# Patient Record
Sex: MALE | Race: WHITE | Employment: OTHER | URBAN - METROPOLITAN AREA
[De-identification: names, ages, dates, MRNs, and addresses within clinical notes are randomized per-mention and may not be internally consistent; named-entity substitution may affect disease eponyms.]

---

## 2017-01-02 LAB — INR BLD: 2.4

## 2017-01-03 ENCOUNTER — ANTI-COAG VISIT (OUTPATIENT)
Dept: CARDIOLOGY CLINIC | Age: 72
End: 2017-01-03

## 2017-01-05 ENCOUNTER — OFFICE VISIT (OUTPATIENT)
Dept: SURGERY | Age: 72
End: 2017-01-05

## 2017-01-05 VITALS — WEIGHT: 205 LBS | DIASTOLIC BLOOD PRESSURE: 72 MMHG | SYSTOLIC BLOOD PRESSURE: 100 MMHG | BODY MASS INDEX: 31.63 KG/M2

## 2017-01-05 DIAGNOSIS — L72.9 CYST OF BUTTOCKS: ICD-10-CM

## 2017-01-05 DIAGNOSIS — K61.2 ABSCESS OF ANAL AND RECTAL REGIONS: Primary | ICD-10-CM

## 2017-01-05 PROCEDURE — 99213 OFFICE O/P EST LOW 20 MIN: CPT | Performed by: SURGERY

## 2017-01-05 RX ORDER — LEVOFLOXACIN 500 MG/1
500 TABLET, FILM COATED ORAL DAILY
Qty: 10 TABLET | Refills: 0 | Status: SHIPPED | OUTPATIENT
Start: 2017-01-05 | End: 2017-01-15

## 2017-01-09 ENCOUNTER — ANTI-COAG VISIT (OUTPATIENT)
Dept: CARDIOLOGY CLINIC | Age: 72
End: 2017-01-09

## 2017-01-09 LAB — INR BLD: 2.1

## 2017-01-16 ENCOUNTER — ANTI-COAG VISIT (OUTPATIENT)
Dept: CARDIOLOGY CLINIC | Age: 72
End: 2017-01-16

## 2017-01-16 LAB — INR BLD: 1.9

## 2017-01-23 ENCOUNTER — ANTI-COAG VISIT (OUTPATIENT)
Dept: CARDIOLOGY CLINIC | Age: 72
End: 2017-01-23

## 2017-01-23 LAB — INR BLD: 2.1

## 2017-01-30 ENCOUNTER — HOSPITAL ENCOUNTER (OUTPATIENT)
Dept: OTHER | Age: 72
Discharge: HOME | End: 2017-01-30
Attending: NURSE PRACTITIONER | Admitting: NURSE PRACTITIONER

## 2017-01-30 ENCOUNTER — OFFICE VISIT (OUTPATIENT)
Dept: CARDIOLOGY CLINIC | Age: 72
End: 2017-01-30

## 2017-01-30 ENCOUNTER — TELEPHONE (OUTPATIENT)
Dept: CARDIOLOGY CLINIC | Age: 72
End: 2017-01-30

## 2017-01-30 VITALS
HEART RATE: 68 BPM | SYSTOLIC BLOOD PRESSURE: 122 MMHG | BODY MASS INDEX: 30.07 KG/M2 | HEIGHT: 69 IN | DIASTOLIC BLOOD PRESSURE: 74 MMHG | WEIGHT: 203 LBS

## 2017-01-30 DIAGNOSIS — I48.0 PAROXYSMAL ATRIAL FIBRILLATION (HCC): Primary | ICD-10-CM

## 2017-01-30 DIAGNOSIS — I45.10 RBBB (RIGHT BUNDLE BRANCH BLOCK): ICD-10-CM

## 2017-01-30 DIAGNOSIS — I10 ESSENTIAL HYPERTENSION: ICD-10-CM

## 2017-01-30 LAB
INR BLD: 3.03 (ref 0.85–1.16)
PROTHROMBIN TIME: 35.3 SEC (ref 9.8–13)

## 2017-01-30 PROCEDURE — G8484 FLU IMMUNIZE NO ADMIN: HCPCS | Performed by: NURSE PRACTITIONER

## 2017-01-30 PROCEDURE — 4040F PNEUMOC VAC/ADMIN/RCVD: CPT | Performed by: NURSE PRACTITIONER

## 2017-01-30 PROCEDURE — 93000 ELECTROCARDIOGRAM COMPLETE: CPT | Performed by: NURSE PRACTITIONER

## 2017-01-30 PROCEDURE — 3017F COLORECTAL CA SCREEN DOC REV: CPT | Performed by: NURSE PRACTITIONER

## 2017-01-30 PROCEDURE — 1123F ACP DISCUSS/DSCN MKR DOCD: CPT | Performed by: NURSE PRACTITIONER

## 2017-01-30 PROCEDURE — G8427 DOCREV CUR MEDS BY ELIG CLIN: HCPCS | Performed by: NURSE PRACTITIONER

## 2017-01-30 PROCEDURE — 1036F TOBACCO NON-USER: CPT | Performed by: NURSE PRACTITIONER

## 2017-01-30 PROCEDURE — 99213 OFFICE O/P EST LOW 20 MIN: CPT | Performed by: NURSE PRACTITIONER

## 2017-01-30 PROCEDURE — G8420 CALC BMI NORM PARAMETERS: HCPCS | Performed by: NURSE PRACTITIONER

## 2017-01-31 ENCOUNTER — TELEPHONE (OUTPATIENT)
Dept: CARDIOLOGY CLINIC | Age: 72
End: 2017-01-31

## 2017-02-07 ENCOUNTER — ANTI-COAG VISIT (OUTPATIENT)
Dept: CARDIOLOGY CLINIC | Age: 72
End: 2017-02-07

## 2017-02-07 DIAGNOSIS — I48.0 PAROXYSMAL ATRIAL FIBRILLATION (HCC): ICD-10-CM

## 2017-02-07 LAB — INR BLD: 2.7

## 2017-02-09 ENCOUNTER — OFFICE VISIT (OUTPATIENT)
Dept: SURGERY | Age: 72
End: 2017-02-09

## 2017-02-09 ENCOUNTER — TELEPHONE (OUTPATIENT)
Dept: BARIATRICS/WEIGHT MGMT | Age: 72
End: 2017-02-09

## 2017-02-09 VITALS — WEIGHT: 201 LBS | SYSTOLIC BLOOD PRESSURE: 120 MMHG | BODY MASS INDEX: 29.68 KG/M2 | DIASTOLIC BLOOD PRESSURE: 60 MMHG

## 2017-02-09 DIAGNOSIS — L72.9 CYST OF BUTTOCKS: Primary | ICD-10-CM

## 2017-02-09 PROCEDURE — 4040F PNEUMOC VAC/ADMIN/RCVD: CPT | Performed by: SURGERY

## 2017-02-09 PROCEDURE — 3017F COLORECTAL CA SCREEN DOC REV: CPT | Performed by: SURGERY

## 2017-02-09 PROCEDURE — 1123F ACP DISCUSS/DSCN MKR DOCD: CPT | Performed by: SURGERY

## 2017-02-09 PROCEDURE — G8420 CALC BMI NORM PARAMETERS: HCPCS | Performed by: SURGERY

## 2017-02-09 PROCEDURE — G8484 FLU IMMUNIZE NO ADMIN: HCPCS | Performed by: SURGERY

## 2017-02-09 PROCEDURE — G8427 DOCREV CUR MEDS BY ELIG CLIN: HCPCS | Performed by: SURGERY

## 2017-02-09 PROCEDURE — 1036F TOBACCO NON-USER: CPT | Performed by: SURGERY

## 2017-02-09 PROCEDURE — 99213 OFFICE O/P EST LOW 20 MIN: CPT | Performed by: SURGERY

## 2017-02-09 RX ORDER — CIPROFLOXACIN 500 MG/1
500 TABLET, FILM COATED ORAL 2 TIMES DAILY
Qty: 10 TABLET | Refills: 0 | Status: SHIPPED | OUTPATIENT
Start: 2017-02-09 | End: 2017-02-14

## 2017-02-13 ENCOUNTER — ANTI-COAG VISIT (OUTPATIENT)
Dept: CARDIOLOGY CLINIC | Age: 72
End: 2017-02-13

## 2017-02-13 ENCOUNTER — HOSPITAL ENCOUNTER (OUTPATIENT)
Dept: NON INVASIVE DIAGNOSTICS | Age: 72
Discharge: HOME | End: 2017-02-13
Attending: NURSE PRACTITIONER | Admitting: NURSE PRACTITIONER

## 2017-02-13 DIAGNOSIS — I48.0 PAROXYSMAL ATRIAL FIBRILLATION (HCC): ICD-10-CM

## 2017-02-13 LAB
INR BLD: 1.7
LV EF: 73 %
LVEF MODALITY: NORMAL

## 2017-02-15 ENCOUNTER — TELEPHONE (OUTPATIENT)
Dept: BARIATRICS/WEIGHT MGMT | Age: 72
End: 2017-02-15

## 2017-02-27 ENCOUNTER — ANTI-COAG VISIT (OUTPATIENT)
Dept: CARDIOLOGY CLINIC | Age: 72
End: 2017-02-27

## 2017-02-27 LAB — INR BLD: 2

## 2017-03-06 ENCOUNTER — ANTI-COAG VISIT (OUTPATIENT)
Dept: CARDIOLOGY CLINIC | Age: 72
End: 2017-03-06

## 2017-03-06 DIAGNOSIS — I48.0 PAROXYSMAL ATRIAL FIBRILLATION (HCC): ICD-10-CM

## 2017-03-06 LAB — INR BLD: 2.1

## 2017-03-13 ENCOUNTER — ANTI-COAG VISIT (OUTPATIENT)
Dept: CARDIOLOGY CLINIC | Age: 72
End: 2017-03-13

## 2017-03-13 LAB — INR BLD: 2.9

## 2017-03-20 ENCOUNTER — ANTI-COAG VISIT (OUTPATIENT)
Dept: CARDIOLOGY CLINIC | Age: 72
End: 2017-03-20

## 2017-03-20 LAB — INR BLD: 2.7

## 2017-03-27 ENCOUNTER — ANTI-COAG VISIT (OUTPATIENT)
Dept: CARDIOLOGY CLINIC | Age: 72
End: 2017-03-27

## 2017-03-27 LAB — INR BLD: 2.5

## 2017-04-03 ENCOUNTER — ANTI-COAG VISIT (OUTPATIENT)
Dept: CARDIOLOGY CLINIC | Age: 72
End: 2017-04-03

## 2017-04-03 LAB — INR BLD: 2

## 2017-04-06 ENCOUNTER — OFFICE VISIT (OUTPATIENT)
Dept: BARIATRICS/WEIGHT MGMT | Age: 72
End: 2017-04-06

## 2017-04-06 VITALS
BODY MASS INDEX: 31.22 KG/M2 | HEART RATE: 61 BPM | DIASTOLIC BLOOD PRESSURE: 81 MMHG | HEIGHT: 68 IN | RESPIRATION RATE: 16 BRPM | WEIGHT: 206 LBS | SYSTOLIC BLOOD PRESSURE: 125 MMHG

## 2017-04-06 DIAGNOSIS — Z98.84 LAP-BAND SURGERY STATUS: ICD-10-CM

## 2017-04-06 DIAGNOSIS — Z98.84 S/P GASTRIC BYPASS: ICD-10-CM

## 2017-04-06 DIAGNOSIS — E66.01 SEVERE OBESITY (BMI 35.0-39.9) WITH COMORBIDITY (HCC): Primary | ICD-10-CM

## 2017-04-06 PROCEDURE — 99212 OFFICE O/P EST SF 10 MIN: CPT | Performed by: SURGERY

## 2017-04-06 PROCEDURE — 4040F PNEUMOC VAC/ADMIN/RCVD: CPT | Performed by: SURGERY

## 2017-04-06 PROCEDURE — 3017F COLORECTAL CA SCREEN DOC REV: CPT | Performed by: SURGERY

## 2017-04-06 PROCEDURE — 1123F ACP DISCUSS/DSCN MKR DOCD: CPT | Performed by: SURGERY

## 2017-04-06 PROCEDURE — 1036F TOBACCO NON-USER: CPT | Performed by: SURGERY

## 2017-04-06 PROCEDURE — G8417 CALC BMI ABV UP PARAM F/U: HCPCS | Performed by: SURGERY

## 2017-04-06 PROCEDURE — G8427 DOCREV CUR MEDS BY ELIG CLIN: HCPCS | Performed by: SURGERY

## 2017-04-06 RX ORDER — DIVALPROEX SODIUM 500 MG/1
500 TABLET, DELAYED RELEASE ORAL 3 TIMES DAILY
COMMUNITY
End: 2018-11-01

## 2017-04-10 ENCOUNTER — ANTI-COAG VISIT (OUTPATIENT)
Dept: CARDIOLOGY CLINIC | Age: 72
End: 2017-04-10

## 2017-04-10 LAB — INR BLD: 1.8

## 2017-04-12 RX ORDER — CIPROFLOXACIN 500 MG/1
500 TABLET, FILM COATED ORAL 2 TIMES DAILY
Qty: 20 TABLET | Refills: 1 | Status: SHIPPED | OUTPATIENT
Start: 2017-04-12 | End: 2017-04-12 | Stop reason: SDUPTHER

## 2017-04-12 RX ORDER — CIPROFLOXACIN 500 MG/1
500 TABLET, FILM COATED ORAL 2 TIMES DAILY
Qty: 20 TABLET | Refills: 0 | Status: SHIPPED | OUTPATIENT
Start: 2017-04-12 | End: 2017-04-22

## 2017-04-17 LAB — INR BLD: 2.8

## 2017-04-18 ENCOUNTER — ANTI-COAG VISIT (OUTPATIENT)
Dept: CARDIOLOGY CLINIC | Age: 72
End: 2017-04-18

## 2017-04-24 LAB — INR BLD: 2.1

## 2017-04-27 ENCOUNTER — ANTI-COAG VISIT (OUTPATIENT)
Dept: CARDIOLOGY CLINIC | Age: 72
End: 2017-04-27

## 2017-05-01 ENCOUNTER — ANTI-COAG VISIT (OUTPATIENT)
Dept: CARDIOLOGY CLINIC | Age: 72
End: 2017-05-01

## 2017-05-01 LAB — INR BLD: 2.2

## 2017-05-04 ENCOUNTER — OFFICE VISIT (OUTPATIENT)
Dept: CARDIOLOGY CLINIC | Age: 72
End: 2017-05-04

## 2017-05-04 VITALS
WEIGHT: 214.5 LBS | DIASTOLIC BLOOD PRESSURE: 62 MMHG | HEART RATE: 62 BPM | SYSTOLIC BLOOD PRESSURE: 100 MMHG | BODY MASS INDEX: 33.1 KG/M2

## 2017-05-04 DIAGNOSIS — I48.0 PAROXYSMAL ATRIAL FIBRILLATION (HCC): Primary | ICD-10-CM

## 2017-05-04 DIAGNOSIS — I10 ESSENTIAL HYPERTENSION: ICD-10-CM

## 2017-05-04 DIAGNOSIS — G47.33 OBSTRUCTIVE SLEEP APNEA: ICD-10-CM

## 2017-05-04 PROCEDURE — 3017F COLORECTAL CA SCREEN DOC REV: CPT | Performed by: NURSE PRACTITIONER

## 2017-05-04 PROCEDURE — 1123F ACP DISCUSS/DSCN MKR DOCD: CPT | Performed by: NURSE PRACTITIONER

## 2017-05-04 PROCEDURE — 93000 ELECTROCARDIOGRAM COMPLETE: CPT | Performed by: NURSE PRACTITIONER

## 2017-05-04 PROCEDURE — 99213 OFFICE O/P EST LOW 20 MIN: CPT | Performed by: NURSE PRACTITIONER

## 2017-05-04 PROCEDURE — G8427 DOCREV CUR MEDS BY ELIG CLIN: HCPCS | Performed by: NURSE PRACTITIONER

## 2017-05-04 PROCEDURE — 1036F TOBACCO NON-USER: CPT | Performed by: NURSE PRACTITIONER

## 2017-05-04 PROCEDURE — G8417 CALC BMI ABV UP PARAM F/U: HCPCS | Performed by: NURSE PRACTITIONER

## 2017-05-04 PROCEDURE — 4040F PNEUMOC VAC/ADMIN/RCVD: CPT | Performed by: NURSE PRACTITIONER

## 2017-05-08 ENCOUNTER — ANTI-COAG VISIT (OUTPATIENT)
Dept: CARDIOLOGY CLINIC | Age: 72
End: 2017-05-08

## 2017-05-08 LAB — INR BLD: 1.8

## 2017-05-15 ENCOUNTER — ANTI-COAG VISIT (OUTPATIENT)
Dept: CARDIOLOGY CLINIC | Age: 72
End: 2017-05-15

## 2017-05-15 LAB — INR BLD: 2

## 2017-05-17 ENCOUNTER — TELEPHONE (OUTPATIENT)
Dept: DERMATOLOGY | Age: 72
End: 2017-05-17

## 2017-05-18 ENCOUNTER — OFFICE VISIT (OUTPATIENT)
Dept: DERMATOLOGY | Age: 72
End: 2017-05-18

## 2017-05-18 DIAGNOSIS — L82.1 SK (SEBORRHEIC KERATOSIS): Primary | ICD-10-CM

## 2017-05-18 DIAGNOSIS — L57.0 AK (ACTINIC KERATOSIS): ICD-10-CM

## 2017-05-18 PROCEDURE — 1036F TOBACCO NON-USER: CPT | Performed by: DERMATOLOGY

## 2017-05-18 PROCEDURE — 99213 OFFICE O/P EST LOW 20 MIN: CPT | Performed by: DERMATOLOGY

## 2017-05-18 PROCEDURE — G8427 DOCREV CUR MEDS BY ELIG CLIN: HCPCS | Performed by: DERMATOLOGY

## 2017-05-18 PROCEDURE — 1123F ACP DISCUSS/DSCN MKR DOCD: CPT | Performed by: DERMATOLOGY

## 2017-05-18 PROCEDURE — 3017F COLORECTAL CA SCREEN DOC REV: CPT | Performed by: DERMATOLOGY

## 2017-05-18 PROCEDURE — 4040F PNEUMOC VAC/ADMIN/RCVD: CPT | Performed by: DERMATOLOGY

## 2017-05-18 PROCEDURE — G8417 CALC BMI ABV UP PARAM F/U: HCPCS | Performed by: DERMATOLOGY

## 2017-05-18 RX ORDER — LANOLIN ALCOHOL/MO/W.PET/CERES
1000 CREAM (GRAM) TOPICAL DAILY
COMMUNITY
End: 2020-09-23 | Stop reason: ALTCHOICE

## 2017-05-22 ENCOUNTER — ANTI-COAG VISIT (OUTPATIENT)
Dept: CARDIOLOGY CLINIC | Age: 72
End: 2017-05-22

## 2017-05-22 LAB — INR BLD: 2.4

## 2017-05-29 LAB — INR BLD: 2.9

## 2017-05-30 ENCOUNTER — ANTI-COAG VISIT (OUTPATIENT)
Dept: CARDIOLOGY CLINIC | Age: 72
End: 2017-05-30

## 2017-06-05 ENCOUNTER — ANTI-COAG VISIT (OUTPATIENT)
Dept: CARDIOLOGY CLINIC | Age: 72
End: 2017-06-05

## 2017-06-05 LAB — INR BLD: 2.2

## 2017-06-09 RX ORDER — WARFARIN SODIUM 5 MG/1
TABLET ORAL
Qty: 135 TABLET | Refills: 1 | Status: SHIPPED | OUTPATIENT
Start: 2017-06-09 | End: 2020-02-03 | Stop reason: ALTCHOICE

## 2017-06-12 ENCOUNTER — ANTI-COAG VISIT (OUTPATIENT)
Dept: CARDIOLOGY CLINIC | Age: 72
End: 2017-06-12

## 2017-06-12 LAB — INR BLD: 2.6

## 2017-06-15 ENCOUNTER — TELEPHONE (OUTPATIENT)
Dept: CARDIOLOGY CLINIC | Age: 72
End: 2017-06-15

## 2017-06-19 ENCOUNTER — ANTI-COAG VISIT (OUTPATIENT)
Dept: CARDIOLOGY CLINIC | Age: 72
End: 2017-06-19

## 2017-06-19 LAB — INR BLD: 2.4

## 2017-06-26 ENCOUNTER — ANTI-COAG VISIT (OUTPATIENT)
Dept: CARDIOLOGY CLINIC | Age: 72
End: 2017-06-26

## 2017-06-26 LAB — INR BLD: 4.1

## 2017-07-03 ENCOUNTER — ANTI-COAG VISIT (OUTPATIENT)
Dept: CARDIOLOGY CLINIC | Age: 72
End: 2017-07-03

## 2017-07-03 LAB — INR BLD: 2.4

## 2017-07-10 ENCOUNTER — ANTI-COAG VISIT (OUTPATIENT)
Dept: CARDIOLOGY CLINIC | Age: 72
End: 2017-07-10

## 2017-07-10 LAB — INR BLD: 2

## 2017-07-17 ENCOUNTER — ANTI-COAG VISIT (OUTPATIENT)
Dept: CARDIOLOGY CLINIC | Age: 72
End: 2017-07-17

## 2017-07-17 LAB — INR BLD: 3.9

## 2017-07-20 ENCOUNTER — TELEPHONE (OUTPATIENT)
Dept: PHARMACY | Age: 72
End: 2017-07-20

## 2017-07-21 ENCOUNTER — TELEPHONE (OUTPATIENT)
Dept: PHARMACY | Age: 72
End: 2017-07-21

## 2017-07-24 ENCOUNTER — TELEPHONE (OUTPATIENT)
Dept: CARDIOLOGY CLINIC | Age: 72
End: 2017-07-24

## 2017-07-24 ENCOUNTER — ANTI-COAG VISIT (OUTPATIENT)
Dept: CARDIOLOGY CLINIC | Age: 72
End: 2017-07-24

## 2017-07-24 DIAGNOSIS — I48.0 PAROXYSMAL ATRIAL FIBRILLATION (HCC): ICD-10-CM

## 2017-07-24 LAB
INR BLD: 1.4
INR BLD: 1.4

## 2017-07-28 ENCOUNTER — ANTI-COAG VISIT (OUTPATIENT)
Dept: CARDIOLOGY CLINIC | Age: 72
End: 2017-07-28

## 2017-07-28 LAB — INR BLD: 2

## 2017-08-10 ENCOUNTER — OFFICE VISIT (OUTPATIENT)
Dept: CARDIOLOGY CLINIC | Age: 72
End: 2017-08-10

## 2017-08-10 ENCOUNTER — ANTI-COAG VISIT (OUTPATIENT)
Dept: PHARMACY | Age: 72
End: 2017-08-10

## 2017-08-10 VITALS
DIASTOLIC BLOOD PRESSURE: 62 MMHG | BODY MASS INDEX: 32.75 KG/M2 | SYSTOLIC BLOOD PRESSURE: 102 MMHG | WEIGHT: 212.25 LBS | HEART RATE: 70 BPM

## 2017-08-10 DIAGNOSIS — I48.0 PAROXYSMAL ATRIAL FIBRILLATION (HCC): Primary | ICD-10-CM

## 2017-08-10 DIAGNOSIS — I10 ESSENTIAL HYPERTENSION: ICD-10-CM

## 2017-08-10 DIAGNOSIS — I48.0 PAROXYSMAL ATRIAL FIBRILLATION (HCC): ICD-10-CM

## 2017-08-10 DIAGNOSIS — G47.33 OBSTRUCTIVE SLEEP APNEA: ICD-10-CM

## 2017-08-10 DIAGNOSIS — I45.10 RBBB (RIGHT BUNDLE BRANCH BLOCK): ICD-10-CM

## 2017-08-10 LAB
INR BLD: 3
PROTIME: 36.3 SECONDS

## 2017-08-10 PROCEDURE — 99213 OFFICE O/P EST LOW 20 MIN: CPT | Performed by: NURSE PRACTITIONER

## 2017-08-10 PROCEDURE — 4040F PNEUMOC VAC/ADMIN/RCVD: CPT | Performed by: NURSE PRACTITIONER

## 2017-08-10 PROCEDURE — 3017F COLORECTAL CA SCREEN DOC REV: CPT | Performed by: NURSE PRACTITIONER

## 2017-08-10 PROCEDURE — 93000 ELECTROCARDIOGRAM COMPLETE: CPT | Performed by: NURSE PRACTITIONER

## 2017-08-10 PROCEDURE — 1123F ACP DISCUSS/DSCN MKR DOCD: CPT | Performed by: NURSE PRACTITIONER

## 2017-08-10 PROCEDURE — 1036F TOBACCO NON-USER: CPT | Performed by: NURSE PRACTITIONER

## 2017-08-10 PROCEDURE — G8417 CALC BMI ABV UP PARAM F/U: HCPCS | Performed by: NURSE PRACTITIONER

## 2017-08-10 PROCEDURE — G8427 DOCREV CUR MEDS BY ELIG CLIN: HCPCS | Performed by: NURSE PRACTITIONER

## 2017-08-10 RX ORDER — PRIMIDONE 50 MG/1
50 TABLET ORAL NIGHTLY
COMMUNITY
End: 2018-07-20 | Stop reason: DRUGHIGH

## 2017-08-23 ENCOUNTER — ANTI-COAG VISIT (OUTPATIENT)
Dept: PHARMACY | Age: 72
End: 2017-08-23

## 2017-08-23 DIAGNOSIS — I48.0 PAROXYSMAL ATRIAL FIBRILLATION (HCC): ICD-10-CM

## 2017-08-23 LAB
INR BLD: 2.8
PROTIME: 33.8 SECONDS

## 2017-09-05 ENCOUNTER — TELEPHONE (OUTPATIENT)
Dept: PHARMACY | Age: 72
End: 2017-09-05

## 2017-09-13 ENCOUNTER — ANTI-COAG VISIT (OUTPATIENT)
Dept: PHARMACY | Age: 72
End: 2017-09-13

## 2017-09-13 LAB
INR BLD: 1.4
PROTIME: 16.5 SECONDS

## 2017-09-15 ENCOUNTER — TELEPHONE (OUTPATIENT)
Dept: PHARMACY | Age: 72
End: 2017-09-15

## 2017-09-27 ENCOUNTER — ANTI-COAG VISIT (OUTPATIENT)
Dept: PHARMACY | Age: 72
End: 2017-09-27

## 2017-09-27 LAB
INR BLD: 1.7
PROTIME: 20.5 SECONDS

## 2017-10-18 ENCOUNTER — ANTI-COAG VISIT (OUTPATIENT)
Dept: PHARMACY | Age: 72
End: 2017-10-18

## 2017-10-18 DIAGNOSIS — I48.91 ATRIAL FIBRILLATION, UNSPECIFIED TYPE (HCC): ICD-10-CM

## 2017-10-18 LAB
INR BLD: 1.7
PROTIME: 20.1 SECONDS

## 2017-10-18 NOTE — PROGRESS NOTES
Mr. Elizabeth Olvera is a 67 y.o. y/o male with history of Afib   He presents today for anticoagulation monitoring and adjustment. Pertinent PMH: COPD, CHF, HTN  Patient was previously a patient here and then monitored through his cardiologist. Returned back to the clinic on 8/10/17. Patient Reported Findings:  Yes     No  [x]   []       Patient verifies current dosing regimen as listed  []   [x]       S/S bleeding/bruising/swelling/SOB  []   [x]       Blood in urine or stool  []   [x]       Procedures scheduled in the future at this time  []   [x]       Missed Dose  []   [x]       Extra Dose  [x]   []       Change in medications -finishing cephalexin as of today  [x]   []       Change in health/diet/appetite Patient will have his lap band tightened tomorrow which will continue to affect his diet. Patient still conscious of what he is eating because he is supposed to be dieting. Patient doesn't eat many vegetables. States he \"won't touch broccoli\". He has stated in the past that he wants to start a vegan diet in the near future (he wants to do research first) and will let us know when that starts so we can adjust the dose.    []   [x]       Change in alcohol use  []   [x]       Change in activity  []   [x]       Hospital admission  []   [x]       Emergency department visit  []   [x]       Other complaints  []   [x]       Tobacco use    Clinical Outcomes:  Yes     No  []   [x]       Major bleeding event  []   [x]       Thromboembolic event    Duration of warfarin Therapy: indefinitely  INR Range:  2.0-3.0  INR 1.7 again today   Increase weekly dose to 7.5 mg on Tue, Thu and Sat and 5 mg all other days. (6.3% increase)  Recheck INR 3 weeks.      Referring cardiologist: Dr. Ezequiel Rasheed  INR (no units)   Date Value   10/18/2017 1.7   09/27/2017 1.7   09/13/2017 1.4   08/23/2017 2.8

## 2017-10-25 ENCOUNTER — TELEPHONE (OUTPATIENT)
Dept: PHARMACY | Age: 72
End: 2017-10-25

## 2017-10-25 NOTE — TELEPHONE ENCOUNTER
----- Message from Sadia Galaviz sent at 10/25/2017  3:32 PM EDT -----  Contact: Don Rizzo   Pt called to report he was prescribed Bromfed DM 2-30-10 mg/5 ml syrup , take 2 tsp every 6 hrs, PRN ( for cough) and Keflex 500 mg cap, 2 caps ( 1000 mg's) BID for 7 days. Pt stated MD who prescribed is aware he is taking warfarin. No need to call pt if this does not affect INR. [phone].

## 2017-10-25 NOTE — TELEPHONE ENCOUNTER
Warfarin may have a reaction with Kelfex and may increase the INR slightly. Patient was low today and can continue current warfarin regimen, we will see him in 3 weeks.

## 2017-10-27 ENCOUNTER — TELEPHONE (OUTPATIENT)
Dept: CARDIOLOGY CLINIC | Age: 72
End: 2017-10-27

## 2017-10-27 NOTE — TELEPHONE ENCOUNTER
Patient was put on antibiotic by his PCP for a cold . He knows the antibiotic will change his INR. Should he change his dose of coumadin ?  Please call today so he knows for the weekend

## 2017-10-30 ENCOUNTER — TELEPHONE (OUTPATIENT)
Dept: PHARMACY | Age: 72
End: 2017-10-30

## 2017-10-30 ENCOUNTER — HOSPITAL ENCOUNTER (OUTPATIENT)
Dept: OTHER | Age: 72
Discharge: HOME | End: 2017-10-30
Attending: FAMILY MEDICINE | Admitting: FAMILY MEDICINE

## 2017-10-30 DIAGNOSIS — J20.8 ACUTE BRONCHITIS DUE TO OTHER SPECIFIED ORGANISMS: ICD-10-CM

## 2017-10-30 NOTE — TELEPHONE ENCOUNTER
----- Message from Jeanette Catherine sent at 10/30/2017  8:35 AM EDT -----  Contact: Gustabo Ferreira ( stopped by  )  Pt stopped by  ( heading for x ray) . He was prescribed medication after we left on Friday. Prednisone 10 mg tapered dose and Doxycycline 100 mg caps , 1 cap po, BID for 10 days ( make sure to clarify that ) . Pt stated he cut back his warfarin to 2.5 mg's on F/Sa and Sunday , because that is what he had to do last time. You can leave message if he is unable to get to the phone d/t appt at hospital today. [phone]. Thank you.

## 2017-11-01 ENCOUNTER — HOSPITAL ENCOUNTER (OUTPATIENT)
Dept: OTHER | Age: 72
Discharge: HOME | End: 2017-11-30
Attending: INTERNAL MEDICINE | Admitting: INTERNAL MEDICINE

## 2017-11-08 ENCOUNTER — ANTI-COAG VISIT (OUTPATIENT)
Dept: PHARMACY | Age: 72
End: 2017-11-08

## 2017-11-08 LAB
INR BLD: 1.5
PROTIME: 18.3 SECONDS

## 2017-11-15 ENCOUNTER — ANTI-COAG VISIT (OUTPATIENT)
Dept: PHARMACY | Age: 72
End: 2017-11-15

## 2017-11-15 DIAGNOSIS — I48.91 ATRIAL FIBRILLATION, UNSPECIFIED TYPE (HCC): ICD-10-CM

## 2017-11-15 LAB
INR BLD: 3.3
PROTIME: 39.6 SECONDS

## 2017-11-15 NOTE — PROGRESS NOTES
Mr. Jonn Clark is a 67 y.o. y/o male with history of Afib   He presents today for anticoagulation monitoring and adjustment. Pertinent PMH: COPD, CHF, HTN  Patient was previously a patient here and then monitored through his cardiologist. Returned back to the clinic on 8/10/17. Patient Reported Findings:  Yes     No  [x]   []       Patient verifies current dosing regimen as listed  []   [x]       S/S bleeding/bruising/swelling/SOB  []   [x]       Blood in urine or stool  []   [x]       Procedures scheduled in the future at this time  []   [x]       Missed Dose  []   [x]       Extra Dose  []   [x]       Change in medications   [x]   []       Change in health/diet/appetite Patient will have his lap band tightened on Mon 11/27 after Thanksgiving which will continue to affect his diet. Patient is conscious of what he is eating because he is supposed to be dieting and emphasizing protein. Patient doesn't eat many vegetables. States he \"won't touch broccoli\". He has stated in the past that he wants to start a vegan diet in the near future (he wants to do research first) and will let us know if/when that starts so we can adjust the dose.    []   [x]       Change in alcohol use  []   [x]       Change in activity  []   [x]       Hospital admission  []   [x]       Emergency department visit  []   [x]       Other complaints  []   [x]       Tobacco use    Clinical Outcomes:  Yes     No  []   [x]       Major bleeding event  []   [x]       Thromboembolic event    Duration of warfarin Therapy: indefinitely  INR Range:  2.0-3.0     INR 3.3 today  Decrease weekly dose to 7.5mg on Tues, Thur & Sat and 5mg all other days. Recheck INR 2 weeks.       Referring cardiologist: Dr. Vaibhav Allison  INR (no units)   Date Value   11/15/2017 3.3   11/08/2017 1.5   10/18/2017 1.7   09/27/2017 1.7

## 2017-11-29 ENCOUNTER — ANTI-COAG VISIT (OUTPATIENT)
Dept: PHARMACY | Age: 72
End: 2017-11-29

## 2017-11-29 DIAGNOSIS — I48.91 ATRIAL FIBRILLATION, UNSPECIFIED TYPE (HCC): ICD-10-CM

## 2017-11-29 LAB
INR BLD: 1.7
PROTIME: 20.1 SECONDS

## 2017-12-01 ENCOUNTER — HOSPITAL ENCOUNTER (OUTPATIENT)
Dept: OTHER | Age: 72
Discharge: HOME | End: 2017-12-31
Attending: INTERNAL MEDICINE | Admitting: INTERNAL MEDICINE

## 2017-12-13 ENCOUNTER — ANTI-COAG VISIT (OUTPATIENT)
Dept: PHARMACY | Age: 72
End: 2017-12-13

## 2017-12-13 DIAGNOSIS — I48.91 ATRIAL FIBRILLATION, UNSPECIFIED TYPE (HCC): ICD-10-CM

## 2017-12-13 LAB
INR BLD: 1.2
PROTIME: 14.3 SECONDS

## 2017-12-13 NOTE — PROGRESS NOTES
Mr. Judith Guzman is a 67 y.o. y/o male with history of Afib   He presents today for anticoagulation monitoring and adjustment. Pertinent PMH: COPD, CHF, HTN  Patient was previously a patient here and then monitored through his cardiologist. Returned back to the clinic on 8/10/17. Patient Reported Findings:  Yes     No  [x]   []       Patient verifies current dosing regimen as listed  []   [x]       S/S bleeding/bruising/swelling/SOB  []   [x]       Blood in urine or stool  []   [x]       Procedures scheduled in the future at this time Patient had his lap band tightened which will continue to affect his diet. []   [x]       Missed Dose  []   [x]       Extra Dose  [x]   []       Change in medications states that he has been on an abx, but has no idea what the name is. States that he only has one more day. Advised patient to please notify clinic next time on an abx. [x]   []       Change in health/diet/appetite Patient had a small side salad last night which was very unusual.   Patient is conscious of what he is eating because he is supposed to be dieting and emphasizing protein which will be more fish and chicken. Patient doesn't eat many vegetables. States he \"won't touch broccoli\". He has stated in the past that he wants to start a vegan diet but is not considering any more. States that he has had vegetables almost every day for the past week. He is unsure if this will continue or not. []   [x]       Change in alcohol use  []   [x]       Change in activity  []   [x]       Hospital admission  []   [x]       Emergency department visit  []   [x]       Other complaints  []   [x]       Tobacco use    Clinical Outcomes:  Yes     No  []   [x]       Major bleeding event  []   [x]       Thromboembolic event    Duration of warfarin Therapy: indefinitely  INR Range:  2.0-3.0     INR 1.2 today  Denies any missed doses.  Since unsure if vegetables will remain consistent, will boost patient and increase weekly dose slightly as last visit made large dose increase. Will need to assess patient's vegetable intake at next appt. Take 10 mg tonight and tomorrow, take 7.5 mg on Fri then increase weekly dose back to  5mg on Mon & Fri and 7.5 mg all other days  (5.6 % increase)  Will not be able to see effects of weekly dose increase at next visit d/t boosting. Recheck INR 1 week, 12/20.     Referring cardiologist: Dr. Blade Dunham  INR (no units)   Date Value   12/13/2017 1.2   11/29/2017 1.7   11/15/2017 3.3   11/08/2017 1.5

## 2017-12-20 ENCOUNTER — ANTI-COAG VISIT (OUTPATIENT)
Dept: PHARMACY | Age: 72
End: 2017-12-20

## 2017-12-20 LAB
INR BLD: 1.8
PROTIME: 21.5 SECONDS

## 2017-12-20 NOTE — PROGRESS NOTES
Mr. Norbert Hardy is a 67 y.o. y/o male with history of Afib   He presents today for anticoagulation monitoring and adjustment. Pertinent PMH: COPD, CHF, HTN  Patient was previously a patient here and then monitored through his cardiologist. Returned back to the clinic on 8/10/17. Patient Reported Findings:  Yes     No  [x]   []       Patient verifies current dosing regimen as listed  []   [x]       S/S bleeding/bruising/swelling/SOB  []   [x]       Blood in urine or stool  []   [x]       Procedures scheduled in the future at this time Patient had his lap band tightened which will continue to affect his diet. []   [x]       Missed Dose  []   [x]       Extra Dose  []   [x]       Change in medications    [x]   []       Change in health/diet/appetite Patient had a small side salad last night which was very unusual.   Patient is conscious of what he is eating because he is supposed to be dieting and emphasizing protein which will be more fish and chicken. Patient doesn't eat many vegetables. States he \"won't touch broccoli\". He has stated in the past that he wants to start a vegan diet but is not considering any more. States that he has had vegetables almost every day for the past week. He is unsure if this will continue or not. []   [x]       Change in alcohol use  []   [x]       Change in activity  []   [x]       Hospital admission  []   [x]       Emergency department visit  []   [x]       Other complaints  []   [x]       Tobacco use    Clinical Outcomes:  Yes     No  []   [x]       Major bleeding event  []   [x]       Thromboembolic event    Duration of warfarin Therapy: indefinitely  INR Range:  2.0-3.0     INR 1.8 today  Has been eating more vegetables lately.    Take 10 mg tonight then continue weekly dose back of 5mg on Mon & Fri and 7.5 mg all other days   Was boosted at last visit, would not be able to see effects of weekly dose increase today dt boosting, will continue current dose and check back again in 2 weeks. Recheck INR in 2 weeks, Jan 3rd (reminded to bring ID and insurance).      Referring cardiologist: Dr. Lela Del Angel  INR (no units)   Date Value   12/20/2017 1.8   12/13/2017 1.2   11/29/2017 1.7   11/15/2017 3.3

## 2017-12-27 ENCOUNTER — TELEPHONE (OUTPATIENT)
Dept: PHARMACY | Age: 72
End: 2017-12-27

## 2017-12-27 NOTE — TELEPHONE ENCOUNTER
Returned patient call. Since patient's last INR was 1.8, do not need to adjust warfarin dose as much. Instructed for patient to take 5 mg on Sat then continue weekly dose. (5% reduction). Patient scheduled to return on 1/3, will assess INR then.     Medrol will be complete 1/1  Doxycycline will be completed 1/10

## 2017-12-27 NOTE — TELEPHONE ENCOUNTER
----- Message from Sharifa Jackman sent at 12/27/2017  4:11 PM EST -----  Contact: Patient   Patient starting 2 new medications today: Doxycycline 100mg x2 daily x 14 days and Medrol dose pack. 4mg x 6 days. Please call him back @ [phone].

## 2018-01-01 ENCOUNTER — HOSPITAL ENCOUNTER (OUTPATIENT)
Dept: OTHER | Age: 73
Discharge: HOME | End: 2018-01-31
Attending: INTERNAL MEDICINE | Admitting: INTERNAL MEDICINE

## 2018-01-03 ENCOUNTER — ANTI-COAG VISIT (OUTPATIENT)
Dept: PHARMACY | Age: 73
End: 2018-01-03

## 2018-01-03 DIAGNOSIS — I48.91 ATRIAL FIBRILLATION, UNSPECIFIED TYPE (HCC): Primary | ICD-10-CM

## 2018-01-03 LAB
INR BLD: 1.7
PROTIME: 19.9 SECONDS

## 2018-01-03 NOTE — PROGRESS NOTES
patient to remain consistent with vit k intake  Recheck INR in 2 weeks, 1/17    Referring cardiologist: Dr. Daysi Carver  INR (no units)   Date Value   01/03/2018 1.7   12/20/2017 1.8   12/13/2017 1.2   11/29/2017 1.7

## 2018-01-17 ENCOUNTER — ANTI-COAG VISIT (OUTPATIENT)
Dept: PHARMACY | Age: 73
End: 2018-01-17

## 2018-01-17 LAB
INR BLD: 2.2
PROTIME: 26.5 SECONDS

## 2018-01-31 ENCOUNTER — TELEPHONE (OUTPATIENT)
Dept: PHARMACY | Age: 73
End: 2018-01-31

## 2018-01-31 NOTE — TELEPHONE ENCOUNTER
Warfarin prescription phoned  into [id number] to Pharmacist Paul, [phone] under Dr. Carl Mccoy  Warfarin 5 mg tabs  Take 5mg on Mon only and 7.5mg all other days  90 days x 2 refills
none

## 2018-02-01 ENCOUNTER — HOSPITAL ENCOUNTER (OUTPATIENT)
Dept: OTHER | Age: 73
Discharge: HOME | End: 2018-02-28
Attending: INTERNAL MEDICINE | Admitting: INTERNAL MEDICINE

## 2018-02-05 ENCOUNTER — ANTI-COAG VISIT (OUTPATIENT)
Dept: PHARMACY | Age: 73
End: 2018-02-05

## 2018-02-05 LAB
INR BLD: 3.3
PROTIME: 39.5 SECONDS

## 2018-02-28 ENCOUNTER — ANTI-COAG VISIT (OUTPATIENT)
Dept: PHARMACY | Age: 73
End: 2018-02-28

## 2018-02-28 DIAGNOSIS — I48.91 ATRIAL FIBRILLATION, UNSPECIFIED TYPE (HCC): ICD-10-CM

## 2018-02-28 LAB
INR BLD: 3.8
PROTIME: 45.3 SECONDS

## 2018-03-01 ENCOUNTER — HOSPITAL ENCOUNTER (OUTPATIENT)
Dept: OTHER | Age: 73
Discharge: HOME | End: 2018-03-31
Attending: INTERNAL MEDICINE | Admitting: INTERNAL MEDICINE

## 2018-03-14 ENCOUNTER — ANTI-COAG VISIT (OUTPATIENT)
Dept: PHARMACY | Age: 73
End: 2018-03-14

## 2018-03-14 DIAGNOSIS — I48.91 ATRIAL FIBRILLATION, UNSPECIFIED TYPE (HCC): ICD-10-CM

## 2018-03-14 LAB
INR BLD: 3.8
PROTIME: 45.1 SECONDS

## 2018-03-14 NOTE — PROGRESS NOTES
Patient states that he takes 5 mg on Tues not Mon, adjusted weekly dose to reflect  Mr. Megan Waddell is a 67 y.o. y/o male with history of Afib   He presents today for anticoagulation monitoring and adjustment. Pertinent PMH: COPD, CHF, HTN  Patient was previously a patient here and then monitored through his cardiologist. Returned back to the clinic on 8/10/17. Patient Reported Findings:  Yes     No  [x]   []       Patient verifies current dosing regimen as listed     []   [x]       S/S bleeding/bruising/swelling/SOB  []   [x]       Blood in urine or stool  []   [x]       Procedures scheduled in the future at this time Patient had his lap band tightened which will continue to affect his diet. []   [x]       Missed Dose  []   [x]       Extra Dose  []   [x]       Change in medications   [x]   []       Change in health/diet/appetite Patient states he has increased his vegetables to 4 days a week where before it was 0-1 times a week. Does not explain why he continues to be supratherapeutic today. Has been eating baby food vegetables since band tightening which is only a small quantity. []   [x]       Change in alcohol use  []   [x]       Change in activity  []   [x]       Hospital admission  []   [x]       Emergency department visit  [x]   []       Other complaints Patient had his gastric band tightened 2/19 then loosened slightly on 2/26, loosened 2 more times since then all d/t regurgitation and fear of aspiration. []   [x]       Tobacco use    Clinical Outcomes:  Yes     No  []   [x]       Major bleeding event  []   [x]       Thromboembolic event    Duration of warfarin Therapy: indefinitely  INR Range:  2.0-3.0     INR 3.8 again today  Hold dose tonight only then decrease weekly dose to 5 mg on Mon, Wed & Fri and 7.5mg all other days  Encouraged patient to remain consistent with vit k intake as he can tolerate. Recheck INR in 2 weeks.     Referring cardiologist: Dr. Derik Gabriel  INR (no units) Date Value   03/14/2018 3.8   02/28/2018 3.8   02/05/2018 3.3   01/17/2018 2.2

## 2018-03-26 ENCOUNTER — ANTI-COAG VISIT (OUTPATIENT)
Dept: PHARMACY | Age: 73
End: 2018-03-26

## 2018-03-26 DIAGNOSIS — I48.91 ATRIAL FIBRILLATION, UNSPECIFIED TYPE (HCC): ICD-10-CM

## 2018-03-26 LAB
INR BLD: 2.7
PROTIME: 32.8 SECONDS

## 2018-04-01 ENCOUNTER — HOSPITAL ENCOUNTER (OUTPATIENT)
Dept: OTHER | Age: 73
Discharge: HOME | End: 2018-04-30
Attending: INTERNAL MEDICINE | Admitting: INTERNAL MEDICINE

## 2018-04-16 ENCOUNTER — ANTI-COAG VISIT (OUTPATIENT)
Dept: PHARMACY | Age: 73
End: 2018-04-16

## 2018-04-16 ENCOUNTER — TELEPHONE (OUTPATIENT)
Dept: PHARMACY | Age: 73
End: 2018-04-16

## 2018-04-16 DIAGNOSIS — I48.91 ATRIAL FIBRILLATION, UNSPECIFIED TYPE (HCC): ICD-10-CM

## 2018-04-16 LAB
INR BLD: 3.5
PROTIME: 41.7 SECONDS

## 2018-05-01 ENCOUNTER — HOSPITAL ENCOUNTER (OUTPATIENT)
Dept: OTHER | Age: 73
Discharge: HOME | End: 2018-05-31
Attending: INTERNAL MEDICINE | Admitting: INTERNAL MEDICINE

## 2018-05-07 ENCOUNTER — TELEPHONE (OUTPATIENT)
Dept: PHARMACY | Age: 73
End: 2018-05-07

## 2018-05-09 ENCOUNTER — ANTI-COAG VISIT (OUTPATIENT)
Dept: PHARMACY | Age: 73
End: 2018-05-09

## 2018-05-09 DIAGNOSIS — I48.91 ATRIAL FIBRILLATION, UNSPECIFIED TYPE (HCC): ICD-10-CM

## 2018-05-09 LAB
INR BLD: 3.7
PROTIME: 44.8 SECONDS

## 2018-05-23 ENCOUNTER — ANTI-COAG VISIT (OUTPATIENT)
Dept: PHARMACY | Age: 73
End: 2018-05-23

## 2018-05-23 ENCOUNTER — OFFICE VISIT (OUTPATIENT)
Dept: DERMATOLOGY | Age: 73
End: 2018-05-23

## 2018-05-23 DIAGNOSIS — L73.2 HIDRADENITIS SUPPURATIVA: ICD-10-CM

## 2018-05-23 DIAGNOSIS — L57.0 AK (ACTINIC KERATOSIS): Primary | ICD-10-CM

## 2018-05-23 DIAGNOSIS — L21.9 SEBORRHEIC DERMATITIS: ICD-10-CM

## 2018-05-23 DIAGNOSIS — I48.91 ATRIAL FIBRILLATION, UNSPECIFIED TYPE (HCC): ICD-10-CM

## 2018-05-23 LAB
INR BLD: 5.3
PROTIME: 63.1 SECONDS

## 2018-05-23 PROCEDURE — 1123F ACP DISCUSS/DSCN MKR DOCD: CPT | Performed by: DERMATOLOGY

## 2018-05-23 PROCEDURE — 3017F COLORECTAL CA SCREEN DOC REV: CPT | Performed by: DERMATOLOGY

## 2018-05-23 PROCEDURE — 99213 OFFICE O/P EST LOW 20 MIN: CPT | Performed by: DERMATOLOGY

## 2018-05-23 PROCEDURE — G8417 CALC BMI ABV UP PARAM F/U: HCPCS | Performed by: DERMATOLOGY

## 2018-05-23 PROCEDURE — G8427 DOCREV CUR MEDS BY ELIG CLIN: HCPCS | Performed by: DERMATOLOGY

## 2018-05-23 PROCEDURE — 1036F TOBACCO NON-USER: CPT | Performed by: DERMATOLOGY

## 2018-05-23 PROCEDURE — 4040F PNEUMOC VAC/ADMIN/RCVD: CPT | Performed by: DERMATOLOGY

## 2018-05-23 RX ORDER — CLINDAMYCIN PHOSPHATE 11.9 MG/ML
SOLUTION TOPICAL
Qty: 60 ML | Refills: 4 | Status: SHIPPED | OUTPATIENT
Start: 2018-05-23 | End: 2019-02-04

## 2018-05-24 ENCOUNTER — TELEPHONE (OUTPATIENT)
Dept: PHARMACY | Age: 73
End: 2018-05-24

## 2018-06-01 ENCOUNTER — HOSPITAL ENCOUNTER (OUTPATIENT)
Dept: OTHER | Age: 73
Discharge: HOME | End: 2018-06-30
Attending: INTERNAL MEDICINE | Admitting: INTERNAL MEDICINE

## 2018-06-01 ENCOUNTER — ANTI-COAG VISIT (OUTPATIENT)
Dept: PHARMACY | Age: 73
End: 2018-06-01

## 2018-06-01 DIAGNOSIS — I48.91 ATRIAL FIBRILLATION, UNSPECIFIED TYPE (HCC): ICD-10-CM

## 2018-06-01 LAB
INR BLD: 2.5
PROTIME: 29.9 SECONDS

## 2018-06-14 ENCOUNTER — ANTI-COAG VISIT (OUTPATIENT)
Dept: PHARMACY | Age: 73
End: 2018-06-14

## 2018-06-14 DIAGNOSIS — I48.91 ATRIAL FIBRILLATION, UNSPECIFIED TYPE (HCC): ICD-10-CM

## 2018-06-14 LAB
INR BLD: 1.9
PROTIME: 22.6 SECONDS

## 2018-06-15 ENCOUNTER — TELEPHONE (OUTPATIENT)
Dept: PHARMACY | Age: 73
End: 2018-06-15

## 2018-06-15 RX ORDER — QUETIAPINE FUMARATE 200 MG/1
800 TABLET, FILM COATED ORAL NIGHTLY
Qty: 60 TABLET | Status: SHIPPED | COMMUNITY
Start: 2018-06-15 | End: 2018-07-20 | Stop reason: DRUGHIGH

## 2018-06-29 ENCOUNTER — ANTI-COAG VISIT (OUTPATIENT)
Dept: PHARMACY | Age: 73
End: 2018-06-29

## 2018-06-29 DIAGNOSIS — I48.91 ATRIAL FIBRILLATION, UNSPECIFIED TYPE (HCC): ICD-10-CM

## 2018-06-29 LAB
INR BLD: 3.2
PROTIME: 38.6 SECONDS

## 2018-07-01 ENCOUNTER — HOSPITAL ENCOUNTER (OUTPATIENT)
Dept: OTHER | Age: 73
Discharge: HOME | End: 2018-07-31
Attending: INTERNAL MEDICINE | Admitting: INTERNAL MEDICINE

## 2018-07-20 ENCOUNTER — ANTI-COAG VISIT (OUTPATIENT)
Dept: PHARMACY | Age: 73
End: 2018-07-20

## 2018-07-20 DIAGNOSIS — I48.91 ATRIAL FIBRILLATION, UNSPECIFIED TYPE (HCC): ICD-10-CM

## 2018-07-20 LAB
INR BLD: 1.7
PROTIME: 20.1 SECONDS

## 2018-07-20 RX ORDER — CHOLECALCIFEROL (VITAMIN D3) 1250 MCG
1 CAPSULE ORAL WEEKLY
COMMUNITY
Start: 2018-06-22 | End: 2019-02-04

## 2018-07-20 RX ORDER — QUETIAPINE FUMARATE 400 MG/1
800 TABLET, FILM COATED ORAL NIGHTLY
COMMUNITY

## 2018-07-20 RX ORDER — PRIMIDONE 250 MG/1
750 TABLET ORAL NIGHTLY
COMMUNITY
End: 2022-02-25

## 2018-07-20 NOTE — PROGRESS NOTES
Mr. Roxanne Mckinney is a 67 y.o. y/o male with history of Afib   He presents today for anticoagulation monitoring and adjustment. Pertinent PMH: COPD, CHF, HTN  Patient was previously a patient here and then monitored through his cardiologist. Returned back to the clinic on 8/10/17. Patient has his lap band adjusted occasioanlly which will continue to affect his diet. Patient Reported Findings:  Yes     No  [x]   []       Patient verifies current dosing regimen as listed     []   [x]       S/S bleeding/bruising/swelling/SOB  []   [x]       Blood in urine or stool  []   [x]       Procedures scheduled in the future at this time   []   [x]       Missed dose  []   [x]       Extra dose  [x]   []       Change in medications increased primidone to 250mg ( will decrease INR) and Seroquel to 800mg (may slightly increase INR). Tapering off of Depakote by 500mg less  Every 10 days until off and added vitamin D 50,000 weekly. [x]   []       Change in health/diet/appetite He states that he has increased his vegetable diet since last INR. He states that it will continue as long as he can afford them.  []   [x]       Change in alcohol use  []   [x]       Change in activity  []   [x]       Hospital admission  []   [x]       Emergency department visit  []   [x]       Other complaints  []   [x]       Tobacco use    Clinical Outcomes:  Yes     No  []   [x]       Major bleeding event  []   [x]       Thromboembolic event    Duration of warfarin Therapy: indefinitely  INR Range:  2.0-3.0     INR 1.7 today. Increase weekly dose to 7.5 mg on Mon & Fri and 5 mg all other days. (6.7% increase)  Encouraged patient to remain consistent with vit k intake as he can tolerate. Recheck INR in 2 weeks, 8/3 to verify dose increase vs current medications and vegetable diet.     Referring cardiologist: Dr. Raúl Chung  INR (no units)   Date Value   07/20/2018 1.7   06/29/2018 3.2   06/14/2018 1.9   06/01/2018 2.5

## 2018-08-01 ENCOUNTER — HOSPITAL ENCOUNTER (OUTPATIENT)
Dept: OTHER | Age: 73
Discharge: HOME | End: 2018-08-31
Attending: INTERNAL MEDICINE | Admitting: INTERNAL MEDICINE

## 2018-08-02 ENCOUNTER — PATIENT MESSAGE (OUTPATIENT)
Dept: DERMATOLOGY | Age: 73
End: 2018-08-02

## 2018-08-03 ENCOUNTER — OFFICE VISIT (OUTPATIENT)
Dept: DERMATOLOGY | Age: 73
End: 2018-08-03

## 2018-08-03 ENCOUNTER — ANTI-COAG VISIT (OUTPATIENT)
Dept: PHARMACY | Age: 73
End: 2018-08-03

## 2018-08-03 DIAGNOSIS — I48.91 ATRIAL FIBRILLATION, UNSPECIFIED TYPE (HCC): ICD-10-CM

## 2018-08-03 DIAGNOSIS — L73.2 HIDRADENITIS SUPPURATIVA: Primary | ICD-10-CM

## 2018-08-03 LAB
INR BLD: 1.4
PROTIME: 17.2 SECONDS

## 2018-08-03 PROCEDURE — 11900 INJECT SKIN LESIONS </W 7: CPT | Performed by: DERMATOLOGY

## 2018-08-03 NOTE — PROGRESS NOTES
[facility]  Rika Bose MD  [phone]      [address]  [birth year]    67 y.o. male     Date of Visit: 8/3/2018    Chief Complaint: painful skin lesion    History of Present Illness:    He complains of a persistent painful lesion on the right buttock. Present for 2 months. May have ruptured this morning. Has hidradenitis suppurativa. Review of Systems:  Gen: no fevers or chills. Past Medical History, Family History, Surgical History, Medications and Allergies reviewed. Past Medical History:   Diagnosis Date    Alcoholism [facility])     sober since 2008    Allergic 1959    Seasonal Hay Fever    Anxiety     Asthma     Atrial fibrillation ([address]     cardioversion 8/4/11, 7/1/11, 2/16/11, 9/23/10    Atrial flutter ([address]     status post ablation    Bipolar 1 disorder ([address]     Cataract     2009: [facility] Surgery (Both Eyes[facility] at [facility]    CHF (congestive heart failure) (HCC)     Chronic insomnia     Colon polyps     on colonoscopy    COPD (chronic obstructive pulmonary disease) (HCC)     Depression     Elevated PSA 3/14    [place]    Gallstones     GERD (gastroesophageal reflux disease)     Hiatal hernia     small    Hidradenitis suppurativa     Hypertension     Insomnia     Microcytic anemia 10/14/2010    Morbid obesity (BMI 40.0 or higher)     Obstructive sleep apnea     on bipap 17/11resolved 3 years ago    Seizures ([address]     1973-One occurance. Unknown etiology. On Dilantin 1 year.      Past Surgical History:   Procedure Laterality Date    ABDOMEN SURGERY      gastric band    [place]    ATRIAL ABLATION SURGERY  8/5/11    atrial flutter ablation    ATRIAL ABLATION SURGERY  9/1/11    cryoablation for atrial fibrillation    ATRIAL ABLATION SURGERY  9/3/15    RFCA for AF with PVI    BARIATRIC SURGERY  8/5/13    band over bypass    CARDIOVERSION  x4    [facility] REM[facility]AL [facility]    CHOLECYSTECTOMY, LAPAROSCOPIC N/A 06/20/2016    LAPAROSCOPIC CHOLECYSTECTOMY WITH CHOLANGIOGRAM    COLONOSCOPY  10/1/09, 10/1/12    [place], repeat 3 years    CYST REMOVAL      from chest    EYE SURGERY      Cataract removal - both eyes - performed at [facility].  HEMORRHOID SURGERY  3/23/16    HERNIA REPAIR      Repaired in conjunction with Band Over Bypass, 2013.  OTHER SURGICAL HISTORY  9/21/15    excision vivian rectal cyst    DIMITRIS-EN-Y GASTRIC BYPASS  2001    [place], [place]    SMALL INTESTINE SURGERY      As part of R-N-Y procedure    UPPER GASTROINTESTINAL ENDOSCOPY  5/13/13    [place]       No Known Allergies  Outpatient Prescriptions Marked as Taking for the 8/3/18 encounter (Office Visit) with Elina Moura MD   Medication Sig Dispense Refill    Cholecalciferol (VITAMIN D3) 66967 units CAPS Take 1 capsule by mouth once a week      primidone (MYSOLINE) 250 MG tablet Take 250 mg by mouth nightly      QUEtiapine (SEROQUEL) 400 MG tablet Take 800 mg by mouth nightly      clindamycin (CLEOCIN T) 1 % external solution Apply to affected areas in the groin and abdomen twice daily. 60 mL 4    hydrocortisone 2.5 % cream Apply to red areas on the face twice daily for few days if needed.  30 g 2    warfarin (COUMADIN) 5 MG tablet Take 1 to 1 1/2 tabs daily as directed (Patient taking differently: See Admin Instructions Warfarin dose managed my [facility].) 135 tablet 1    vitamin B-12 (CYANOCOBALAMIN) 1000 MCG tablet Take 1,000 mcg by mouth daily      UNABLE TO FIND Indications: COSMOS TRIAL DRUG      divalproex (DEPAKOTE) 500 MG DR tablet Take 500 mg by mouth 3 times daily      atorvastatin (LIPITOR) 20 MG tablet Take 1 tablet by mouth daily      docusate sodium (COLACE) 100 MG capsule Take 1 capsule by mouth 2 times daily 30 capsule 0    polyethylene glycol (GLYCOLAX) packet Take 17 g by mouth daily as needed for Constipation      b complex vitamins capsule Take 1

## 2018-08-20 ENCOUNTER — ANTI-COAG VISIT (OUTPATIENT)
Dept: PHARMACY | Age: 73
End: 2018-08-20

## 2018-08-20 DIAGNOSIS — I48.91 ATRIAL FIBRILLATION, UNSPECIFIED TYPE (HCC): ICD-10-CM

## 2018-08-20 LAB
INR BLD: 1.3
PROTIME: 15.1 SECONDS

## 2018-09-01 ENCOUNTER — HOSPITAL ENCOUNTER (OUTPATIENT)
Dept: OTHER | Age: 73
Discharge: HOME | End: 2018-09-01
Attending: INTERNAL MEDICINE | Admitting: INTERNAL MEDICINE

## 2018-09-10 ENCOUNTER — ANTI-COAG VISIT (OUTPATIENT)
Dept: PHARMACY | Age: 73
End: 2018-09-10

## 2018-09-10 DIAGNOSIS — I48.91 ATRIAL FIBRILLATION, UNSPECIFIED TYPE (HCC): ICD-10-CM

## 2018-09-10 LAB
INR BLD: 1.7
PROTIME: 21 SECONDS

## 2018-09-10 NOTE — PROGRESS NOTES
Mr. Jonatan Howe is a 75 y.o. y/o male with history of Afib Returned back to the clinic on 8/10/17. He presents today for anticoagulation monitoring and adjustment. Pertinent PMH: COPD, CHF, HTN  Patient has his lap band adjusted occasioanly which will continue to affect his diet. Patient Reported Findings:  Yes     No  [x]   []       Patient verifies current dosing regimen as listed     []   [x]       S/S bleeding/bruising/swelling/SOB  []   [x]       Blood in urine or stool  []   [x]       Procedures scheduled in the future at this time   []   [x]       Missed dose  []   [x]       Extra dose  []   [x]       Change in medications Primidone will depress the INR. May still be adjusting warfarin dose d/t the influence of the dose increase of the primidone in early July and had been reported by patient in early Aug. Of this year. [x]   []       Change in health/diet/appetite He states he had followed instructions from last visit to eat a vegetable only twice a week. []   [x]       Change in alcohol use  []   [x]       Change in activity  []   [x]       Hospital admission  []   [x]       Emergency department visit  [x]   []       Other complaints possibly beginning to struggle with memory as statements regarding vit k intake are largely different for every visit   []   [x]       Tobacco use    Clinical Outcomes:  Yes     No  []   [x]       Major bleeding event  []   [x]       Thromboembolic event  Uses a pillbox  Duration of warfarin Therapy: indefinitely  INR Range:  2.0-3.0     INR 1.7 today. Increase weekly dose to 5mg on Sun & Thu and 7.5mg all other days(5.6% increase)  Encouraged patient to remain consistent with vit k intake as he can tolerate, a vegetable twice a week.   Recheck INR in 2 weeks on 9/24    Referring cardiologist: Dr. Ac Vasquez  INR (no units)   Date Value   08/20/2018 1.3   08/03/2018 1.4   07/20/2018 1.7   06/29/2018 3.2

## 2018-09-24 ENCOUNTER — ANTI-COAG VISIT (OUTPATIENT)
Dept: PHARMACY | Age: 73
End: 2018-09-24
Payer: MEDICARE

## 2018-09-24 DIAGNOSIS — I48.91 ATRIAL FIBRILLATION, UNSPECIFIED TYPE (HCC): ICD-10-CM

## 2018-09-24 PROCEDURE — 85610 PROTHROMBIN TIME: CPT

## 2018-09-24 PROCEDURE — 99211 OFF/OP EST MAY X REQ PHY/QHP: CPT

## 2018-10-08 ENCOUNTER — ANTI-COAG VISIT (OUTPATIENT)
Dept: PHARMACY | Age: 73
End: 2018-10-08
Payer: MEDICARE

## 2018-10-08 DIAGNOSIS — I48.91 ATRIAL FIBRILLATION, UNSPECIFIED TYPE (HCC): ICD-10-CM

## 2018-10-08 LAB — INTERNATIONAL NORMALIZATION RATIO, POC: 2.9

## 2018-10-08 PROCEDURE — 85610 PROTHROMBIN TIME: CPT

## 2018-10-08 PROCEDURE — 99211 OFF/OP EST MAY X REQ PHY/QHP: CPT

## 2018-10-22 ENCOUNTER — ANTI-COAG VISIT (OUTPATIENT)
Dept: PHARMACY | Age: 73
End: 2018-10-22
Payer: MEDICARE

## 2018-10-22 DIAGNOSIS — I48.91 ATRIAL FIBRILLATION, UNSPECIFIED TYPE (HCC): ICD-10-CM

## 2018-10-22 LAB — INTERNATIONAL NORMALIZATION RATIO, POC: 1.8

## 2018-10-22 PROCEDURE — 85610 PROTHROMBIN TIME: CPT

## 2018-10-22 PROCEDURE — 99211 OFF/OP EST MAY X REQ PHY/QHP: CPT

## 2018-11-01 ENCOUNTER — OFFICE VISIT (OUTPATIENT)
Dept: SURGERY | Age: 73
End: 2018-11-01
Payer: MEDICARE

## 2018-11-01 VITALS
HEIGHT: 69 IN | BODY MASS INDEX: 31.1 KG/M2 | SYSTOLIC BLOOD PRESSURE: 117 MMHG | DIASTOLIC BLOOD PRESSURE: 78 MMHG | WEIGHT: 210 LBS

## 2018-11-01 DIAGNOSIS — R19.01 RIGHT UPPER QUADRANT ABDOMINAL MASS: ICD-10-CM

## 2018-11-01 DIAGNOSIS — Z98.84 LAP-BAND SURGERY STATUS: Primary | ICD-10-CM

## 2018-11-01 PROCEDURE — 4040F PNEUMOC VAC/ADMIN/RCVD: CPT | Performed by: SURGERY

## 2018-11-01 PROCEDURE — G8484 FLU IMMUNIZE NO ADMIN: HCPCS | Performed by: SURGERY

## 2018-11-01 PROCEDURE — 1036F TOBACCO NON-USER: CPT | Performed by: SURGERY

## 2018-11-01 PROCEDURE — 1123F ACP DISCUSS/DSCN MKR DOCD: CPT | Performed by: SURGERY

## 2018-11-01 PROCEDURE — G8427 DOCREV CUR MEDS BY ELIG CLIN: HCPCS | Performed by: SURGERY

## 2018-11-01 PROCEDURE — 99213 OFFICE O/P EST LOW 20 MIN: CPT | Performed by: SURGERY

## 2018-11-01 PROCEDURE — 3017F COLORECTAL CA SCREEN DOC REV: CPT | Performed by: SURGERY

## 2018-11-01 PROCEDURE — G8417 CALC BMI ABV UP PARAM F/U: HCPCS | Performed by: SURGERY

## 2018-11-01 PROCEDURE — 1101F PT FALLS ASSESS-DOCD LE1/YR: CPT | Performed by: SURGERY

## 2018-11-01 ASSESSMENT — ENCOUNTER SYMPTOMS
RESPIRATORY NEGATIVE: 1
ALLERGIC/IMMUNOLOGIC NEGATIVE: 1
EYES NEGATIVE: 1
GASTROINTESTINAL NEGATIVE: 1

## 2018-11-12 ENCOUNTER — ANTI-COAG VISIT (OUTPATIENT)
Dept: PHARMACY | Age: 73
End: 2018-11-12
Payer: MEDICARE

## 2018-11-12 DIAGNOSIS — I48.91 ATRIAL FIBRILLATION, UNSPECIFIED TYPE (HCC): ICD-10-CM

## 2018-11-12 LAB — INTERNATIONAL NORMALIZATION RATIO, POC: 3.2

## 2018-11-12 PROCEDURE — 99211 OFF/OP EST MAY X REQ PHY/QHP: CPT

## 2018-11-12 PROCEDURE — 85610 PROTHROMBIN TIME: CPT

## 2018-11-26 RX ORDER — WARFARIN SODIUM 5 MG/1
TABLET ORAL
Qty: 130 TABLET | Refills: 2 | OUTPATIENT
Start: 2018-11-26

## 2018-12-03 ENCOUNTER — ANTI-COAG VISIT (OUTPATIENT)
Dept: PHARMACY | Age: 73
End: 2018-12-03
Payer: MEDICARE

## 2018-12-03 DIAGNOSIS — I48.91 ATRIAL FIBRILLATION, UNSPECIFIED TYPE (HCC): ICD-10-CM

## 2018-12-03 LAB — INTERNATIONAL NORMALIZATION RATIO, POC: 1.3

## 2018-12-03 PROCEDURE — 85610 PROTHROMBIN TIME: CPT

## 2018-12-03 PROCEDURE — 99212 OFFICE O/P EST SF 10 MIN: CPT

## 2018-12-17 ENCOUNTER — ANTI-COAG VISIT (OUTPATIENT)
Dept: PHARMACY | Age: 73
End: 2018-12-17
Payer: MEDICARE

## 2018-12-17 DIAGNOSIS — I48.91 ATRIAL FIBRILLATION, UNSPECIFIED TYPE (HCC): ICD-10-CM

## 2018-12-17 LAB — INTERNATIONAL NORMALIZATION RATIO, POC: 1.4

## 2018-12-17 PROCEDURE — 85610 PROTHROMBIN TIME: CPT

## 2018-12-17 PROCEDURE — 99212 OFFICE O/P EST SF 10 MIN: CPT

## 2019-01-07 ENCOUNTER — ANTI-COAG VISIT (OUTPATIENT)
Dept: PHARMACY | Age: 74
End: 2019-01-07
Payer: MEDICARE

## 2019-01-07 DIAGNOSIS — I48.91 ATRIAL FIBRILLATION, UNSPECIFIED TYPE (HCC): ICD-10-CM

## 2019-01-07 LAB — INTERNATIONAL NORMALIZATION RATIO, POC: 2

## 2019-01-07 PROCEDURE — 85610 PROTHROMBIN TIME: CPT

## 2019-01-07 PROCEDURE — 99211 OFF/OP EST MAY X REQ PHY/QHP: CPT

## 2019-01-21 ENCOUNTER — TELEPHONE (OUTPATIENT)
Dept: PHARMACY | Age: 74
End: 2019-01-21

## 2019-01-21 RX ORDER — TRAZODONE HYDROCHLORIDE 50 MG/1
150 TABLET ORAL NIGHTLY
COMMUNITY
End: 2021-02-05

## 2019-01-22 ENCOUNTER — HOSPITAL ENCOUNTER (OUTPATIENT)
Age: 74
Discharge: HOME | End: 2019-01-22
Payer: MEDICARE

## 2019-01-22 ENCOUNTER — TELEPHONE (OUTPATIENT)
Dept: PHARMACY | Age: 74
End: 2019-01-22

## 2019-01-22 LAB
A/G RATIO: 1.5 (ref 1.1–2.2)
ALBUMIN SERPL-MCNC: 4.3 G/DL (ref 3.4–5)
ALP BLD-CCNC: 88 U/L (ref 40–129)
ALT SERPL-CCNC: 14 U/L (ref 10–40)
ANION GAP SERPL CALCULATED.3IONS-SCNC: 12 MMOL/L (ref 3–16)
AST SERPL-CCNC: 15 U/L (ref 15–37)
BILIRUB SERPL-MCNC: 0.4 MG/DL (ref 0–1)
BUN BLDV-MCNC: 11 MG/DL (ref 7–20)
CALCIUM SERPL-MCNC: 9 MG/DL (ref 8.3–10.6)
CHLORIDE BLD-SCNC: 103 MMOL/L (ref 99–110)
CHOLESTEROL, TOTAL: 188 MG/DL (ref 0–199)
CO2: 26 MMOL/L (ref 21–32)
CREAT SERPL-MCNC: 0.6 MG/DL (ref 0.8–1.3)
GFR AFRICAN AMERICAN: >60
GFR NON-AFRICAN AMERICAN: >60
GLOBULIN: 2.9 G/DL
GLUCOSE BLD-MCNC: 109 MG/DL (ref 70–99)
HDLC SERPL-MCNC: 69 MG/DL (ref 40–60)
LDL CHOLESTEROL CALCULATED: 101 MG/DL
POTASSIUM SERPL-SCNC: 4.8 MMOL/L (ref 3.5–5.1)
SODIUM BLD-SCNC: 141 MMOL/L (ref 136–145)
TOTAL PROTEIN: 7.2 G/DL (ref 6.4–8.2)
TRIGL SERPL-MCNC: 88 MG/DL (ref 0–150)
VLDLC SERPL CALC-MCNC: 18 MG/DL

## 2019-01-22 PROCEDURE — 80061 LIPID PANEL: CPT

## 2019-01-22 PROCEDURE — 80053 COMPREHEN METABOLIC PANEL: CPT

## 2019-01-22 PROCEDURE — 36415 COLL VENOUS BLD VENIPUNCTURE: CPT

## 2019-02-04 ENCOUNTER — ANTI-COAG VISIT (OUTPATIENT)
Dept: PHARMACY | Age: 74
End: 2019-02-04
Payer: MEDICARE

## 2019-02-04 ENCOUNTER — OFFICE VISIT (OUTPATIENT)
Dept: CARDIOLOGY CLINIC | Age: 74
End: 2019-02-04
Payer: MEDICARE

## 2019-02-04 VITALS
HEIGHT: 69 IN | SYSTOLIC BLOOD PRESSURE: 110 MMHG | DIASTOLIC BLOOD PRESSURE: 70 MMHG | WEIGHT: 225 LBS | BODY MASS INDEX: 33.33 KG/M2 | HEART RATE: 64 BPM

## 2019-02-04 DIAGNOSIS — I48.91 ATRIAL FIBRILLATION, UNSPECIFIED TYPE (HCC): Primary | ICD-10-CM

## 2019-02-04 DIAGNOSIS — I48.91 ATRIAL FIBRILLATION, UNSPECIFIED TYPE (HCC): ICD-10-CM

## 2019-02-04 LAB — INTERNATIONAL NORMALIZATION RATIO, POC: 1.8

## 2019-02-04 PROCEDURE — 3017F COLORECTAL CA SCREEN DOC REV: CPT | Performed by: NURSE PRACTITIONER

## 2019-02-04 PROCEDURE — 1101F PT FALLS ASSESS-DOCD LE1/YR: CPT | Performed by: NURSE PRACTITIONER

## 2019-02-04 PROCEDURE — 99211 OFF/OP EST MAY X REQ PHY/QHP: CPT

## 2019-02-04 PROCEDURE — 4040F PNEUMOC VAC/ADMIN/RCVD: CPT | Performed by: NURSE PRACTITIONER

## 2019-02-04 PROCEDURE — 1036F TOBACCO NON-USER: CPT | Performed by: NURSE PRACTITIONER

## 2019-02-04 PROCEDURE — G8417 CALC BMI ABV UP PARAM F/U: HCPCS | Performed by: NURSE PRACTITIONER

## 2019-02-04 PROCEDURE — 85610 PROTHROMBIN TIME: CPT

## 2019-02-04 PROCEDURE — G8484 FLU IMMUNIZE NO ADMIN: HCPCS | Performed by: NURSE PRACTITIONER

## 2019-02-04 PROCEDURE — G8427 DOCREV CUR MEDS BY ELIG CLIN: HCPCS | Performed by: NURSE PRACTITIONER

## 2019-02-04 PROCEDURE — 93000 ELECTROCARDIOGRAM COMPLETE: CPT | Performed by: NURSE PRACTITIONER

## 2019-02-04 PROCEDURE — 1123F ACP DISCUSS/DSCN MKR DOCD: CPT | Performed by: NURSE PRACTITIONER

## 2019-02-04 PROCEDURE — 99214 OFFICE O/P EST MOD 30 MIN: CPT | Performed by: NURSE PRACTITIONER

## 2019-02-21 ENCOUNTER — TELEPHONE (OUTPATIENT)
Dept: PHARMACY | Age: 74
End: 2019-02-21

## 2019-02-21 RX ORDER — FLUOXETINE HYDROCHLORIDE 20 MG/1
20 CAPSULE ORAL DAILY
COMMUNITY
Start: 2019-02-18 | End: 2019-03-18 | Stop reason: DRUGHIGH

## 2019-03-11 ENCOUNTER — ANTI-COAG VISIT (OUTPATIENT)
Dept: PHARMACY | Age: 74
End: 2019-03-11
Payer: MEDICARE

## 2019-03-11 DIAGNOSIS — I48.91 ATRIAL FIBRILLATION, UNSPECIFIED TYPE (HCC): ICD-10-CM

## 2019-03-11 LAB — INTERNATIONAL NORMALIZATION RATIO, POC: 3.2

## 2019-03-11 PROCEDURE — 99211 OFF/OP EST MAY X REQ PHY/QHP: CPT

## 2019-03-11 PROCEDURE — 85610 PROTHROMBIN TIME: CPT

## 2019-03-11 RX ORDER — MONTELUKAST SODIUM 10 MG/1
1 TABLET ORAL NIGHTLY
COMMUNITY
Start: 2019-01-21 | End: 2021-07-06 | Stop reason: SDUPTHER

## 2019-03-11 RX ORDER — FERROUS SULFATE 325(65) MG
325 TABLET ORAL WEEKLY
COMMUNITY
End: 2020-09-23 | Stop reason: ALTCHOICE

## 2019-03-11 RX ORDER — ERGOCALCIFEROL 1.25 MG/1
1 CAPSULE ORAL DAILY
COMMUNITY
Start: 2018-12-10 | End: 2020-09-23 | Stop reason: DRUGHIGH

## 2019-03-18 ENCOUNTER — TELEPHONE (OUTPATIENT)
Dept: PHARMACY | Age: 74
End: 2019-03-18

## 2019-03-18 RX ORDER — FLUOXETINE HYDROCHLORIDE 40 MG/1
60 CAPSULE ORAL DAILY
COMMUNITY
Start: 2019-03-18 | End: 2021-02-05

## 2019-04-08 ENCOUNTER — ANTI-COAG VISIT (OUTPATIENT)
Dept: PHARMACY | Age: 74
End: 2019-04-08
Payer: MEDICARE

## 2019-04-08 DIAGNOSIS — I48.91 ATRIAL FIBRILLATION, UNSPECIFIED TYPE (HCC): ICD-10-CM

## 2019-04-08 LAB — INTERNATIONAL NORMALIZATION RATIO, POC: 1.8

## 2019-04-08 PROCEDURE — 85610 PROTHROMBIN TIME: CPT

## 2019-04-08 PROCEDURE — 99211 OFF/OP EST MAY X REQ PHY/QHP: CPT

## 2019-04-08 NOTE — PROGRESS NOTES
Mr. Dawood Adams is a 75 y.o. y/o male with history of Afib   He presents today for anticoagulation monitoring and adjustment. Pertinent PMH: COPD, CHF, HTN  Patient has his lap band adjusted occasioanly which will continue to affect his diet. Patient Reported Findings:  Yes     No  [x]   []       Patient verifies current dosing regimen as listed     []   [x]       S/S bleeding/bruising/swelling/SOB  []   [x]       Blood in urine or stool  []   [x]       Procedures scheduled in the future at this time   []   [x]       Missed dose   []   [x]       Extra dose  [x]   []       Change in medications Prozac dose was increased to 40mg about 3 weeks per his phone call on 3/18. []   [x]       Change in health/diet/appetite He has green beans 2 x last week and has broccoli 4 times a week. He states that his diet has not changed. He will also have mixed vegetables depending on other food available on the menu. (tani slaw, green beans, corn). He tries to keep Vit K intake consistent. []   [x]       Change in alcohol use  []   [x]       Change in activity  []   [x]       Hospital admission  []   [x]       Emergency department visit  []   [x]       Other complaints   []   [x]       Tobacco use  Stopped smoking as of Wed 11/28/18. Former smoked 2 1/2 ppd. Clinical Outcomes:  Yes     No  []   [x]       Major bleeding event  []   [x]       Thromboembolic event  Uses a pillbox  Duration of warfarin Therapy: indefinitely  INR Range:  2.0-3.0     He is quick to state that nothing has changed. INR 1.8 today after dose decrease at last visit. Return to weekly dose of 10mg on Mon, Wed & Fri and 7.5mg all other days. Since patient states that he is very consistent with his diet will increase dose. Would rather the INR to be a little high than too low  Encouraged patient to remain consistent with vit k intake. Recheck INR in 4 weeks on 5/6  He can only come on Mondays or Wednesdays.     Referring cardiologist:  Yvette Gtz  INR (no units)   Date Value   04/08/2019 1.8   03/11/2019 3.2   02/04/2019 1.8   01/07/2019 2.0   09/10/2018 1.7   08/20/2018 1.3   08/03/2018 1.4   07/20/2018 1.7

## 2019-05-06 ENCOUNTER — ANTI-COAG VISIT (OUTPATIENT)
Dept: PHARMACY | Age: 74
End: 2019-05-06
Payer: MEDICARE

## 2019-05-06 DIAGNOSIS — I48.91 ATRIAL FIBRILLATION, UNSPECIFIED TYPE (HCC): ICD-10-CM

## 2019-05-06 LAB — INTERNATIONAL NORMALIZATION RATIO, POC: 1.5

## 2019-05-06 PROCEDURE — 99212 OFFICE O/P EST SF 10 MIN: CPT

## 2019-05-06 PROCEDURE — 85610 PROTHROMBIN TIME: CPT

## 2019-05-06 NOTE — PROGRESS NOTES
Wednesdays d/t ride availability.     **Do not use a High Lighter on his AVS**    Referring cardiologist: Dr. Dejah Orantes  INR (no units)   Date Value   05/06/2019 1.5   04/08/2019 1.8   03/11/2019 3.2   02/04/2019 1.8   09/10/2018 1.7   08/20/2018 1.3   08/03/2018 1.4   07/20/2018 1.7

## 2019-05-21 ENCOUNTER — TELEPHONE (OUTPATIENT)
Dept: PHARMACY | Age: 74
End: 2019-05-21

## 2019-05-21 NOTE — TELEPHONE ENCOUNTER
Patient has an 11am appt on 6/3. Needs later time due to other procedures @ [facility]. Called and r/s him for 11:45am appt.

## 2019-06-03 ENCOUNTER — ANTI-COAG VISIT (OUTPATIENT)
Dept: PHARMACY | Age: 74
End: 2019-06-03
Payer: MEDICARE

## 2019-06-03 DIAGNOSIS — I48.91 ATRIAL FIBRILLATION, UNSPECIFIED TYPE (HCC): ICD-10-CM

## 2019-06-03 LAB — INTERNATIONAL NORMALIZATION RATIO, POC: 3

## 2019-06-03 PROCEDURE — 99211 OFF/OP EST MAY X REQ PHY/QHP: CPT

## 2019-06-03 PROCEDURE — 85610 PROTHROMBIN TIME: CPT

## 2019-06-03 NOTE — PROGRESS NOTES
in 4 weeks. He can only come on Mondays or Wednesdays d/t ride availability. **DO NOT use a High Lighter on his AVS** Just point out the appropriate areas of discussion.      Referring cardiologist: Dr. Dior Mauricio  INR (no units)   Date Value   06/03/2019 3.0   05/06/2019 1.5   04/08/2019 1.8   03/11/2019 3.2   09/10/2018 1.7   08/20/2018 1.3   08/03/2018 1.4   07/20/2018 1.7

## 2019-06-11 ENCOUNTER — TELEPHONE (OUTPATIENT)
Dept: PHARMACY | Age: 74
End: 2019-06-11

## 2019-06-11 NOTE — TELEPHONE ENCOUNTER
The increase in dose of primidone can dec INR slightly. Most recent INR was 3, but weekly dose was dec. Returned call to patient. Explained above. Advised for patient to return to weekly dose of 7.5 mg on Sun and Thurs and 10 mg all other days of the week. Pt refuses to RTC sooner than next scheduled appt. Explained to patient the risks of not returning to clinic since medicine changes can lower INR. Advised that if notice any s/s of clot to go to ED. Will assess INR 7/8 since pt refusing to RTC.

## 2019-06-11 NOTE — TELEPHONE ENCOUNTER
----- Message from Georgina Marin sent at 6/11/2019  7:29 AM EDT -----  Contact: Patient  Patient called to say his Primidone has been increased from 500mg daily to 750mg daily @ . Please call re: warfarin. [phone].

## 2019-06-17 ENCOUNTER — OFFICE VISIT (OUTPATIENT)
Dept: DERMATOLOGY | Age: 74
End: 2019-06-17
Payer: MEDICARE

## 2019-06-17 DIAGNOSIS — L73.2 HIDRADENITIS SUPPURATIVA: ICD-10-CM

## 2019-06-17 DIAGNOSIS — L21.9 SEBORRHEIC DERMATITIS: ICD-10-CM

## 2019-06-17 DIAGNOSIS — L57.0 AK (ACTINIC KERATOSIS): Primary | ICD-10-CM

## 2019-06-17 PROCEDURE — 3017F COLORECTAL CA SCREEN DOC REV: CPT | Performed by: DERMATOLOGY

## 2019-06-17 PROCEDURE — 1123F ACP DISCUSS/DSCN MKR DOCD: CPT | Performed by: DERMATOLOGY

## 2019-06-17 PROCEDURE — G8417 CALC BMI ABV UP PARAM F/U: HCPCS | Performed by: DERMATOLOGY

## 2019-06-17 PROCEDURE — 99213 OFFICE O/P EST LOW 20 MIN: CPT | Performed by: DERMATOLOGY

## 2019-06-17 PROCEDURE — 1036F TOBACCO NON-USER: CPT | Performed by: DERMATOLOGY

## 2019-06-17 PROCEDURE — 4040F PNEUMOC VAC/ADMIN/RCVD: CPT | Performed by: DERMATOLOGY

## 2019-06-17 PROCEDURE — G8427 DOCREV CUR MEDS BY ELIG CLIN: HCPCS | Performed by: DERMATOLOGY

## 2019-06-17 NOTE — PROGRESS NOTES
[facility]  Sonya Yi MD  [phone]      Randy Flanagan  [birth year]    75 y.o. male     Date of Visit: 6/17/2019    Chief Complaint: skin lesions    History of Present Illness:    1. Follow-up for history of actinic keratoses on the scalp-has few asymptomatic lesions today. None are bothersome. 2.  F/u for hidradenitis suppurativa of the abdomen and groin - has had less frequently lesions that rupture and heal.     3.  Follow-up for facial seborrheic dermatitis-still waxes and wanes. Improves with hydrocortisone 2.5% cream.      Review of Systems:  Skin: No new or changing moles. Past Medical History, Family History, Surgical History, Medications and Allergies reviewed. Past Medical History:   Diagnosis Date    Alcoholism [facility])     sober since 2008    Allergic 1959    Seasonal Hay Fever    Anxiety     Asthma     Atrial fibrillation ([address]     cardioversion 8/4/11, 7/1/11, 2/16/11, 9/23/10    Atrial flutter ([address]     status post ablation    Bipolar 1 disorder ([address]     Cataract     2009: [facility] Eyes[facility]    CHF (congestive heart failure) (HCC)     Chronic insomnia     Colon polyps     on colonoscopy    COPD (chronic obstructive pulmonary disease) (HCC)     Depression     Elevated PSA 3/14    Alexander    Gallstones     GERD (gastroesophageal reflux disease)     Hiatal hernia     small    Hidradenitis suppurativa     Hypertension     Insomnia     Microcytic anemia 10/14/2010    Morbid obesity (BMI 40.0 or higher)     Obstructive sleep apnea     on bipap 17/11resolved 3 years ago    Seizures ([address]     1973-One occurance. Unknown etiology. On Dilantin 1 year.      Past Surgical History:   Procedure Laterality Date    ABDOMEN SURGERY      gastric band    [place]    ATRIAL ABLATION SURGERY  8/5/11    atrial flutter ablation    ATRIAL ABLATION SURGERY  9/1/11    cryoablation for atrial fibrillation    ATRIAL ABLATION SURGERY  9/3/15    RFCA for AF with PVI    BARIATRIC SURGERY  8/5/13    band over bypass    CARDIOVERSION  x4    CATARACT REMOVAL Bilateral 2009    [address], LAPAROSCOPIC N/A 06/20/2016    LAPAROSCOPIC CHOLECYSTECTOMY WITH CHOLANGIOGRAM    COLONOSCOPY  10/1/09, 10/1/12    [place], repeat 3 years    CYST REMOVAL      from chest    EYE SURGERY      Cataract removal - both eyes - [facility].  HEMORRHOID SURGERY  3/23/16    HERNIA REPAIR      Repaired in conjunction with Band Over Bypass, 2013.     OTHER SURGICAL HISTORY  9/21/15    excision vivian rectal cyst    DIMITRIS-EN-Y GASTRIC BYPASS  2001    [facility]    SMALL INTESTINE SURGERY      As part of R-N-Y procedure    UPPER GASTROINTESTINAL ENDOSCOPY  5/13/13    [place]       No Known Allergies  Outpatient Medications Marked as Taking for the 6/17/19 encounter (Office Visit) with Keith Wilson MD   Medication Sig Dispense Refill    FLUoxetine (PROZAC) 40 MG capsule Take 1 capsule by mouth daily      ADVAIR DISKUS 250-50 MCG/DOSE AEPB Inhale 1 puff into the lungs 2 times daily  0    montelukast (SINGULAIR) 10 MG tablet Take 1 tablet by mouth nightly      vitamin D (ERGOCALCIFEROL) 70957 units CAPS capsule Take 1 capsule by mouth daily      ferrous sulfate 325 (65 Fe) MG tablet Take 325 mg by mouth once a week 3 times weekly      traZODone (DESYREL) 50 MG tablet Take 200 mg by mouth nightly       primidone (MYSOLINE) 250 MG tablet Take 750 mg by mouth nightly       QUEtiapine (SEROQUEL) 400 MG tablet Take 800 mg by mouth nightly      warfarin (COUMADIN) 5 MG tablet Take 1 to 1 1/2 tabs daily as directed (Patient taking differently: See Admin Instructions Warfarin dose managed my [facility] 135 tablet 1    vitamin B-12 (CYANOCOBALAMIN) 1000 MCG tablet Take 1,000 mcg by mouth daily      UNABLE TO FIND Indications: COSMOS TRIAL DRUG      atorvastatin (LIPITOR) 20 MG tablet Take 1 tablet by mouth daily      b complex vitamins capsule Take 1 capsule by mouth daily      zinc 50 MG CAPS Take by mouth      Melatonin 10 MG TABS Take 20 mg by mouth nightly      albuterol (PROVENTIL HFA) 108 (90 BASE) MCG/ACT inhaler Inhale 2 puffs into the lungs every 6 hours as needed for Wheezing. 1 Inhaler 3         Physical Examination       The following were examined and determined to be normal: Psych/Neuro, Conjunctivae/eyelids, Gums/teeth/lips, Neck, Breast/axilla/chest, Abdomen, Back, RUE, LUE, RLE, LLE and Nails/digits. The following were examined and determined to be abnormal: Scalp/hair and Head/face. Well-appearing. 1.  Vertex scalp with few skin colored scaly macules. 2.  Clear. 3.  Right upper lip and left lower cutaneous lip with ill-defined erythematous patches with greasy scaling. Assessment and Plan     1. AK (actinic keratosis) - few, small and asymptomatic    We discussed the low premalignant potential.  Continue sun protective behaviors. 2. Hidradenitis suppurativa - quiescent right now    Return for intralesional Kenalog in the future if needed. 3. Seborrheic dermatitis of the face - mild    Hydrocortisone 2.5% cream twice daily as needed. Return in about 1 year (around 6/17/2020).

## 2019-07-08 ENCOUNTER — ANTI-COAG VISIT (OUTPATIENT)
Dept: PHARMACY | Age: 74
End: 2019-07-08
Payer: MEDICARE

## 2019-07-08 DIAGNOSIS — I48.91 ATRIAL FIBRILLATION, UNSPECIFIED TYPE (HCC): ICD-10-CM

## 2019-07-08 LAB — INTERNATIONAL NORMALIZATION RATIO, POC: 3.6

## 2019-07-08 PROCEDURE — 99211 OFF/OP EST MAY X REQ PHY/QHP: CPT

## 2019-07-08 PROCEDURE — 85610 PROTHROMBIN TIME: CPT

## 2019-07-08 NOTE — PROGRESS NOTES
Mr. Otto Vail is a 75 y.o. y/o male with history of Afib   He presents today for anticoagulation monitoring and adjustment. Pertinent PMH: COPD, CHF, HTN  Patient has his lap band adjusted occasioanly which will continue to affect his diet. Patient Reported Findings:  Yes     No  [x]   []       Patient verifies current dosing regimen as listed     []   [x]       S/S bleeding/bruising/swelling/SOB  []   [x]       Blood in urine or stool  []   [x]       Procedures scheduled in the future at this time   []   [x]       Missed dose   []   [x]       Extra dose  [x]   []       Change in medications started prozac. Updated med list     []   [x]       Change in health/diet/appetite He has had  green beans 2 x last week and has had broccoli 4 times a week which is an increase. He will also have mixed vegetables depending on other food available on the menu. (tani slaw, green beans, corn). He tries to keep Vit K intake consistent, but sometimes has trouble.  []   [x]       Change in alcohol use  []   [x]       Change in activity  []   [x]       Hospital admission  []   [x]       Emergency department visit  []   [x]       Other complaints   []   [x]       Tobacco use  Stopped smoking as of Wed 11/28/19. Former smoked 2 1/2 ppd. Clinical Outcomes:  Yes     No  []   [x]       Major bleeding event  []   [x]       Thromboembolic event  Uses a pillbox  Duration of warfarin Therapy: indefinitely  INR Range:  2.0-3.0     INR 3.6 today  Primidone dose was increased which can cause INR to dec so dose was changed back to previous dose on 6/11 (7.5mg on Sun and Thurs only and 10mg all other days). Since still elevated, will take 5mg tonight then decrease dose to 7.5mg Sun, Tues, Thurs and 10mg all other days. Encouraged patient to remain consistent with vit k intake.   Recheck INR in 4 weeks on 8/5 (pt refused any earlier apt- said the earliest he could be here was Aug 5th)  He can only come on Mondays or

## 2019-07-25 RX ORDER — WARFARIN SODIUM 5 MG/1
TABLET ORAL
Qty: 150 TABLET | Refills: 2 | Status: SHIPPED | OUTPATIENT
Start: 2019-07-25 | End: 2020-09-23 | Stop reason: DRUGHIGH

## 2019-08-05 ENCOUNTER — HOSPITAL ENCOUNTER (OUTPATIENT)
Dept: NON INVASIVE DIAGNOSTICS | Age: 74
Discharge: HOME | End: 2019-08-05
Payer: MEDICARE

## 2019-08-05 ENCOUNTER — ANTI-COAG VISIT (OUTPATIENT)
Dept: PHARMACY | Age: 74
End: 2019-08-05
Payer: MEDICARE

## 2019-08-05 DIAGNOSIS — I48.91 ATRIAL FIBRILLATION, UNSPECIFIED TYPE (HCC): ICD-10-CM

## 2019-08-05 LAB
INTERNATIONAL NORMALIZATION RATIO, POC: 3.4
LV EF: 58 %
LVEF MODALITY: NORMAL

## 2019-08-05 PROCEDURE — 85610 PROTHROMBIN TIME: CPT

## 2019-08-05 PROCEDURE — 99211 OFF/OP EST MAY X REQ PHY/QHP: CPT

## 2019-08-05 PROCEDURE — 93306 TTE W/DOPPLER COMPLETE: CPT

## 2019-09-09 ENCOUNTER — TELEPHONE (OUTPATIENT)
Dept: PHARMACY | Age: 74
End: 2019-09-09

## 2019-09-09 ENCOUNTER — ANTI-COAG VISIT (OUTPATIENT)
Dept: PHARMACY | Age: 74
End: 2019-09-09
Payer: MEDICARE

## 2019-09-09 DIAGNOSIS — I48.91 ATRIAL FIBRILLATION, UNSPECIFIED TYPE (HCC): ICD-10-CM

## 2019-09-09 LAB — INTERNATIONAL NORMALIZATION RATIO, POC: 2.5

## 2019-09-09 PROCEDURE — 85610 PROTHROMBIN TIME: CPT

## 2019-09-09 PROCEDURE — 99211 OFF/OP EST MAY X REQ PHY/QHP: CPT

## 2019-10-07 ENCOUNTER — ANTI-COAG VISIT (OUTPATIENT)
Dept: PHARMACY | Age: 74
End: 2019-10-07
Payer: MEDICARE

## 2019-10-07 DIAGNOSIS — I48.91 ATRIAL FIBRILLATION, UNSPECIFIED TYPE (HCC): ICD-10-CM

## 2019-10-07 LAB — INTERNATIONAL NORMALIZATION RATIO, POC: 2.4

## 2019-10-07 PROCEDURE — 99211 OFF/OP EST MAY X REQ PHY/QHP: CPT

## 2019-10-07 PROCEDURE — 85610 PROTHROMBIN TIME: CPT

## 2019-11-11 ENCOUNTER — TELEPHONE (OUTPATIENT)
Dept: PHARMACY | Age: 74
End: 2019-11-11

## 2019-11-18 ENCOUNTER — TELEPHONE (OUTPATIENT)
Dept: PHARMACY | Age: 74
End: 2019-11-18

## 2019-11-18 ENCOUNTER — ANTI-COAG VISIT (OUTPATIENT)
Dept: PHARMACY | Age: 74
End: 2019-11-18
Payer: MEDICARE

## 2019-11-18 DIAGNOSIS — I48.91 ATRIAL FIBRILLATION, UNSPECIFIED TYPE (HCC): ICD-10-CM

## 2019-11-18 LAB — INTERNATIONAL NORMALIZATION RATIO, POC: 4.3

## 2019-11-18 PROCEDURE — 85610 PROTHROMBIN TIME: CPT

## 2019-11-18 PROCEDURE — 99212 OFFICE O/P EST SF 10 MIN: CPT

## 2019-11-27 ENCOUNTER — APPOINTMENT (OUTPATIENT)
Dept: CT IMAGING | Age: 74
End: 2019-11-27
Payer: MEDICARE

## 2019-11-27 ENCOUNTER — TELEPHONE (OUTPATIENT)
Dept: PHARMACY | Age: 74
End: 2019-11-27

## 2019-11-27 ENCOUNTER — HOSPITAL ENCOUNTER (EMERGENCY)
Age: 74
Discharge: HOME | End: 2019-11-27
Attending: EMERGENCY MEDICINE
Payer: MEDICARE

## 2019-11-27 VITALS
HEIGHT: 68 IN | DIASTOLIC BLOOD PRESSURE: 75 MMHG | RESPIRATION RATE: 20 BRPM | SYSTOLIC BLOOD PRESSURE: 139 MMHG | OXYGEN SATURATION: 97 % | BODY MASS INDEX: 34.71 KG/M2 | TEMPERATURE: 98.1 F | WEIGHT: 229 LBS | HEART RATE: 72 BPM

## 2019-11-27 DIAGNOSIS — S09.90XA CLOSED HEAD INJURY, INITIAL ENCOUNTER: ICD-10-CM

## 2019-11-27 DIAGNOSIS — R79.1 SUPRATHERAPEUTIC INR: Primary | ICD-10-CM

## 2019-11-27 DIAGNOSIS — S22.31XA CLOSED FRACTURE OF ONE RIB OF RIGHT SIDE, INITIAL ENCOUNTER: ICD-10-CM

## 2019-11-27 LAB
A/G RATIO: 1.3 (ref 1.1–2.2)
ALBUMIN SERPL-MCNC: 3.5 G/DL (ref 3.4–5)
ALP BLD-CCNC: 93 U/L (ref 40–129)
ALT SERPL-CCNC: 10 U/L (ref 10–40)
ANION GAP SERPL CALCULATED.3IONS-SCNC: 11 MMOL/L (ref 3–16)
AST SERPL-CCNC: 15 U/L (ref 15–37)
BASOPHILS ABSOLUTE: 0 K/UL (ref 0–0.2)
BASOPHILS RELATIVE PERCENT: 0.6 %
BILIRUB SERPL-MCNC: 0.3 MG/DL (ref 0–1)
BUN BLDV-MCNC: 12 MG/DL (ref 7–20)
CALCIUM SERPL-MCNC: 8.4 MG/DL (ref 8.3–10.6)
CHLORIDE BLD-SCNC: 102 MMOL/L (ref 99–110)
CO2: 25 MMOL/L (ref 21–32)
CREAT SERPL-MCNC: 0.5 MG/DL (ref 0.8–1.3)
EOSINOPHILS ABSOLUTE: 0.1 K/UL (ref 0–0.6)
EOSINOPHILS RELATIVE PERCENT: 2 %
GFR AFRICAN AMERICAN: >60
GFR NON-AFRICAN AMERICAN: >60
GLOBULIN: 2.6 G/DL
GLUCOSE BLD-MCNC: 103 MG/DL (ref 70–99)
HCT VFR BLD CALC: 39.9 % (ref 40.5–52.5)
HEMOGLOBIN: 13.1 G/DL (ref 13.5–17.5)
INR BLD: 5.16 (ref 0.86–1.14)
LYMPHOCYTES ABSOLUTE: 1.3 K/UL (ref 1–5.1)
LYMPHOCYTES RELATIVE PERCENT: 20.8 %
MCH RBC QN AUTO: 29.9 PG (ref 26–34)
MCHC RBC AUTO-ENTMCNC: 32.8 G/DL (ref 31–36)
MCV RBC AUTO: 91.3 FL (ref 80–100)
MONOCYTES ABSOLUTE: 0.6 K/UL (ref 0–1.3)
MONOCYTES RELATIVE PERCENT: 8.9 %
NEUTROPHILS ABSOLUTE: 4.4 K/UL (ref 1.7–7.7)
NEUTROPHILS RELATIVE PERCENT: 67.7 %
PDW BLD-RTO: 14.7 % (ref 12.4–15.4)
PLATELET # BLD: 254 K/UL (ref 135–450)
PMV BLD AUTO: 8.3 FL (ref 5–10.5)
POTASSIUM SERPL-SCNC: 3.5 MMOL/L (ref 3.5–5.1)
PROTHROMBIN TIME: 60.8 SEC (ref 10–13.2)
RBC # BLD: 4.37 M/UL (ref 4.2–5.9)
SODIUM BLD-SCNC: 138 MMOL/L (ref 136–145)
TOTAL PROTEIN: 6.1 G/DL (ref 6.4–8.2)
WBC # BLD: 6.4 K/UL (ref 4–11)

## 2019-11-27 PROCEDURE — 6360000004 HC RX CONTRAST MEDICATION: Performed by: NURSE PRACTITIONER

## 2019-11-27 PROCEDURE — 72125 CT NECK SPINE W/O DYE: CPT

## 2019-11-27 PROCEDURE — 80053 COMPREHEN METABOLIC PANEL: CPT

## 2019-11-27 PROCEDURE — 71260 CT THORAX DX C+: CPT

## 2019-11-27 PROCEDURE — 85610 PROTHROMBIN TIME: CPT

## 2019-11-27 PROCEDURE — 70450 CT HEAD/BRAIN W/O DYE: CPT

## 2019-11-27 PROCEDURE — 99284 EMERGENCY DEPT VISIT MOD MDM: CPT

## 2019-11-27 PROCEDURE — 74177 CT ABD & PELVIS W/CONTRAST: CPT

## 2019-11-27 PROCEDURE — 85025 COMPLETE CBC W/AUTO DIFF WBC: CPT

## 2019-11-27 RX ORDER — METHOCARBAMOL 750 MG/1
750 TABLET, FILM COATED ORAL 4 TIMES DAILY
Qty: 40 TABLET | Refills: 0 | Status: SHIPPED | OUTPATIENT
Start: 2019-11-27 | End: 2019-12-07

## 2019-11-27 RX ORDER — OXYCODONE HYDROCHLORIDE 5 MG/1
5 TABLET ORAL EVERY 6 HOURS PRN
Qty: 12 TABLET | Refills: 0 | Status: SHIPPED | OUTPATIENT
Start: 2019-11-27 | End: 2019-11-30

## 2019-11-27 RX ADMIN — IOPAMIDOL 75 ML: 755 INJECTION, SOLUTION INTRAVENOUS at 12:50

## 2019-11-27 ASSESSMENT — ENCOUNTER SYMPTOMS
SHORTNESS OF BREATH: 0
DIARRHEA: 0
ABDOMINAL PAIN: 0
CHEST TIGHTNESS: 0
VOMITING: 0
NAUSEA: 0

## 2019-11-27 ASSESSMENT — PAIN DESCRIPTION - LOCATION: LOCATION: RIB CAGE;HEAD

## 2019-12-02 ENCOUNTER — ANTI-COAG VISIT (OUTPATIENT)
Dept: PHARMACY | Age: 74
End: 2019-12-02
Payer: MEDICARE

## 2019-12-02 DIAGNOSIS — I48.91 ATRIAL FIBRILLATION, UNSPECIFIED TYPE (HCC): ICD-10-CM

## 2019-12-02 LAB — INTERNATIONAL NORMALIZATION RATIO, POC: 1.8

## 2019-12-02 PROCEDURE — 85610 PROTHROMBIN TIME: CPT

## 2019-12-02 PROCEDURE — 99212 OFFICE O/P EST SF 10 MIN: CPT

## 2019-12-09 ENCOUNTER — ANTI-COAG VISIT (OUTPATIENT)
Dept: PHARMACY | Age: 74
End: 2019-12-09
Payer: MEDICARE

## 2019-12-09 DIAGNOSIS — I48.91 ATRIAL FIBRILLATION, UNSPECIFIED TYPE (HCC): ICD-10-CM

## 2019-12-09 LAB — INTERNATIONAL NORMALIZATION RATIO, POC: 2.6

## 2019-12-09 PROCEDURE — 99211 OFF/OP EST MAY X REQ PHY/QHP: CPT

## 2019-12-09 PROCEDURE — 85610 PROTHROMBIN TIME: CPT

## 2020-01-06 ENCOUNTER — ANTI-COAG VISIT (OUTPATIENT)
Dept: PHARMACY | Age: 75
End: 2020-01-06
Payer: MEDICARE

## 2020-01-06 LAB — INTERNATIONAL NORMALIZATION RATIO, POC: 2.7

## 2020-01-06 PROCEDURE — 85610 PROTHROMBIN TIME: CPT

## 2020-01-06 PROCEDURE — 99211 OFF/OP EST MAY X REQ PHY/QHP: CPT

## 2020-01-06 RX ORDER — WARFARIN SODIUM 5 MG/1
7.5 TABLET ORAL DAILY
Qty: 135 TABLET | Refills: 2 | Status: SHIPPED | OUTPATIENT
Start: 2020-01-06 | End: 2021-10-04

## 2020-01-06 NOTE — PROGRESS NOTES
Mr. Sanju Figueroa is a 75 y.o. y/o male with history of Afib   He presents today for anticoagulation monitoring and adjustment. Pertinent PMH: COPD, CHF, HTN  Patient has his lap band adjusted occasioanly which will continue to affect his diet. Patient Reported Findings:  Yes     No  [x]   []       Patient verifies current dosing regimen as listed- patient cuts out calendar and puts it on top of his pill box and follows day by day- says he follows the AVS     []   [x]       S/S bleeding/bruising/swelling/SOB  []   [x]       Blood in urine or stool  []   [x]       Procedures scheduled in the future at this time   []   [x]       Missed dose   []   [x]       Extra dose  []   [x]       Change in medications -was on pain meds, done now--> no changes   []   [x]       Change in health/diet/appetite He has had  green beans 2 x last week and has had broccoli 4 times a week which is an increase. He will also have mixed vegetables depending on other food available on the menu. (tani slaw, green beans, corn). He tries to keep Vit K intake consistent, but sometimes has trouble. --->no changes   []   [x]       Change in alcohol use  []   [x]       Change in activity  []   [x]       Hospital admission  []   [x]       Emergency department visit  []   [x]       Other complaints   []   [x]       Tobacco use  Stopped smoking as of Wed 11/28/19. Former smoked 2 1/2 ppd. Clinical Outcomes:  Yes     No  []   [x]       Major bleeding event  []   [x]       Thromboembolic event  Uses a pillbox  Duration of warfarin Therapy: indefinitely  INR Range:  2.0-3.0     INR 2.7 today   Continue taking 7.5mg daily   Encouraged patient to remain consistent with vit k intake. [facility]ill sent to [facility].    Recheck INR in 4 weeks, 2/3     **Patient does NOT want a yellow highlighter**     Referring cardiologist: Dr. Loli Andersen  INR (no units)   Date Value   01/06/2020 2.7   12/09/2019 2.6   12/02/2019 1.8   11/27/2019 5.16 ([place])   11/18/2019

## 2020-01-29 NOTE — PROGRESS NOTES
[address]   Electrophysiology Follow Up  Date: 2/3/2020    CC: Afib  HPI: Joseluis Wilde is a 75 y.o. male with a PMH of Afib/flutter, CHF, COPD, HTN, and ASHELY on bipap. He underwent RFA 8/2011 and  CryoAblation 9/2011. On 9/3/15 RFA of atrial fibrillation and pulmonary veins re-isolation was performed. Since then he has remained in sinus rhythm. Denies having any palpitation. No chest pain. He was morbidly obese and lost significant weight after weight loss surgery. States that he has gained some weight back. Past Medical History:   Diagnosis Date    Alcoholism [facility])     sober since 2008    Allergic 1959    Seasonal Hay Fever    Anxiety     Asthma     Atrial fibrillation ([address]     cardioversion 8/4/11, 7/1/11, 2/16/11, 9/23/10    Atrial flutter ([address]     status post ablation    Bipolar 1 disorder ([address]     Cataract     2009: [facility] Eyes[facility]    CHF (congestive heart failure) (HCC)     Chronic insomnia     Colon polyps     on colonoscopy    COPD (chronic obstructive pulmonary disease) (HCC)     Depression     Elevated PSA 3/14    [place]    Gallstones     GERD (gastroesophageal reflux disease)     Hiatal hernia     small    Hidradenitis suppurativa     Hypertension     Insomnia     Microcytic anemia 10/14/2010    Morbid obesity (BMI 40.0 or higher)     Obstructive sleep apnea     on bipap 17/11resolved 3 years ago    Seizures ([address]     1973-One occurance. Unknown etiology. On Dilantin 1 year.         Past Surgical History:   Procedure Laterality Date    ABDOMEN SURGERY      gastric band    [place]  2001    [place]    ATRIAL ABLATION SURGERY  8/5/11    atrial flutter ablation    ATRIAL ABLATION SURGERY  9/1/11    cryoablation for atrial fibrillation    ATRIAL ABLATION SURGERY  9/3/15    RFCA for AF with PVI    BARIATRIC SURGERY  8/5/13    band over bypass    CARDIOVERSION  x4    CATARACT REMOVAL Bilateral 2009 of    myocardial ischemia or scar.    Normal LV function.    Overall findings represent a low risk scan.             Medication:  Prior to Admission medications    Medication Sig Start Date End Date Taking? Authorizing Provider   hydrocortisone 2.5 % cream APPLY TO RED AREAS ON THE FACE TWICE DAILY FOR FEW DAYS IF NEEDED 1/10/20  Yes Corey Snell MD   warfarin (COUMADIN) 5 MG tablet Take 1.5 tablets by mouth daily Take 7.5mg daily. 1/6/20 4/5/20 Yes Isacc Arnold MD   warfarin (COUMADIN) 5 MG tablet Take 1.5 tablets by mouth three times a week AND 2 tablets four times a week. Take 7.5mg on Sun, Tues and Thurs and 10mg all other days.  7/25/19 2/3/20 Yes Isacc Arnold MD   FLUoxetine (PROZAC) 40 MG capsule Take 1 capsule by mouth daily 3/18/19  Yes Historical Provider, MD   ADVAIR DISKUS 250-50 MCG/DOSE AEPB Inhale 1 puff into the lungs 2 times daily 2/18/19  Yes Historical Provider, MD   montelukast (SINGULAIR) 10 MG tablet Take 1 tablet by mouth nightly 1/21/19  Yes Historical Provider, MD   vitamin D (ERGOCALCIFEROL) 20328 units CAPS capsule Take 1 capsule by mouth daily 12/10/18  Yes Historical Provider, MD   ferrous sulfate 325 (65 Fe) MG tablet Take 325 mg by mouth once a week 3 times weekly   Yes Historical Provider, MD   traZODone (DESYREL) 50 MG tablet Take 150 mg by mouth nightly    Yes Historical Provider, MD   primidone (MYSOLINE) 250 MG tablet Take 750 mg by mouth nightly    Yes Historical Provider, MD   QUEtiapine (SEROQUEL) 400 MG tablet Take 800 mg by mouth nightly   Yes Historical Provider, MD   vitamin B-12 (CYANOCOBALAMIN) 1000 MCG tablet Take 1,000 mcg by mouth daily   Yes Historical Provider, MD   atorvastatin (LIPITOR) 20 MG tablet Take 1 tablet by mouth daily 6/20/16  Yes Historical Provider, MD   b complex vitamins capsule Take 1 capsule by mouth daily   Yes Historical Provider, MD   zinc 50 MG CAPS Take by mouth   Yes Historical Provider, MD   Melatonin 10 MG TABS Take 20 mg by mouth nightly

## 2020-02-03 ENCOUNTER — OFFICE VISIT (OUTPATIENT)
Dept: CARDIOLOGY CLINIC | Age: 75
End: 2020-02-03
Payer: MEDICARE

## 2020-02-03 ENCOUNTER — ANTI-COAG VISIT (OUTPATIENT)
Dept: PHARMACY | Age: 75
End: 2020-02-03
Payer: MEDICARE

## 2020-02-03 VITALS
HEIGHT: 68 IN | RESPIRATION RATE: 18 BRPM | DIASTOLIC BLOOD PRESSURE: 68 MMHG | BODY MASS INDEX: 34.1 KG/M2 | HEART RATE: 70 BPM | SYSTOLIC BLOOD PRESSURE: 102 MMHG | WEIGHT: 225 LBS

## 2020-02-03 LAB — INTERNATIONAL NORMALIZATION RATIO, POC: 2.5

## 2020-02-03 PROCEDURE — 1036F TOBACCO NON-USER: CPT | Performed by: INTERNAL MEDICINE

## 2020-02-03 PROCEDURE — G8427 DOCREV CUR MEDS BY ELIG CLIN: HCPCS | Performed by: INTERNAL MEDICINE

## 2020-02-03 PROCEDURE — G8417 CALC BMI ABV UP PARAM F/U: HCPCS | Performed by: INTERNAL MEDICINE

## 2020-02-03 PROCEDURE — 93000 ELECTROCARDIOGRAM COMPLETE: CPT | Performed by: INTERNAL MEDICINE

## 2020-02-03 PROCEDURE — 99211 OFF/OP EST MAY X REQ PHY/QHP: CPT

## 2020-02-03 PROCEDURE — 3017F COLORECTAL CA SCREEN DOC REV: CPT | Performed by: INTERNAL MEDICINE

## 2020-02-03 PROCEDURE — 99214 OFFICE O/P EST MOD 30 MIN: CPT | Performed by: INTERNAL MEDICINE

## 2020-02-03 PROCEDURE — 4040F PNEUMOC VAC/ADMIN/RCVD: CPT | Performed by: INTERNAL MEDICINE

## 2020-02-03 PROCEDURE — G8484 FLU IMMUNIZE NO ADMIN: HCPCS | Performed by: INTERNAL MEDICINE

## 2020-02-03 PROCEDURE — 1123F ACP DISCUSS/DSCN MKR DOCD: CPT | Performed by: INTERNAL MEDICINE

## 2020-02-03 PROCEDURE — 85610 PROTHROMBIN TIME: CPT

## 2020-03-02 ENCOUNTER — ANTI-COAG VISIT (OUTPATIENT)
Dept: PHARMACY | Age: 75
End: 2020-03-02
Payer: MEDICARE

## 2020-03-02 LAB — INTERNATIONAL NORMALIZATION RATIO, POC: 3.3

## 2020-03-02 PROCEDURE — 99211 OFF/OP EST MAY X REQ PHY/QHP: CPT

## 2020-03-02 PROCEDURE — 85610 PROTHROMBIN TIME: CPT

## 2020-03-02 NOTE — PROGRESS NOTES
Mr. Kimberlee Tesfaye is a 75 y.o. y/o male with history of Afib   He presents today for anticoagulation monitoring and adjustment. Pertinent PMH: COPD, CHF, HTN  Patient has his lap band adjusted occasioanly which will continue to affect his diet. Patient Reported Findings:  Yes     No  [x]   []       Patient verifies current dosing regimen as listed- patient cuts out calendar and puts it on top of his pill box and follows day by day- says he follows the AVS     []   [x]       S/S bleeding/bruising/swelling/SOB- denies   []   [x]       Blood in urine or stool- denies   []   [x]       Procedures scheduled in the future at this time   []   [x]       Missed dose   []   [x]       Extra dose- denies   []   [x]       Change in medications -was on pain meds, done now--> no changes---> more tylenol recently    []   [x]       Change in health/diet/appetite He has had  green beans 2 x last week and has had broccoli 4 times a week which is an increase. He will also have mixed vegetables depending on other food available on the menu. (tani slaw, green beans, corn). He tries to keep Vit K intake consistent, but sometimes has trouble. --->no changes, no NVD   []   [x]       Change in alcohol use- denies   []   [x]       Change in activity  []   [x]       Hospital admission  []   [x]       Emergency department visit  []   [x]       Other complaints   []   [x]       Tobacco use  Stopped smoking as of Wed 11/28/19. Former smoked 2 1/2 ppd. Clinical Outcomes:  Yes     No  []   [x]       Major bleeding event  []   [x]       Thromboembolic event  Uses a pillbox  Duration of warfarin Therapy: indefinitely  INR Range:  2.0-3.0     INR 3.3 today dt more tylenol recently. Hold tonight then continue taking 7.5mg daily. Encouraged patient to remain consistent with vit k intake.   Recheck INR in 5 weeks, 4/6- pt cannot make March work as he states he is very busy this month, reviewed risks of INR being elevated and he will call

## 2020-03-25 ENCOUNTER — TELEPHONE (OUTPATIENT)
Dept: PHARMACY | Age: 75
End: 2020-03-25

## 2020-04-20 NOTE — TELEPHONE ENCOUNTER
Patient called to say he is interested in [employer] program however, wanted an appt for Thursday. Explained that we only had openings, at this time, for today between 10-12noon. Patient requests a call back if the  program is expanded to later this week.

## 2020-04-21 NOTE — TELEPHONE ENCOUNTER
Patient called back. He cannot get transportation tomorrow. He rescheduled for Monday @ 12 noon. He has a telemedicine visit @ 10:40a and he wants to have enough time to complete this b/4 leaving to come to [facility].

## 2020-04-21 NOTE — TELEPHONE ENCOUNTER
Called & left  for patient to let him know we will have the Drive Thru INR clinic available on Wed. 4/22 & Mon. 4/27. Asked him to call us back if he was interested in scheduling an appt either day.

## 2020-04-22 ENCOUNTER — TELEPHONE (OUTPATIENT)
Dept: DERMATOLOGY | Age: 75
End: 2020-04-22

## 2020-04-23 ENCOUNTER — TELEPHONE (OUTPATIENT)
Dept: DERMATOLOGY | Age: 75
End: 2020-04-23

## 2020-04-23 NOTE — TELEPHONE ENCOUNTER
Dr. Tonny Vance patient    Patient left voice mail on 4/23 returning your call. Didn't give reason for the call.     Call back # [phone]

## 2020-04-24 RX ORDER — TRIAMCINOLONE ACETONIDE 1 MG/G
CREAM TOPICAL
Qty: 80 G | Refills: 0 | Status: SHIPPED | OUTPATIENT
Start: 2020-04-24 | End: 2020-05-12

## 2020-04-24 NOTE — TELEPHONE ENCOUNTER
Patient notified rx sent in for his legs. Patient advised me that he has a little area at the corner of his mouth x 2 days. Patient will send a picture of that area.

## 2020-04-27 ENCOUNTER — ANTI-COAG VISIT (OUTPATIENT)
Dept: PHARMACY | Age: 75
End: 2020-04-27
Payer: MEDICARE

## 2020-04-27 LAB — INTERNATIONAL NORMALIZATION RATIO, POC: 1.9

## 2020-04-27 PROCEDURE — 99211 OFF/OP EST MAY X REQ PHY/QHP: CPT

## 2020-04-27 PROCEDURE — 85610 PROTHROMBIN TIME: CPT

## 2020-04-27 NOTE — PROGRESS NOTES
Mr. Tita Keenan is a 75 y.o. y/o male with history of Afib   He presents today for anticoagulation monitoring and adjustment. Pertinent PMH: COPD, CHF, HTN  Patient has his lap band adjusted occasioanly which will continue to affect his diet. Patient Reported Findings:  Yes     No  [x]   []       Patient verifies current dosing regimen as listed- patient cuts out calendar and puts it on top of his pill box and follows day by day- says he follows the AVS     []   [x]       S/S bleeding/bruising/swelling/SOB- denies   []   [x]       Blood in urine or stool- denies   []   [x]       Procedures scheduled in the future at this time   []   [x]       Missed dose denies    []   [x]       Extra dose- denies   [x]   []       Change in medications -was on pain meds, done now--> more tylenol recently --> states he took one tylenol a few days ago     []   [x]       Change in health/diet/appetite He has had  green beans 2 x last week and has had broccoli 4 times a week which is an increase. He will also have mixed vegetables depending on other food available on the menu. (tani slaw, green beans, corn). He tries to keep Vit K intake consistent, but sometimes has trouble. --->no changes, no NVD   []   [x]       Change in alcohol use- denies   []   [x]       Change in activity  []   [x]       Hospital admission  []   [x]       Emergency department visit  []   [x]       Other complaints   []   [x]       Tobacco use  Stopped smoking as of Wed 11/28/19. Former smoked 2 1/2 ppd. Clinical Outcomes:  Yes     No  []   [x]       Major bleeding event  []   [x]       Thromboembolic event  Uses a pillbox  Duration of warfarin Therapy: indefinitely  INR Range:  2.0-3.0     INR 1.9 today   Spoke with patient via telephone as part of drive thru INR program     Continue taking 7.5mg daily. Encouraged patient to remain consistent with vit k intake.   Recheck INR in 4 weeks, 5/26    **Patient does NOT want a yellow highlighter**

## 2020-04-29 ENCOUNTER — TELEPHONE (OUTPATIENT)
Dept: DERMATOLOGY | Age: 75
End: 2020-04-29

## 2020-04-29 NOTE — TELEPHONE ENCOUNTER
E-mail received regarding the following:  Running low on my cream. Some progress noted on some areas. No refills on that prescription. Would you renew it or change to something else? Spoke to patient-he has so many areas on his body-legs, talha colored area on stomach, right shoulder and back of neck, that he is using triamcinolone on. Triamcinolone filled on 4/24/2020 and the tube is gone. Patient to send pictures of additional areas that he using the triamcinolone on.

## 2020-05-12 RX ORDER — TRIAMCINOLONE ACETONIDE 1 MG/G
CREAM TOPICAL
Qty: 80 G | Refills: 0 | Status: SHIPPED | OUTPATIENT
Start: 2020-05-12 | End: 2021-04-20 | Stop reason: ALTCHOICE

## 2020-05-21 NOTE — TELEPHONE ENCOUNTER
Patient calling and is adamant that he needs an in office visit.  And per his [address]  She thinks its an auto immune issues

## 2020-05-26 ENCOUNTER — OFFICE VISIT (OUTPATIENT)
Dept: DERMATOLOGY | Age: 75
End: 2020-05-26
Payer: MEDICARE

## 2020-05-26 PROCEDURE — 4040F PNEUMOC VAC/ADMIN/RCVD: CPT | Performed by: DERMATOLOGY

## 2020-05-26 PROCEDURE — G8417 CALC BMI ABV UP PARAM F/U: HCPCS | Performed by: DERMATOLOGY

## 2020-05-26 PROCEDURE — 1123F ACP DISCUSS/DSCN MKR DOCD: CPT | Performed by: DERMATOLOGY

## 2020-05-26 PROCEDURE — 1036F TOBACCO NON-USER: CPT | Performed by: DERMATOLOGY

## 2020-05-26 PROCEDURE — 99213 OFFICE O/P EST LOW 20 MIN: CPT | Performed by: DERMATOLOGY

## 2020-05-26 PROCEDURE — G8427 DOCREV CUR MEDS BY ELIG CLIN: HCPCS | Performed by: DERMATOLOGY

## 2020-05-26 PROCEDURE — 3017F COLORECTAL CA SCREEN DOC REV: CPT | Performed by: DERMATOLOGY

## 2020-05-26 RX ORDER — NYSTATIN 100000 U/G
CREAM TOPICAL
Qty: 15 G | Refills: 1 | Status: SHIPPED | OUTPATIENT
Start: 2020-05-26 | End: 2020-09-23 | Stop reason: ALTCHOICE

## 2020-05-26 RX ORDER — TRIAMCINOLONE ACETONIDE 1 MG/G
CREAM TOPICAL
Qty: 450 G | Refills: 0 | Status: SHIPPED | OUTPATIENT
Start: 2020-05-26 | End: 2020-06-30

## 2020-05-26 NOTE — PROGRESS NOTES
BARIATRIC SURGERY  8/5/13    band over bypass    CARDIOVERSION  x4    CATARACT REMOVAL Bilateral 2009    [facility], LAPAROSCOPIC N/A 06/20/2016    LAPAROSCOPIC CHOLECYSTECTOMY WITH CHOLANGIOGRAM    COLONOSCOPY  10/1/09, 10/1/12    [place], repeat 3 years    CYST REMOVAL      from chest    EYE SURGERY      Cataract removal - both eyes - performed at [facility].  HEMORRHOID SURGERY  3/23/16    HERNIA REPAIR      Repaired in conjunction with Band Over Bypass, 2013.  OTHER SURGICAL HISTORY  9/21/15    excision vivian rectal cyst    DIMITRIS-EN-Y GASTRIC BYPASS  2001    [place]    SMALL INTESTINE SURGERY      As part of R-N-Y procedure    UPPER GASTROINTESTINAL ENDOSCOPY  5/13/13    [place]       No Known Allergies  Outpatient Medications Marked as Taking for the 5/26/20 encounter (Office Visit) with Kirsty Brownlee MD   Medication Sig Dispense Refill    triamcinolone (KENALOG) 0.1 % cream Apply to affected areas of the skin twice daily for up to 2 weeks or until improved. 450 g 0    nystatin (MYCOSTATIN) 192466 UNIT/GM cream Apply to the corners of the mouth twice daily until improved.  15 g 1    triamcinolone (KENALOG) 0.1 % cream APPLY TO THE RASH ON THE LEGS TWICE DAILY FOR UP TO 2 WEEKS OR UNTIL IMPROVED 80 g 0    hydrocortisone 2.5 % cream APPLY TOPICALLY TO RED AREAS ON FACE TWICE DAILY FOR A FEW DAYS AS NEEDED 30 g 2    FLUoxetine (PROZAC) 40 MG capsule Take 1 capsule by mouth daily      ADVAIR DISKUS 250-50 MCG/DOSE AEPB Inhale 1 puff into the lungs 2 times daily  0    montelukast (SINGULAIR) 10 MG tablet Take 1 tablet by mouth nightly      vitamin D (ERGOCALCIFEROL) 85614 units CAPS capsule Take 1 capsule by mouth daily      ferrous sulfate 325 (65 Fe) MG tablet Take 325 mg by mouth once a week 3 times weekly      traZODone (DESYREL) 50 MG tablet Take 150 mg by mouth nightly       primidone (MYSOLINE) 250 MG tablet Take 750 mg by mouth nightly       QUEtiapine (SEROQUEL) 400 MG tablet Take 800 mg by mouth nightly      vitamin B-12 (CYANOCOBALAMIN) 1000 MCG tablet Take 1,000 mcg by mouth daily      atorvastatin (LIPITOR) 20 MG tablet Take 1 tablet by mouth daily      b complex vitamins capsule Take 1 capsule by mouth daily      zinc 50 MG CAPS Take by mouth      Melatonin 10 MG TABS Take 20 mg by mouth nightly           Physical Examination       The following were examined and determined to be normal: Psych/Neuro, Scalp/hair, Conjunctivae/eyelids, Gums/teeth/lips and Neck. The following were examined and determined to be abnormal: Head/face, Back, RUE, LUE, RLE and LLE. Well appearing. 1.  Back and extremities with multiple round crusted erythematous papules and plaques. 2.  Angles of the mouth brightly erythematous patches few erythematous papules. Assessment and Plan     1. Nummular dermatitis - moderately extensive    Triamcinolone 0.1% cream twice daily for up to 2 weeks or until improved. We discussed dry skin care measures including: use of a mild soap (like Dove, Olay or Cetaphil) and limiting use to intertriginous regions; addition of a moisturizer (preferrably cream based) to the trunk and extremities immediately after showering. 2. Angular cheilitis     Nystatin cream twice daily until improved.

## 2020-06-02 ENCOUNTER — ANTI-COAG VISIT (OUTPATIENT)
Dept: PHARMACY | Age: 75
End: 2020-06-02
Payer: MEDICARE

## 2020-06-02 VITALS — TEMPERATURE: 97.3 F

## 2020-06-02 LAB — INTERNATIONAL NORMALIZATION RATIO, POC: 3.3

## 2020-06-02 PROCEDURE — 99212 OFFICE O/P EST SF 10 MIN: CPT

## 2020-06-02 PROCEDURE — 85610 PROTHROMBIN TIME: CPT

## 2020-06-02 NOTE — PROGRESS NOTES
highlighter**     Referring cardiologist: Dr. Leyla Castro  INR (no units)   Date Value   06/02/2020 3.3   04/27/2020 1.9   03/02/2020 3.3   02/03/2020 2.5   11/27/2019 5.16 ([facility])   09/10/2018 1.7   08/20/2018 1.3   08/03/2018 1.4       CLINICAL PHARMACY CONSULT: MED RECONCILIATION/REVIEW ADDENDUM    For Pharmacy Admin Tracking Only    PHSO: No  Total # of Interventions Recommended: 0  - Maintenance Safety Lab Monitoring #: 1  Total Interventions Accepted: 0  Time Spent (min): 15    Rubén Diamond, BolaD

## 2020-06-18 ENCOUNTER — OFFICE VISIT (OUTPATIENT)
Dept: DERMATOLOGY | Age: 75
End: 2020-06-18
Payer: MEDICARE

## 2020-06-18 VITALS — TEMPERATURE: 97.7 F

## 2020-06-18 PROCEDURE — 1036F TOBACCO NON-USER: CPT | Performed by: DERMATOLOGY

## 2020-06-18 PROCEDURE — 3017F COLORECTAL CA SCREEN DOC REV: CPT | Performed by: DERMATOLOGY

## 2020-06-18 PROCEDURE — 99213 OFFICE O/P EST LOW 20 MIN: CPT | Performed by: DERMATOLOGY

## 2020-06-18 PROCEDURE — G8427 DOCREV CUR MEDS BY ELIG CLIN: HCPCS | Performed by: DERMATOLOGY

## 2020-06-18 PROCEDURE — 4040F PNEUMOC VAC/ADMIN/RCVD: CPT | Performed by: DERMATOLOGY

## 2020-06-18 PROCEDURE — 1123F ACP DISCUSS/DSCN MKR DOCD: CPT | Performed by: DERMATOLOGY

## 2020-06-18 PROCEDURE — G8417 CALC BMI ABV UP PARAM F/U: HCPCS | Performed by: DERMATOLOGY

## 2020-06-18 PROCEDURE — 96372 THER/PROPH/DIAG INJ SC/IM: CPT | Performed by: DERMATOLOGY

## 2020-06-18 RX ORDER — TRIAMCINOLONE ACETONIDE 40 MG/ML
80 INJECTION, SUSPENSION INTRA-ARTICULAR; INTRAMUSCULAR ONCE
Status: COMPLETED | OUTPATIENT
Start: 2020-06-18 | End: 2020-06-18

## 2020-06-18 RX ADMIN — TRIAMCINOLONE ACETONIDE 80 MG: 40 INJECTION, SUSPENSION INTRA-ARTICULAR; INTRAMUSCULAR at 10:08

## 2020-06-18 NOTE — PROGRESS NOTES
[facility]  Jeri Lang MD  [address] [place] [address] CELESTINE Flanagan  [birth year]    75 y.o. male     Date of Visit: 6/18/2020    Chief Complaint: rash follow up    History of Present Illness:    Last seen about 3 weeks:     1. F/u for moderately extensive nummular dermatitis - some lesions are fading with use of triamcinolone 0.1% cream but still complains of itching. The itching is most disturbing at night. On Warfarin. Review of Systems:  Skin: No other concerning skin lesions or growths. Past Medical History, Family History, Surgical History, Medications and Allergies reviewed. Past Medical History:   Diagnosis Date    Alcoholism [facility])     sober since 2008    Allergic 1959    Seasonal Hay Fever    Anxiety     Asthma     Atrial fibrillation ([address]     cardioversion 8/4/11, 7/1/11, 2/16/11, 9/23/10    Atrial flutter ([address]     status post ablation    Bipolar 1 disorder ([address]     Cataract     2009: Cataract Surgery (Both Eyes[facility] at [facility]    CHF (congestive heart failure) (HCC)     Chronic insomnia     Colon polyps     on colonoscopy    COPD (chronic obstructive pulmonary disease) (HCC)     Depression     Elevated PSA 3/14    Alexander    Gallstones     GERD (gastroesophageal reflux disease)     Hiatal hernia     small    Hidradenitis suppurativa     Hypertension     Insomnia     Microcytic anemia 10/14/2010    Morbid obesity (BMI 40.0 or higher)     Obstructive sleep apnea     on bipap 17/11resolved 3 years ago    Seizures ([address]     1973-One occurance. Unknown etiology. On Dilantin 1 year.      Past Surgical History:   Procedure Laterality Date    ABDOMEN SURGERY      gastric band    [place]    ATRIAL ABLATION SURGERY  8/5/11    atrial flutter ablation    ATRIAL ABLATION SURGERY  9/1/11    cryoablation for atrial fibrillation    ATRIAL ABLATION SURGERY  9/3/15    RFCA for AF with PVI    BARIATRIC SURGERY  8/5/13    band over bypass    CARDIOVERSION  x4    CATARACT REMOVAL Bilateral 2009    [facility], LAPAROSCOPIC N/A 06/20/2016    LAPAROSCOPIC CHOLECYSTECTOMY WITH CHOLANGIOGRAM    COLONOSCOPY  10/1/09, 10/1/12    [place], repeat 3 years    CYST REMOVAL      from chest    EYE SURGERY      Cataract removal - both eyes - [facility] at [facility].  HEMORRHOID SURGERY  3/23/16    HERNIA REPAIR      Repaired in conjunction with Band Over Bypass, 2013.  OTHER SURGICAL HISTORY  9/21/15    excision vivian rectal cyst    DIMITRIS-EN-Y GASTRIC BYPASS  2001    [place]    SMALL INTESTINE SURGERY      As part of R-N-Y procedure    UPPER GASTROINTESTINAL ENDOSCOPY  5/13/13    [place]       No Known Allergies  Outpatient Medications Marked as Taking for the 6/18/20 encounter (Office Visit) with Rachna Lorenzana MD   Medication Sig Dispense Refill    triamcinolone (KENALOG) 0.1 % cream Apply to affected areas of the skin twice daily for up to 2 weeks or until improved. 450 g 0    nystatin (MYCOSTATIN) 764915 UNIT/GM cream Apply to the corners of the mouth twice daily until improved.  15 g 1    triamcinolone (KENALOG) 0.1 % cream APPLY TO THE RASH ON THE LEGS TWICE DAILY FOR UP TO 2 WEEKS OR UNTIL IMPROVED 80 g 0    hydrocortisone 2.5 % cream APPLY TOPICALLY TO RED AREAS ON FACE TWICE DAILY FOR A FEW DAYS AS NEEDED 30 g 2    FLUoxetine (PROZAC) 40 MG capsule Take 1 capsule by mouth daily      ADVAIR DISKUS 250-50 MCG/DOSE AEPB Inhale 1 puff into the lungs 2 times daily  0    montelukast (SINGULAIR) 10 MG tablet Take 1 tablet by mouth nightly      vitamin D (ERGOCALCIFEROL) 04154 units CAPS capsule Take 1 capsule by mouth daily      ferrous sulfate 325 (65 Fe) MG tablet Take 325 mg by mouth once a week 3 times weekly      traZODone (DESYREL) 50 MG tablet Take 150 mg by mouth nightly       primidone (MYSOLINE) 250 MG tablet Take 750 mg by mouth nightly       QUEtiapine (SEROQUEL)

## 2020-06-30 RX ORDER — TRIAMCINOLONE ACETONIDE 1 MG/G
CREAM TOPICAL
Qty: 454 G | Refills: 0 | Status: SHIPPED | OUTPATIENT
Start: 2020-06-30 | End: 2020-08-13 | Stop reason: SDUPTHER

## 2020-07-07 ENCOUNTER — ANTI-COAG VISIT (OUTPATIENT)
Dept: PHARMACY | Age: 75
End: 2020-07-07
Payer: MEDICARE

## 2020-07-07 VITALS — TEMPERATURE: 98.1 F

## 2020-07-07 LAB — INTERNATIONAL NORMALIZATION RATIO, POC: 3.2

## 2020-07-07 PROCEDURE — 99212 OFFICE O/P EST SF 10 MIN: CPT

## 2020-07-07 PROCEDURE — 85610 PROTHROMBIN TIME: CPT

## 2020-07-07 NOTE — PROGRESS NOTES
Mr. Patsy Hussein is a 75 y.o. y/o male with history of Afib   He presents today for anticoagulation monitoring and adjustment. Pertinent PMH: COPD, CHF, HTN  Patient has his lap band adjusted occasioanly which will continue to affect his diet. Patient Reported Findings:  Yes     No  [x]   []       Patient verifies current dosing regimen as listed- patient cuts out calendar and puts it on top of his pill box and follows day by day- says he follows the AVS     []   [x]       S/S bleeding/bruising/swelling/SOB- denies   []   [x]       Blood in urine or stool- denies   [x]   []       Procedures scheduled in the future at this time has colonoscopy scheduled for 7/31. Authorized to stop warfarin 7/26. Was told he will resume 8/1.   []   [x]       Missed dose denies    []   [x]       Extra dose- denies   [x]   []       Change in medications -was on pain meds, done now--> more tylenol recently --> is having iron infusions   [x]   []       Change in health/diet/appetite He has had  green beans 2 x last week and has had broccoli 4 times a week which is an increase. He will also have mixed vegetables depending on other food available on the menu. (tani slaw, green beans, corn). He tries to keep Vit K intake consistent, but sometimes has trouble. --->has had less greens than normal ---> no vit k    []   [x]       Change in alcohol use- denies   []   [x]       Change in activity  []   [x]       Hospital admission  []   [x]       Emergency department visit  []   [x]       Other complaints   []   [x]       Tobacco use  Stopped smoking as of Wed 11/28/19. Former smoked 2 1/2 ppd. Clinical Outcomes:  Yes     No  []   [x]       Major bleeding event  []   [x]       Thromboembolic event  Uses a pillbox  Duration of warfarin Therapy: indefinitely  INR Range:  2.0-3.0     INR 3.2 today     Take 5 mg tonight then continue taking 7.5mg daily. Hold warfarin 7/26-8/1.  Resume warfarin taking 10 mg then return to normal weekly dose of warfarin. Encouraged patient to remain consistent with vit k intake.   Recheck INR in 5 weeks, 8/11, 10 days after procedure, as pt can only come tues and thurs     **Patient does NOT want a yellow highlighter**     Referring cardiologist: Dr. Chelsey Lockwood  INR (no units)   Date Value   07/07/2020 3.2   06/02/2020 3.3   04/27/2020 1.9   03/02/2020 3.3   11/27/2019 5.16 ([place])   09/10/2018 1.7   08/20/2018 1.3   08/03/2018 1.4       CLINICAL PHARMACY CONSULT: MED RECONCILIATION/REVIEW ADDENDUM    For Pharmacy Admin Tracking Only    PHSO: No  Total # of Interventions Recommended: 1  - Decreased Dose #: 1  - Maintenance Safety Lab Monitoring #: 1  Total Interventions Accepted: 1  Time Spent (min): 15    Aron Felix, PharmD

## 2020-07-16 ENCOUNTER — OFFICE VISIT (OUTPATIENT)
Dept: DERMATOLOGY | Age: 75
End: 2020-07-16
Payer: MEDICARE

## 2020-07-16 VITALS — TEMPERATURE: 97.2 F

## 2020-07-16 PROCEDURE — 3017F COLORECTAL CA SCREEN DOC REV: CPT | Performed by: DERMATOLOGY

## 2020-07-16 PROCEDURE — 99213 OFFICE O/P EST LOW 20 MIN: CPT | Performed by: DERMATOLOGY

## 2020-07-16 PROCEDURE — 1036F TOBACCO NON-USER: CPT | Performed by: DERMATOLOGY

## 2020-07-16 PROCEDURE — 4040F PNEUMOC VAC/ADMIN/RCVD: CPT | Performed by: DERMATOLOGY

## 2020-07-16 PROCEDURE — G8427 DOCREV CUR MEDS BY ELIG CLIN: HCPCS | Performed by: DERMATOLOGY

## 2020-07-16 PROCEDURE — 1123F ACP DISCUSS/DSCN MKR DOCD: CPT | Performed by: DERMATOLOGY

## 2020-07-16 PROCEDURE — G8417 CALC BMI ABV UP PARAM F/U: HCPCS | Performed by: DERMATOLOGY

## 2020-07-16 NOTE — PROGRESS NOTES
[facility]  Cuca Felton MD  [phone]      Savanah Flanagan  [birth year]    75 y.o. male     Date of Visit: 7/16/2020    Chief Complaint: dermatitis    History of Present Illness:    1. He returns today to follow-up from extensive nummular dermatitis. At last visit he received intramuscular Kenalog 80 mg has been using triamcinolone 0.1% cream with marked  improvement. He still complains of some itching on the neck and back. 2.  He complains of persistent lesions on the left hand. 3.  He also complains of few growths on the upper extremities. Review of Systems:  Skin: No new or changing moles. Past Medical History, Family History, Surgical History, Medications and Allergies reviewed. Past Medical History:   Diagnosis Date    Alcoholism [facility])     sober since 2008    Allergic 1959    Seasonal Hay Fever    Anxiety     Asthma     Atrial fibrillation ([address]     cardioversion 8/4/11, 7/1/11, 2/16/11, 9/23/10    Atrial flutter ([address]     status post ablation    Bipolar 1 disorder ([address]     Cataract     2009: [facility]    CHF (congestive heart failure) (HCC)     Chronic insomnia     Colon polyps     on colonoscopy    COPD (chronic obstructive pulmonary disease) (HCC)     Depression     Elevated PSA 3/14    Alexander    Gallstones     GERD (gastroesophageal reflux disease)     Hiatal hernia     small    Hidradenitis suppurativa     Hypertension     Insomnia     Microcytic anemia 10/14/2010    Morbid obesity (BMI 40.0 or higher)     Obstructive sleep apnea     on bipap 17/11resolved 3 years ago    Seizures ([address]     1973-One occurance. Unknown etiology. On Dilantin 1 year.      Past Surgical History:   Procedure Laterality Date    ABDOMEN SURGERY      gastric band    [place]    ATRIAL ABLATION SURGERY  8/5/11    atrial flutter ablation    ATRIAL ABLATION SURGERY  9/1/11    cryoablation for vitamin D (ERGOCALCIFEROL) 94189 units CAPS capsule Take 1 capsule by mouth daily      ferrous sulfate 325 (65 Fe) MG tablet Take 325 mg by mouth once a week 3 times weekly      traZODone (DESYREL) 50 MG tablet Take 150 mg by mouth nightly       primidone (MYSOLINE) 250 MG tablet Take 750 mg by mouth nightly       QUEtiapine (SEROQUEL) 400 MG tablet Take 800 mg by mouth nightly      vitamin B-12 (CYANOCOBALAMIN) 1000 MCG tablet Take 1,000 mcg by mouth daily      atorvastatin (LIPITOR) 20 MG tablet Take 1 tablet by mouth daily      b complex vitamins capsule Take 1 capsule by mouth daily      zinc 50 MG CAPS Take by mouth      Melatonin 10 MG TABS Take 20 mg by mouth nightly           Physical Examination       The following were examined and determined to be normal: Psych/Neuro, Scalp/hair, Head/face, Conjunctivae/eyelids, Gums/teeth/lips, Breast/axilla/chest, Abdomen, RUE, RLE, LLE and Nails/digits. The following were examined and determined to be abnormal: Neck, Back and LUE. Well appearing. 1.  Clear. 2.  Right upper back, right side of neck - several excoriations. 3.  Left hand - few verrucous pink papules. 4.  Forearms - several stuck-on appearing tan-brown verrucous papules and plaques. Assessment and Plan     1. Nummular dermatitis - clear! Triamcinolone 0.1% cream twice daily as needed for recurrences. 2. Pruritus     CeraVe anti itch cream or Sarna lotion as needed. 3. Other viral warts     2 cycles of liquid nitrogen applied to 3 warts on the left hand (no charge). Patient was educated regarding the potential risks of blister formation, discomfort, hypopigmentation, and scar. Wound care was discussed. 4. SK (seborrheic keratosis)     Reassurance. Return in about 3 months (around 10/16/2020).

## 2020-07-21 NOTE — PROGRESS NOTES
Name_______________________________________Printed:____________________  Date and time of surgery____7/31/2020  0900____________________Arrival Time:___0730  FEC_____________   1. The instructions given regarding when and if a patient needs to stop oral intake prior to surgery varies. Follow the specific instructions you were given                  ___Nothing to eat or to drink after Midnight the night before. XXX____Endoscopy patient follow your DRS instructions-generally you will be doing a part of the prep after Midnight                   ____Carbo loading or ERAS instructions will be given to select patients-if you have been given those instructions -please do the following                           The evening before your surgery after dinner before midnight drink 40 ounces of gatorade. If you are diabetic use sugar free. The morning of surgery drink 40 ounces of water. This needs to be finished 3 hours prior to your surgery start time. 2. Take the following pills with a small sip of water on the morning of surgery_________inhaler__________________________________________                3. Aspirin, Ibuprofen, Advil, Naproxen, Vitamin E and other Anti-inflammatory products and supplements should be stopped for 5 -7days before surgery or as directed by your physician. 4. Check with your Doctor regarding stopping Plavix, Coumadin,Eliquis, Lovenox,Effient,Pradaxa,Xarelto, Fragmin or other blood thinners and follow their instructions. 5. Do not smoke, and do not drink any alcoholic beverages 24 hours prior to surgery. This includes NA Beer. Refrain from the usage of any recreational drugs. 6. You may brush your teeth and gargle the morning of surgery. DO NOT SWALLOW WATER   7. You MUST make arrangements for a responsible adult to stay on site while you are here and take you home after your surgery. You will not be allowed to leave alone or drive yourself home.   It is strongly suggested someone stay with you the first 24 hrs. Your surgery will be cancelled if you do not have a ride home. 8. A parent/legal guardian must accompany a child scheduled for surgery and plan to stay at the hospital until the child is discharged. Please do not bring other children with you. 9. Please wear simple, loose fitting clothing to the hospital.  Shanita Smallwood not bring valuables (money, credit cards, checkbooks, etc.) Do not wear any makeup (including no eye makeup) or nail polish on your fingers or toes. 10. DO NOT wear any jewelry or piercings on day of surgery. All body piercing jewelry must be removed. 11. If you have ___dentures, they will be removed before going to the OR; we will provide you a container. If you wear ___contact lenses or ___glasses, they will be removed; please bring a case for them. 12. Please see your family doctor/pediatrician for a history & physical and/or concerning medications. Bring any test results/reports from your physician's office. PCP__________________Phone___________H&P Appt. Date________             13 If you  have a Living Will and Durable Power of  for Healthcare, please bring in a copy. 15. Notify your Surgeon if you develop any illness between now and surgery  time, cough, cold, fever, sore throat, nausea, vomiting, etc.  Please notify your surgeon if you experience dizziness, shortness of breath or blurred vision between now & the time of your surgery             15. DO NOT shave your operative site 96 hours prior to surgery. For face & neck surgery, men may use an electric razor 48 hours prior to surgery. 16. Shower the night before or morning of surgery using an antibacterial soap or as you have been instructed. 17. To provide excellent care visitors will be limited to one in the room at any given time. 18.  Please bring picture ID and insurance card.              19. Visit our web site for additional information:  [email or web address]              20.During flu season no children under the age of 15 are permitted in the hospital for the safety of all patients. 21. If you take a long acting insulin in the evening only  take half of your usual  dose the night  before your procedure              22. If you use a c-pap please bring DOS if staying overnight,             23.For your convenience [facility] has a pharmacy on site to fill your prescriptions. 24. If you use oxygen and have a portable tank please bring it  with you the DOS             25. Bring a complete list of all your medications with name and dose include any supplements. 26. Other__________________________________________   *Please call pre admission testing if you any further questions   [address] [address] [place]  [phone]   [address] [place] [address]       All above information reviewed with patient in person or by phone. Patient verbalizes understanding. All questions and concerns addressed. Patient/Rep____________________                                                                                                                                    Patient instructed to get their COVID-19 test done as directed by their doctor (5-7 days prior to procedure)  or patient states will get on ___7/27/2020_______. I The day the COVID test is done is considered day one. Instructed to self quarantine after test until DOS. There is a one visitor policy at [facility] for the pre-op phase only for surgery and endoscopy cases. The visitor is expected to leave the facility after the patient is taken back for the procedure. Pain management is NO VISITOR policyThe patients ride is expected to remain in the car with a cell phone

## 2020-07-27 ENCOUNTER — OFFICE VISIT (OUTPATIENT)
Dept: PRIMARY CARE CLINIC | Age: 75
End: 2020-07-27
Payer: MEDICARE

## 2020-07-27 PROCEDURE — G8428 CUR MEDS NOT DOCUMENT: HCPCS | Performed by: NURSE PRACTITIONER

## 2020-07-27 PROCEDURE — 99211 OFF/OP EST MAY X REQ PHY/QHP: CPT | Performed by: NURSE PRACTITIONER

## 2020-07-27 PROCEDURE — G8417 CALC BMI ABV UP PARAM F/U: HCPCS | Performed by: NURSE PRACTITIONER

## 2020-07-27 NOTE — PROGRESS NOTES
Kera Redding received a viral test for COVID-19. They were educated on isolation and quarantine as appropriate. For any symptoms, they were directed to seek care from their PCP, given contact information to establish with a doctor, directed to an urgent care or the emergency room. Patient was seen today for pre op Covid testing.

## 2020-07-27 NOTE — PATIENT INSTRUCTIONS
You have received a viral test for COVID-19. Below is education on quarantine per the CDC guidelines. For any symptoms, seek care from your PCP, call [phone] to establish care with a doctor, or go directly to an urgent care or the emergency room. Test results will take 2-7 days and will be sent to you in your [employer] account. If you test positive, you will be contacted via phone. If you test negative, the ONLY communication will be through [address] GO TO [employer] AND SIGN UP FOR [employer]  (LOWER LEFT OF THE HOME PAGE)  No test is 100%. If you have symptoms, you should follow the guidance of quarantine as previously stated. You can still be contagious if you have symptoms. Your [facility] will reach out to you if you have a positive result. They will provide you with a return to work date and note. If you were tested for a pre-op, then you should remain in quarantine until your procedure. How do I know if I need to be in quarantine? If you live in a community where COVID-19 is or might be spreading (currently, that is virtually everywhere in the [place])  Be alert for symptoms. Watch for fever, cough, shortness of breath, or other symptoms of COVID-19.  Take your temperature if symptoms develop.  Practice social distancing. Maintain 6 feet of distance from others and stay out of crowded places.  Follow CDC guidance if symptoms develop. If you feel healthy but:   Recently had close contact with a person with COVID-19 you need to Quarantine:   Stay home until 14 days after your last exposure.  Check your temperature twice a day and watch for symptoms of COVID-19.  If possible, stay away from people who are at higher-risk for getting very sick from COVID-19.   Stay Home and Monitor Your Health if you:   Have been diagnosed with COVID-19, or   Are waiting for test results, or   Have cough, fever, or shortness of breath, or symptoms of COVID-19      When You Can

## 2020-07-29 LAB
REPORT: NORMAL
SARS-COV-2: NOT DETECTED
THIS TEST SENT TO: NORMAL

## 2020-07-30 ENCOUNTER — PATIENT MESSAGE (OUTPATIENT)
Dept: DERMATOLOGY | Age: 75
End: 2020-07-30

## 2020-07-30 NOTE — TELEPHONE ENCOUNTER
From: Lalo Flanagan  To: Conchita Vickers MD  Sent: 7/30/2020 9:47 AM EDT  Subject: Visit Govind Mcintosh, what should I use for the itching in my arms and hands? The cortisone 2. 5? If so, I would need a new prescription for it. Neither of the 2 samples you gave me didn't work.   Yours truly (scratching away),  Cory Champ

## 2020-07-31 ENCOUNTER — ANESTHESIA (OUTPATIENT)
Dept: ENDOSCOPY | Age: 75
End: 2020-07-31
Payer: MEDICARE

## 2020-07-31 ENCOUNTER — TELEPHONE (OUTPATIENT)
Dept: PHARMACY | Age: 75
End: 2020-07-31

## 2020-07-31 ENCOUNTER — HOSPITAL ENCOUNTER (OUTPATIENT)
Age: 75
Setting detail: OUTPATIENT SURGERY
Discharge: HOME | End: 2020-07-31
Attending: INTERNAL MEDICINE | Admitting: INTERNAL MEDICINE
Payer: MEDICARE

## 2020-07-31 ENCOUNTER — ANESTHESIA EVENT (OUTPATIENT)
Dept: ENDOSCOPY | Age: 75
End: 2020-07-31
Payer: MEDICARE

## 2020-07-31 VITALS — DIASTOLIC BLOOD PRESSURE: 65 MMHG | SYSTOLIC BLOOD PRESSURE: 107 MMHG | OXYGEN SATURATION: 100 %

## 2020-07-31 VITALS
BODY MASS INDEX: 31.7 KG/M2 | SYSTOLIC BLOOD PRESSURE: 118 MMHG | HEIGHT: 69 IN | HEART RATE: 70 BPM | WEIGHT: 214 LBS | TEMPERATURE: 97.4 F | OXYGEN SATURATION: 97 % | DIASTOLIC BLOOD PRESSURE: 74 MMHG | RESPIRATION RATE: 16 BRPM

## 2020-07-31 LAB
APTT: 36 SEC (ref 24.2–36.2)
INR BLD: 1.05 (ref 0.86–1.14)
PROTHROMBIN TIME: 12.2 SEC (ref 10–13.2)

## 2020-07-31 PROCEDURE — 36415 COLL VENOUS BLD VENIPUNCTURE: CPT

## 2020-07-31 PROCEDURE — 3609012400 HC EGD TRANSORAL BIOPSY SINGLE/MULTIPLE: Performed by: INTERNAL MEDICINE

## 2020-07-31 PROCEDURE — 7100000011 HC PHASE II RECOVERY - ADDTL 15 MIN: Performed by: INTERNAL MEDICINE

## 2020-07-31 PROCEDURE — 3609010600 HC COLONOSCOPY POLYPECTOMY SNARE/COLD BIOPSY: Performed by: INTERNAL MEDICINE

## 2020-07-31 PROCEDURE — 85730 THROMBOPLASTIN TIME PARTIAL: CPT

## 2020-07-31 PROCEDURE — 2709999900 HC NON-CHARGEABLE SUPPLY: Performed by: INTERNAL MEDICINE

## 2020-07-31 PROCEDURE — 6370000000 HC RX 637 (ALT 250 FOR IP): Performed by: INTERNAL MEDICINE

## 2020-07-31 PROCEDURE — 2500000003 HC RX 250 WO HCPCS: Performed by: NURSE ANESTHETIST, CERTIFIED REGISTERED

## 2020-07-31 PROCEDURE — 7100000010 HC PHASE II RECOVERY - FIRST 15 MIN: Performed by: INTERNAL MEDICINE

## 2020-07-31 PROCEDURE — 2580000003 HC RX 258: Performed by: ANESTHESIOLOGY

## 2020-07-31 PROCEDURE — 85610 PROTHROMBIN TIME: CPT

## 2020-07-31 PROCEDURE — 3700000001 HC ADD 15 MINUTES (ANESTHESIA): Performed by: INTERNAL MEDICINE

## 2020-07-31 PROCEDURE — 6360000002 HC RX W HCPCS: Performed by: NURSE ANESTHETIST, CERTIFIED REGISTERED

## 2020-07-31 PROCEDURE — 3700000000 HC ANESTHESIA ATTENDED CARE: Performed by: INTERNAL MEDICINE

## 2020-07-31 PROCEDURE — 2720000010 HC SURG SUPPLY STERILE: Performed by: INTERNAL MEDICINE

## 2020-07-31 PROCEDURE — 88305 TISSUE EXAM BY PATHOLOGIST: CPT

## 2020-07-31 PROCEDURE — 2500000003 HC RX 250 WO HCPCS: Performed by: INTERNAL MEDICINE

## 2020-07-31 RX ORDER — SODIUM CHLORIDE 9 MG/ML
INJECTION, SOLUTION INTRAVENOUS CONTINUOUS
Status: DISCONTINUED | OUTPATIENT
Start: 2020-07-31 | End: 2020-07-31 | Stop reason: HOSPADM

## 2020-07-31 RX ORDER — LIDOCAINE HYDROCHLORIDE 20 MG/ML
INJECTION, SOLUTION EPIDURAL; INFILTRATION; INTRACAUDAL; PERINEURAL PRN
Status: DISCONTINUED | OUTPATIENT
Start: 2020-07-31 | End: 2020-07-31 | Stop reason: SDUPTHER

## 2020-07-31 RX ORDER — ALBUTEROL SULFATE 90 UG/1
2 AEROSOL, METERED RESPIRATORY (INHALATION) EVERY 6 HOURS PRN
COMMUNITY
End: 2021-06-17 | Stop reason: SDUPTHER

## 2020-07-31 RX ORDER — ACETAMINOPHEN 160 MG
4000 TABLET,DISINTEGRATING ORAL DAILY
COMMUNITY

## 2020-07-31 RX ORDER — GLYCOPYRROLATE 0.2 MG/ML
INJECTION INTRAMUSCULAR; INTRAVENOUS PRN
Status: DISCONTINUED | OUTPATIENT
Start: 2020-07-31 | End: 2020-07-31 | Stop reason: SDUPTHER

## 2020-07-31 RX ORDER — PROPOFOL 10 MG/ML
INJECTION, EMULSION INTRAVENOUS PRN
Status: DISCONTINUED | OUTPATIENT
Start: 2020-07-31 | End: 2020-07-31 | Stop reason: SDUPTHER

## 2020-07-31 RX ADMIN — PROPOFOL 40 MG: 10 INJECTION, EMULSION INTRAVENOUS at 09:33

## 2020-07-31 RX ADMIN — PROPOFOL 40 MG: 10 INJECTION, EMULSION INTRAVENOUS at 09:22

## 2020-07-31 RX ADMIN — PROPOFOL 40 MG: 10 INJECTION, EMULSION INTRAVENOUS at 09:56

## 2020-07-31 RX ADMIN — PROPOFOL 40 MG: 10 INJECTION, EMULSION INTRAVENOUS at 10:09

## 2020-07-31 RX ADMIN — PROPOFOL 80 MG: 10 INJECTION, EMULSION INTRAVENOUS at 09:13

## 2020-07-31 RX ADMIN — LIDOCAINE HYDROCHLORIDE 20 MG: 20 INJECTION, SOLUTION EPIDURAL; INFILTRATION; INTRACAUDAL; PERINEURAL at 09:45

## 2020-07-31 RX ADMIN — LIDOCAINE HYDROCHLORIDE 20 MG: 20 INJECTION, SOLUTION EPIDURAL; INFILTRATION; INTRACAUDAL; PERINEURAL at 09:38

## 2020-07-31 RX ADMIN — PROPOFOL 40 MG: 10 INJECTION, EMULSION INTRAVENOUS at 09:27

## 2020-07-31 RX ADMIN — SODIUM CHLORIDE: 9 INJECTION, SOLUTION INTRAVENOUS at 08:29

## 2020-07-31 RX ADMIN — PROPOFOL 40 MG: 10 INJECTION, EMULSION INTRAVENOUS at 09:38

## 2020-07-31 RX ADMIN — PROPOFOL 40 MG: 10 INJECTION, EMULSION INTRAVENOUS at 09:45

## 2020-07-31 RX ADMIN — LIDOCAINE HYDROCHLORIDE 20 MG: 20 INJECTION, SOLUTION EPIDURAL; INFILTRATION; INTRACAUDAL; PERINEURAL at 09:33

## 2020-07-31 RX ADMIN — GLYCOPYRROLATE 0.2 MG: 0.2 INJECTION, SOLUTION INTRAMUSCULAR; INTRAVENOUS at 09:13

## 2020-07-31 RX ADMIN — LIDOCAINE HYDROCHLORIDE 20 MG: 20 INJECTION, SOLUTION EPIDURAL; INFILTRATION; INTRACAUDAL; PERINEURAL at 09:27

## 2020-07-31 RX ADMIN — PROPOFOL 50 MG: 10 INJECTION, EMULSION INTRAVENOUS at 09:50

## 2020-07-31 RX ADMIN — LIDOCAINE HYDROCHLORIDE 20 MG: 20 INJECTION, SOLUTION EPIDURAL; INFILTRATION; INTRACAUDAL; PERINEURAL at 09:22

## 2020-07-31 RX ADMIN — LIDOCAINE HYDROCHLORIDE 40 MG: 20 INJECTION, SOLUTION EPIDURAL; INFILTRATION; INTRACAUDAL; PERINEURAL at 09:13

## 2020-07-31 RX ADMIN — PROPOFOL 40 MG: 10 INJECTION, EMULSION INTRAVENOUS at 10:02

## 2020-07-31 RX ADMIN — LIDOCAINE HYDROCHLORIDE 20 MG: 20 INJECTION, SOLUTION EPIDURAL; INFILTRATION; INTRACAUDAL; PERINEURAL at 09:17

## 2020-07-31 RX ADMIN — PROPOFOL 40 MG: 10 INJECTION, EMULSION INTRAVENOUS at 09:17

## 2020-07-31 ASSESSMENT — LIFESTYLE VARIABLES: SMOKING_STATUS: 0

## 2020-07-31 ASSESSMENT — PAIN - FUNCTIONAL ASSESSMENT: PAIN_FUNCTIONAL_ASSESSMENT: 0-10

## 2020-07-31 ASSESSMENT — PAIN SCALES - GENERAL
PAINLEVEL_OUTOF10: 0

## 2020-07-31 NOTE — H&P
[address] [place] [address]    Pre-operative History and Physical    Patient: Michelle Olivares  : 1945  CSN:     History Obtained From:   Patient or guardian. HISTORY OF PRESENT ILLNESS:    The patient is a 75 y.o. male here for Endoscopy. Past Medical History:    Past Medical History:   Diagnosis Date    Alcoholism [facility])     sober since     Allergic     Seasonal Hay Fever    Anxiety     Asthma     Atrial fibrillation ([address]     cardioversion 11, 11, 11, 9/23/10    Atrial flutter ([address]     status post ablation    Bipolar 1 disorder ([address]     Cataract     : [facility]    CHF (congestive heart failure) (HCC)     Chronic insomnia     Colon polyps     on colonoscopy    COPD (chronic obstructive pulmonary disease) (HCC)     Depression     Elevated PSA 3/14    Alexander    Gallstones     GERD (gastroesophageal reflux disease)     Hiatal hernia     small    Hidradenitis suppurativa     Hypertension     Insomnia     Microcytic anemia 10/14/2010    Morbid obesity (BMI 40.0 or higher)     Obstructive sleep apnea     on bipap resolved 3 years ago    Seizures ([address]     1973-One occurance. Unknown etiology. On Dilantin 1 year.      Past Surgical History:    Past Surgical History:   Procedure Laterality Date    ABDOMEN SURGERY      gastric band    [place]    ATRIAL ABLATION SURGERY  11    atrial flutter ablation    ATRIAL ABLATION SURGERY  11    cryoablation for atrial fibrillation    ATRIAL ABLATION SURGERY  9/3/15    RFCA for AF with PVI    BARIATRIC SURGERY  13    band over bypass    CARDIOVERSION  x4    CATARACT REMOVAL Bilateral     [address] [place] [address], LAPAROSCOPIC N/A 2016    LAPAROSCOPIC CHOLECYSTECTOMY WITH CHOLANGIOGRAM    COLONOSCOPY  10/1/09, 10/1/12    [place], repeat 3 years    CYST REMOVAL      from chest    EYE SURGERY      Cataract removal - FOR UP TO 2 WEEKS OR UNTIL IMPROVED 454 g 0    hydrocortisone 2.5 % cream APPLY TOPICALLY TO RED AREAS ON FACE TWICE DAILY FOR A FEW DAYS AS NEEDED 30 g 2    warfarin (COUMADIN) 5 MG tablet Take 1.5 tablets by mouth daily Take 7.5mg daily. 135 tablet 2    warfarin (COUMADIN) 5 MG tablet Take 1.5 tablets by mouth three times a week AND 2 tablets four times a week. Take 7.5mg on Sun, Tues and Thurs and 10mg all other days. (Patient taking differently: No sig reported) 150 tablet 2    vitamin D (ERGOCALCIFEROL) 76343 units CAPS capsule Take 1 capsule by mouth daily      ferrous sulfate 325 (65 Fe) MG tablet Take 325 mg by mouth once a week 3 times weekly          Allergies:  Patient has no known allergies. Social History:   Social History     Tobacco Use    Smoking status: Current Every Day Smoker     Packs/day: 2.00     Years: 50.00     Pack years: 100.00     Start date: 1959     Last attempt to quit: 2009     Years since quittin.0    Smokeless tobacco: Never Used   Substance Use Topics    Alcohol use: Yes     Comment: rare     Family History:   Family History   Problem Relation Age of Onset   Reji Filter Migraines Mother     Emphysema Mother     Depression Mother         ECT; meds.  Heart Disease Mother         Mitral Valve Replacement    Heart Disease Father     Hypertension Father     Alcohol Abuse Father     High Blood Pressure Father     High Cholesterol Father         Treated with meds    Stroke Father         AHD    Substance Abuse Father         ETOH Abuse       PHYSICAL EXAM:      /70   Pulse 55   Temp 98.1 °F (36.7 °C) (Temporal)   Resp 16   Ht 5' 9\" (1.753 m)   Wt 214 lb (97.1 kg)   SpO2 98%   BMI 31.60 kg/m²  I        Heart:  RRR, + murmur    Lungs:  CTA and normal effort    Abdomen:   Soft, nt nd. ASSESSMENT AND PLAN:    1. Patient is a 75 y.o. male here for endoscopy with MAC sedation.     2.  Procedure options, risks and benefits reviewed with patient and/or guardian. They express understanding.

## 2020-07-31 NOTE — TELEPHONE ENCOUNTER
----- Message from [place] sent at 7/31/2020 12:36 PM EDT -----  Pt left v/m stating he was holding warfarin for week for a procedure today. MD wants him to hold warfarin another 2 weeks , please call pt to discuss, he needs to r/s his upcoming appt on 8/11 if hes going to be holding warfarin.  [phone]

## 2020-07-31 NOTE — ANESTHESIA POSTPROCEDURE EVALUATION
Department of Anesthesiology  Postprocedure Note    Patient: Kera Redding  MRN: [id number]  YOB: 1945  Date of evaluation: 7/31/2020  Time:  10:25 AM     Procedure Summary     Date:  07/31/20 Room / Location:  [address] [place] [address]    Anesthesia Start:  7500 Anesthesia Stop:  6423    Procedures:       COLONOSCOPY POLYPECTOMY SNARE/COLD BIOPSY (N/A )      EGD BIOPSY (N/A Abdomen) Diagnosis:  (IRON DEFICIENCY ANEMIA D50.9)    Surgeon:  Piper Ferguson MD Responsible Provider:  Sully Mon MD    Anesthesia Type:  MAC ASA Status:  3          Anesthesia Type: MAC    Crissy Phase I: Crissy Score: 10    Crissy Phase II:      Last vitals: Reviewed and per EMR flowsheets.        Anesthesia Post Evaluation    Patient location during evaluation: PACU  Patient participation: complete - patient participated  Level of consciousness: awake and alert  Airway patency: patent  Nausea & Vomiting: no vomiting and no nausea  Complications: no  Cardiovascular status: hemodynamically stable  Respiratory status: acceptable  Hydration status: stable

## 2020-07-31 NOTE — TELEPHONE ENCOUNTER
Called patient. States that he has an ulcer and got staples after surgery so doctor states he has to be off of warfarin for 2 more weeks. Requested that he contact his referring Janusz Worley) but he states that his doctor already contacted Dr. Katherine Mancini to notify him and got his approval. He does not need to be on lovenox per patient. States he might be taken off of warfarin permanently after 2 weeks since his afib is well controlled. He will call and ask when he will find out about going back on it and then let us know. Patient requests that we cancel his apt on 8/11 and that he will call to RS once he is notified of when/if he can resume his warfarin.      Rosario Tobar, PharmD, [facility]

## 2020-08-07 ENCOUNTER — TELEPHONE (OUTPATIENT)
Dept: PHARMACY | Age: 75
End: 2020-08-07

## 2020-08-07 NOTE — TELEPHONE ENCOUNTER
----- Message from Guillermo Villeda sent at 8/7/2020  1:04 PM EDT -----  Regarding: new medication  Contact: Patient  Please call patient. He has a new medication and needs to schedule his next appt.  (patient did not leave details on vm about new medication.  [phone]

## 2020-08-07 NOTE — TELEPHONE ENCOUNTER
Called patient. Starting Prevacid today. Still off of warfarin. Resuming warfarin on Friday, 8/14. Was told to take 10mg of warfarin on 8/14 then continue normal dose of 7.5mg daily and RTC the next week. Pt can only do Tues or Thurs apts and needs midmorning for transportation service.      Cassie Burrows, PharmD, [facility]

## 2020-08-13 RX ORDER — TRIAMCINOLONE ACETONIDE 1 MG/G
CREAM TOPICAL
Qty: 454 G | Refills: 0 | Status: SHIPPED | OUTPATIENT
Start: 2020-08-13 | End: 2020-08-18 | Stop reason: SDUPTHER

## 2020-08-18 ENCOUNTER — PATIENT MESSAGE (OUTPATIENT)
Dept: DERMATOLOGY | Age: 75
End: 2020-08-18

## 2020-08-18 RX ORDER — TRIAMCINOLONE ACETONIDE 1 MG/G
CREAM TOPICAL
Qty: 454 G | Refills: 0 | Status: SHIPPED | OUTPATIENT
Start: 2020-08-18 | End: 2020-09-23 | Stop reason: SDUPTHER

## 2020-08-18 NOTE — TELEPHONE ENCOUNTER
From: Hailey Flanagan  To: Charlie Pemberton MD  Sent: 8/18/2020 8:53 AM EDT  Subject: Prescription Question    I am in need for a refill of Triamcinolone. It has cleared up my arms and I am seeing success with my legs. They no longer itch. The refill should be sent to [address], [phone]. The [facility] I previously used has closed and any prescriptions sent to the old address are no longer processed.    Thanks, Petrina Severe

## 2020-08-20 ENCOUNTER — ANTI-COAG VISIT (OUTPATIENT)
Dept: PHARMACY | Age: 75
End: 2020-08-20
Payer: MEDICARE

## 2020-08-20 LAB — INTERNATIONAL NORMALIZATION RATIO, POC: 2.5

## 2020-08-20 PROCEDURE — 85610 PROTHROMBIN TIME: CPT

## 2020-08-20 PROCEDURE — 99211 OFF/OP EST MAY X REQ PHY/QHP: CPT

## 2020-08-20 NOTE — PROGRESS NOTES
of warfarin Therapy: indefinitely  INR Range:  2.0-3.0     INR 2.5 today     Continue taking 7.5mg daily  Encouraged patient to remain consistent with vit k intake.   Recheck INR in 2 weeks, 9/3    **Patient does NOT want a yellow highlighter**     Referring cardiologist: Dr. Edita Veliz  INR (no units)   Date Value   08/20/2020 2.5   07/31/2020 1.05   07/07/2020 3.2   06/02/2020 3.3   04/27/2020 1.9   11/27/2019 5.16 ([place])   09/10/2018 1.7   08/20/2018 1.3       CLINICAL PHARMACY CONSULT: MED RECONCILIATION/REVIEW ADDENDUM    For Pharmacy Admin Tracking Only    PHSO: No  Total # of Interventions Recommended: 0  - Maintenance Safety Lab Monitoring #: 1  Total Interventions Accepted: 0  Time Spent (min): 15    Bola ReichD

## 2020-09-03 ENCOUNTER — ANTI-COAG VISIT (OUTPATIENT)
Dept: PHARMACY | Age: 75
End: 2020-09-03
Payer: MEDICARE

## 2020-09-03 VITALS — TEMPERATURE: 97.7 F

## 2020-09-03 LAB — INTERNATIONAL NORMALIZATION RATIO, POC: 3.2

## 2020-09-03 PROCEDURE — 85610 PROTHROMBIN TIME: CPT

## 2020-09-03 PROCEDURE — 99211 OFF/OP EST MAY X REQ PHY/QHP: CPT

## 2020-09-03 NOTE — PROGRESS NOTES
Mr. Sienna Andersen is a 67 y.o. y/o male with history of Afib   He presents today for anticoagulation monitoring and adjustment. Pertinent PMH: COPD, CHF, HTN  Patient has his lap band adjusted occasioanly which will continue to affect his diet. Patient Reported Findings:  Yes     No  [x]   []       Patient verifies current dosing regimen as listed- patient cuts out calendar and puts it on top of his pill box and follows day by day- says he follows the AVS     []   [x]       S/S bleeding/bruising/swelling/SOB- denies   []   [x]       Blood in urine or stool- denies   [x]   []       Procedures scheduled in the future at this time has colonoscopy scheduled for 7/31. Authorized to stop warfarin 7/26. Was told he will resume 8/1. --> post op was instructed to hold warfarin for 2 weeks d/t ulcer and polyps. Resumed warfarin on 8/14 taking 10 mg for one day then returned to normal weekly dose    Has an endoscopy October 2, holding warfarin 5 days prior. Will resume taking 10 mg for one day then return to normal weekly dose   []   [x]       Missed dose denies    []   [x]       Extra dose- denies   []   [x]       Change in medications -was on pain meds, done now--> more tylenol recently --> is having iron infusions --> prevacid --> no changes   []   [x]       Change in health/diet/appetite He has had  green beans 2 x last week and has had broccoli 4 times a week which is an increase. He will also have mixed vegetables depending on other food available on the menu. (tani slaw, green beans, corn). He tries to keep Vit K intake consistent, but sometimes has trouble. --->has had less greens than normal ---> no vit k    []   [x]       Change in alcohol use- denies   []   [x]       Change in activity  []   [x]       Hospital admission  []   [x]       Emergency department visit  []   [x]       Other complaints   []   [x]       Tobacco use  Stopped smoking as of Wed 11/28/19. Former smoked 2 1/2 ppd.     Clinical

## 2020-09-23 NOTE — PROGRESS NOTES
Name_______________________________________Printed:____________________  Date and time of surgery____10/2/2020  0830____________________Arrival Time:___0700  FEC_____________   1. The instructions given regarding when and if a patient needs to stop oral intake prior to surgery varies. Follow the specific instructions you were given                  _XXX__Nothing to eat or to drink after Midnight the night before.                             ____Endoscopy patient follow your DRS instructions-generally you will be doing a part of the prep after Midnight                   ____Carbo loading or ERAS instructions will be given to select patients-if you have been given those instructions -please do the following                           The evening before your surgery after dinner before midnight drink 40 ounces of gatorade. If you are diabetic use sugar free. The morning of surgery drink 40 ounces of water. This needs to be finished 3 hours prior to your surgery start time. 2. Take the following pills with a small sip of water on the morning of surgery_________inhaler__________________________________________               3. Aspirin, Ibuprofen, Advil, Naproxen, Vitamin E and other Anti-inflammatory products and supplements should be stopped for 5 -7days before surgery or as directed by your physician. 4. Check with your Doctor regarding stopping Plavix, Coumadin,Eliquis, Lovenox,Effient,Pradaxa,Xarelto, Fragmin or other blood thinners and follow their instructions. 5. Do not smoke, and do not drink any alcoholic beverages 24 hours prior to surgery. This includes NA Beer. Refrain from the usage of any recreational drugs. 6. You may brush your teeth and gargle the morning of surgery. DO NOT SWALLOW WATER   7. You MUST make arrangements for a responsible adult to stay on site while you are here and take you home after your surgery. You will not be allowed to leave alone or drive yourself home.   It is strongly suggested someone stay with you the first 24 hrs. Your surgery will be cancelled if you do not have a ride home. 8. A parent/legal guardian must accompany a child scheduled for surgery and plan to stay at the hospital until the child is discharged. Please do not bring other children with you. 9. Please wear simple, loose fitting clothing to the hospital.  Chavo Arias not bring valuables (money, credit cards, checkbooks, etc.) Do not wear any makeup (including no eye makeup) or nail polish on your fingers or toes. 10. DO NOT wear any jewelry or piercings on day of surgery. All body piercing jewelry must be removed. 11. If you have ___dentures, they will be removed before going to the OR; we will provide you a container. If you wear ___contact lenses or ___glasses, they will be removed; please bring a case for them. 12. Please see your family doctor/pediatrician for a history & physical and/or concerning medications. Bring any test results/reports from your physician's office. PCP__________________Phone___________H&P Appt. Date________             13 If you  have a Living Will and Durable Power of  for Healthcare, please bring in a copy. 15. Notify your Surgeon if you develop any illness between now and surgery  time, cough, cold, fever, sore throat, nausea, vomiting, etc.  Please notify your surgeon if you experience dizziness, shortness of breath or blurred vision between now & the time of your surgery             15. DO NOT shave your operative site 96 hours prior to surgery. For face & neck surgery, men may use an electric razor 48 hours prior to surgery. 16. Shower the night before or morning of surgery using an antibacterial soap or as you have been instructed. 17. To provide excellent care visitors will be limited to one in the room at any given time. 18.  Please bring picture ID and insurance card.              19. Visit our web site for additional information:  [email or web address]              20.During flu season no children under the age of 15 are permitted in the hospital for the safety of all patients. 21. If you take a long acting insulin in the evening only  take half of your usual  dose the night  before your procedure              22. If you use a c-pap please bring DOS if staying overnight,             23.For your convenience [id number] [place] [address] has a pharmacy on site to fill your prescriptions. 24. If you use oxygen and have a portable tank please bring it  with you the DOS             25. Bring a complete list of all your medications with name and dose include any supplements. 26. Other__________________________________________   *Please call pre admission testing if you any further questions   [facility]   [address] [place] [address]. [place]  [phone]   [address] [place] [address]       All above information reviewed with patient in person or by phone. Patient verbalizes understanding. All questions and concerns addressed. Patient/Rep____________________                                                                                                                                    Patient instructed to get their COVID-19 test done as directed by their doctor (5-7 days prior to procedure)  or patient states will get on _9/28/2020  MFF_________. Patient was notified that they need to have an appointment,number to call provided. The day the COVID test is done is considered day one. Instructed to self quarantine after test until DOS. There is a one visitor policy at [facility] for all surgeries and endoscopies. Whether the visitor can stay or will be asked to wait in the car will depend on the current policy and if social distancing can be maintained. The policy is subject to change at any time. Please make sure the visitor has a cell phone that is on,charged and able to accept calls, as this may be the way that the staff communicates with them. Pain management is NO VISITOR policyThe patients ride is expected to remain in the car with a cell phone for communication. If the ride is leaving the hospital grounds please make sure they are back in time for pickup. Have the patient inform the staff on arrival what their rides plans are while the patient is in the facility. At the MAIN there is one visitor allowed. Please note that the visitor policy is subject to change.

## 2020-09-28 ENCOUNTER — OFFICE VISIT (OUTPATIENT)
Dept: PRIMARY CARE CLINIC | Age: 75
End: 2020-09-28
Payer: MEDICARE

## 2020-09-28 PROCEDURE — G8417 CALC BMI ABV UP PARAM F/U: HCPCS | Performed by: NURSE PRACTITIONER

## 2020-09-28 PROCEDURE — 99211 OFF/OP EST MAY X REQ PHY/QHP: CPT | Performed by: NURSE PRACTITIONER

## 2020-09-28 PROCEDURE — G8428 CUR MEDS NOT DOCUMENT: HCPCS | Performed by: NURSE PRACTITIONER

## 2020-09-28 NOTE — PROGRESS NOTES
Dano Montes received a viral test for COVID-19. They were educated on isolation and quarantine as appropriate. For any symptoms, they were directed to seek care from their PCP, given contact information to establish with a doctor, directed to an urgent care or the emergency room.

## 2020-09-28 NOTE — PATIENT INSTRUCTIONS

## 2020-09-30 LAB — SARS-COV-2, NAA: NOT DETECTED

## 2020-10-02 ENCOUNTER — ANESTHESIA (OUTPATIENT)
Dept: ENDOSCOPY | Age: 75
End: 2020-10-02
Payer: MEDICARE

## 2020-10-02 ENCOUNTER — ANESTHESIA EVENT (OUTPATIENT)
Dept: ENDOSCOPY | Age: 75
End: 2020-10-02
Payer: MEDICARE

## 2020-10-02 ENCOUNTER — HOSPITAL ENCOUNTER (OUTPATIENT)
Age: 75
Setting detail: OUTPATIENT SURGERY
Discharge: HOME | End: 2020-10-02
Attending: INTERNAL MEDICINE | Admitting: INTERNAL MEDICINE
Payer: MEDICARE

## 2020-10-02 VITALS
OXYGEN SATURATION: 97 % | HEIGHT: 68 IN | WEIGHT: 228 LBS | RESPIRATION RATE: 16 BRPM | HEART RATE: 62 BPM | SYSTOLIC BLOOD PRESSURE: 117 MMHG | BODY MASS INDEX: 34.56 KG/M2 | TEMPERATURE: 97.3 F | DIASTOLIC BLOOD PRESSURE: 66 MMHG

## 2020-10-02 VITALS — OXYGEN SATURATION: 100 % | SYSTOLIC BLOOD PRESSURE: 112 MMHG | DIASTOLIC BLOOD PRESSURE: 65 MMHG

## 2020-10-02 LAB
APTT: 31.6 SEC (ref 24.2–36.2)
INR BLD: 1.03 (ref 0.86–1.14)
PROTHROMBIN TIME: 11.9 SEC (ref 10–13.2)

## 2020-10-02 PROCEDURE — 7100000010 HC PHASE II RECOVERY - FIRST 15 MIN: Performed by: INTERNAL MEDICINE

## 2020-10-02 PROCEDURE — 3609017100 HC EGD: Performed by: INTERNAL MEDICINE

## 2020-10-02 PROCEDURE — 3700000001 HC ADD 15 MINUTES (ANESTHESIA): Performed by: INTERNAL MEDICINE

## 2020-10-02 PROCEDURE — 7100000011 HC PHASE II RECOVERY - ADDTL 15 MIN: Performed by: INTERNAL MEDICINE

## 2020-10-02 PROCEDURE — 6360000002 HC RX W HCPCS: Performed by: NURSE ANESTHETIST, CERTIFIED REGISTERED

## 2020-10-02 PROCEDURE — 3700000000 HC ANESTHESIA ATTENDED CARE: Performed by: INTERNAL MEDICINE

## 2020-10-02 PROCEDURE — 85730 THROMBOPLASTIN TIME PARTIAL: CPT

## 2020-10-02 PROCEDURE — 2500000003 HC RX 250 WO HCPCS: Performed by: NURSE ANESTHETIST, CERTIFIED REGISTERED

## 2020-10-02 PROCEDURE — 85610 PROTHROMBIN TIME: CPT

## 2020-10-02 PROCEDURE — 2580000003 HC RX 258: Performed by: ANESTHESIOLOGY

## 2020-10-02 PROCEDURE — 2709999900 HC NON-CHARGEABLE SUPPLY: Performed by: INTERNAL MEDICINE

## 2020-10-02 PROCEDURE — 36415 COLL VENOUS BLD VENIPUNCTURE: CPT

## 2020-10-02 RX ORDER — DOCUSATE SODIUM 100 MG/1
100 CAPSULE, LIQUID FILLED ORAL 2 TIMES DAILY
COMMUNITY
End: 2021-02-05

## 2020-10-02 RX ORDER — SODIUM CHLORIDE 9 MG/ML
INJECTION, SOLUTION INTRAVENOUS CONTINUOUS
Status: DISCONTINUED | OUTPATIENT
Start: 2020-10-02 | End: 2020-10-02 | Stop reason: HOSPADM

## 2020-10-02 RX ORDER — PROPOFOL 10 MG/ML
INJECTION, EMULSION INTRAVENOUS PRN
Status: DISCONTINUED | OUTPATIENT
Start: 2020-10-02 | End: 2020-10-02 | Stop reason: SDUPTHER

## 2020-10-02 RX ORDER — LIDOCAINE HYDROCHLORIDE 20 MG/ML
INJECTION, SOLUTION INFILTRATION; PERINEURAL PRN
Status: DISCONTINUED | OUTPATIENT
Start: 2020-10-02 | End: 2020-10-02 | Stop reason: SDUPTHER

## 2020-10-02 RX ADMIN — SODIUM CHLORIDE: 9 INJECTION, SOLUTION INTRAVENOUS at 08:15

## 2020-10-02 RX ADMIN — SODIUM CHLORIDE: 9 INJECTION, SOLUTION INTRAVENOUS at 08:52

## 2020-10-02 RX ADMIN — LIDOCAINE HYDROCHLORIDE 100 MG: 20 INJECTION, SOLUTION INFILTRATION; PERINEURAL at 08:53

## 2020-10-02 RX ADMIN — PROPOFOL 30 MG: 10 INJECTION, EMULSION INTRAVENOUS at 08:57

## 2020-10-02 RX ADMIN — PROPOFOL 70 MG: 10 INJECTION, EMULSION INTRAVENOUS at 08:53

## 2020-10-02 ASSESSMENT — PAIN SCALES - GENERAL
PAINLEVEL_OUTOF10: 0

## 2020-10-02 ASSESSMENT — LIFESTYLE VARIABLES: SMOKING_STATUS: 1

## 2020-10-02 ASSESSMENT — PAIN - FUNCTIONAL ASSESSMENT: PAIN_FUNCTIONAL_ASSESSMENT: 0-10

## 2020-10-02 NOTE — ANESTHESIA PRE PROCEDURE
Department of Anesthesiology  Preprocedure Note       Name:  Travis Cheng   Age:  67 y.o.  :  1945                                          MRN:  [id number]         Date:  10/2/2020      Surgeon: Annette Rader):  Nikia Braun MD    Procedure: Procedure(s):  EGD DIAGNOSTIC ONLY    Medications prior to admission:   Prior to Admission medications    Medication Sig Start Date End Date Taking? Authorizing Provider   B Complex-C-Folic Acid (HM VITAMIN B COMPLEX/VITAMIN C PO) Take by mouth daily   Yes Historical Provider, MD   Cholecalciferol (VITAMIN D3) 50 MCG (2000 UT) CAPS Take 4,000 Units by mouth daily     Historical Provider, MD   albuterol sulfate  (90 Base) MCG/ACT inhaler Inhale 2 puffs into the lungs every 6 hours as needed for Wheezing    Historical Provider, MD   UNABLE TO FIND Iron infusions    Historical Provider, MD   triamcinolone (KENALOG) 0.1 % cream APPLY TO THE RASH ON THE LEGS TWICE DAILY FOR UP TO 2 WEEKS OR UNTIL IMPROVED  Patient taking differently: as needed  20   Brie Vickers MD   warfarin (COUMADIN) 5 MG tablet Take 1.5 tablets by mouth daily Take 7.5mg daily.  20  Adrienne Moses MD   FLUoxetine (PROZAC) 40 MG capsule Take 60 capsules by mouth daily  3/18/19   Historical Provider, MD   ADVAIR DISKUS 250-50 MCG/DOSE AEPB Inhale 1 puff into the lungs 2 times daily 19   Historical Provider, MD   montelukast (SINGULAIR) 10 MG tablet Take 1 tablet by mouth nightly 19   Historical Provider, MD   traZODone (DESYREL) 50 MG tablet Take 150 mg by mouth nightly     Historical Provider, MD   primidone (MYSOLINE) 250 MG tablet Take 750 mg by mouth nightly     Historical Provider, MD   QUEtiapine (SEROQUEL) 400 MG tablet Take 800 mg by mouth nightly    Historical Provider, MD   atorvastatin (LIPITOR) 20 MG tablet Take 1 tablet by mouth daily 16   Historical Provider, MD   zinc 50 MG CAPS Take by mouth    Historical Provider, MD   Melatonin 10 MG TABS Take 20 mg by mouth nightly    Historical Provider, MD       Current medications:    No current facility-administered medications for this encounter. Allergies:  No Known Allergies    Problem List:    Patient Active Problem List   Diagnosis Code    COPD (chronic obstructive pulmonary disease) ([facility] J44.9    Major depressive disorder ([facility]) F32.9    Hypertension I10    Atrial fibrillation I48.91    CHF (congestive heart failure) (formerly [facility]) I50.9    Asthma J45.909    Chronic insomnia F51.04    RBBB (right bundle branch block) I45.10    S/P gastric bypass Z98.84    Hidradenitis suppurativa L73.2    Obstructive sleep apnea G47.33    Seborrheic dermatitis L21.9    Restless leg syndrome G25.81    Fourth degree hemorrhoids K64.3    Symptomatic cholelithiasis K80.20    H/O hyperlipidemia Z86.39    Severe obesity (BMI 35.0-39. 9) with comorbidity ([facility] E66.01    LAP-BAND surgery status Z98.84       Past Medical History:        Diagnosis Date    Alcoholism [facility])     sober since 2008    Allergic 1959    Seasonal Hay Fever    Anxiety     Asthma     Atrial fibrillation ([address]     cardioversion 8/4/11, 7/1/11, 2/16/11, 9/23/10    Atrial flutter ([address]     status post ablation    Bipolar 1 disorder ([address]     Cataract     2009: [facility]    CHF (congestive heart failure) (HCC)     Chronic insomnia     Colon polyps     on colonoscopy    COPD (chronic obstructive pulmonary disease) (HCC)     Depression     Elevated PSA 3/14    [place]    Gallstones     GERD (gastroesophageal reflux disease)     Hiatal hernia     small    Hidradenitis suppurativa     Hypertension     Insomnia     Microcytic anemia 10/14/2010    Morbid obesity (BMI 40.0 or higher)     Obstructive sleep apnea     on bipap 17/11resolved 3 years ago    Seizures ([address]     1973-One occurance. Unknown etiology. On Dilantin 1 year.        Past Surgical History:        Procedure Laterality Date    ABDOMEN SURGERY gastric band    [place]    ATRIAL ABLATION SURGERY  11    atrial flutter ablation    ATRIAL ABLATION SURGERY  11    cryoablation for atrial fibrillation    ATRIAL ABLATION SURGERY  9/3/15    RFCA for AF with PVI    BARIATRIC SURGERY  13    band over bypass    CARDIOVERSION  x4    CATARACT REMOVAL Bilateral     [facility], LAPAROSCOPIC N/A 2016    LAPAROSCOPIC CHOLECYSTECTOMY WITH CHOLANGIOGRAM    COLONOSCOPY  10/1/09, 10/1/12    [place], repeat 3 years    COLONOSCOPY N/A 2020    COLONOSCOPY POLYPECTOMY SNARE/COLD BIOPSY performed by Femi Broussard [place] at [place]      from chest    EYE SURGERY      Cataract removal - both eyes - [facility] at [facility].  HEMORRHOID SURGERY  3/23/16    HERNIA REPAIR      Repaired in conjunction with Band Over Bypass, .     OTHER SURGICAL HISTORY  9/21/15    excision vivian rectal cyst    DIMITRIS-EN-Y GASTRIC BYPASS      [place]    SMALL INTESTINE SURGERY      As part of R-N-Y procedure    UPPER GASTROINTESTINAL ENDOSCOPY  13    [facility]    UPPER GASTROINTESTINAL ENDOSCOPY N/A 2020    EGD BIOPSY performed by Femi Broussard MD at [address], [employer] History:    Social History     Tobacco Use    Smoking status: Current Every Day Smoker     Packs/day: 2.00     Years: 50.00     Pack years: 100.00     Start date: 1959     Last attempt to quit: 2009     Years since quittin.1    Smokeless tobacco: Never Used   Substance Use Topics    Alcohol use: Yes     Comment: rare                                Ready to quit: Not Answered  Counseling given: Not Answered      Vital Signs (Current):   Vitals:    20 1525   Weight: 225 lb (102.1 kg)   Height: 5' 8\" (1.727 m)                                              BP Readings from Last 3 Encounters:   20 118/74   20 107/65   20 102/68 NPO Status:                                                                                 BMI:   Wt Readings from Last 3 Encounters:   09/23/20 225 lb (102.1 kg)   07/31/20 214 lb (97.1 kg)   02/03/20 225 lb (102.1 kg)     Body mass index is 34.21 kg/m². CBC:   Lab Results   Component Value Date    WBC 6.4 11/27/2019    RBC 4.37 11/27/2019    HGB 13.1 11/27/2019    HCT 39.9 11/27/2019    MCV 91.3 11/27/2019    RDW 14.7 11/27/2019     11/27/2019       CMP:   Lab Results   Component Value Date     11/27/2019    K 3.5 11/27/2019     11/27/2019    CO2 25 11/27/2019    BUN 12 11/27/2019    CREATININE 0.5 11/27/2019    GFRAA >60 11/27/2019    GFRAA >60 08/16/2012    AGRATIO 1.3 11/27/2019    LABGLOM >60 11/27/2019    LABGLOM 97 05/27/2011    GLUCOSE 103 11/27/2019    GLUCOSE 96 05/27/2011    PROT 6.1 11/27/2019    PROT 7.4 06/18/2012    CALCIUM 8.4 11/27/2019    BILITOT 0.3 11/27/2019    ALKPHOS 93 11/27/2019    AST 15 11/27/2019    ALT 10 11/27/2019       POC Tests: No results for input(s): POCGLU, POCNA, POCK, POCCL, POCBUN, POCHEMO, POCHCT in the last 72 hours.     Coags:   Lab Results   Component Value Date    PROTIME 12.2 07/31/2020    PROTIME 21 09/10/2018    INR 3.2 09/03/2020    INR 1.05 07/31/2020    APTT 36.0 07/31/2020       HCG (If Applicable): No results found for: PREGTESTUR, PREGSERUM, HCG, HCGQUANT     ABGs: No results found for: PHART, PO2ART, OQP8DRK, WOO9JVR, BEART, X7SPKCJN     Type & Screen (If Applicable):  Lab Results   Component Value Date    LABABO O 09/01/2011    [address] Positive 09/01/2011       Drug/Infectious Status (If Applicable):  No results found for: HIV, HEPCAB    COVID-19 Screening (If Applicable):   Lab Results   Component Value Date    COVID19 NOT DETECTED 09/28/2020         Anesthesia Evaluation  Patient summary reviewed no history of anesthetic complications:   Airway: Mallampati: II  TM distance: >3 FB   Neck ROM: full  Mouth opening: > = 3 FB Dental: Comment: Only has two teeth left most of rest broken off at gumline    Pulmonary:   (+) COPD:  sleep apnea (no longer uses cpap or bipap):  rhonchi (rhonchi mostly clear with cough),  asthma: current smoker    (-) wheezes                           Cardiovascular:    (+) hypertension:, dysrhythmias: atrial fibrillation, CHF:,         Rhythm: regular  Rate: normal                    Neuro/Psych:      (-) seizures, TIA and CVA           GI/Hepatic/Renal:   (+) GERD (denies gerd):,           Endo/Other:    (+) blood dyscrasia: anticoagulation therapy:., .                 Abdominal:           Vascular:                                        Anesthesia Plan      TIVA     ASA 3       Induction: intravenous. Anesthetic plan and risks discussed with patient. Plan discussed with CRNA.                   Juanjose Triana MD   10/2/2020

## 2020-10-02 NOTE — ANESTHESIA POSTPROCEDURE EVALUATION
Department of Anesthesiology  Postprocedure Note    Patient: Delmy Matute  MRN: [id number]  YOB: 1945  Date of evaluation: 10/2/2020  Time:  11:17 AM     Procedure Summary     Date:  10/02/20 Room / Location:  [address] [place] [address]    Anesthesia Start:  5153 Anesthesia Stop:  3829    Procedure:  EGD DIAGNOSTIC ONLY (N/A Abdomen) Diagnosis:  (IRON DEFICIENCY ANEMIA D50.9)    Surgeon:  Migdalia Ha MD Responsible Provider:  Ronald Au MD    Anesthesia Type:  TIVA ASA Status:  3          Anesthesia Type: TIVA    Crissy Phase I: Crissy Score: 10    Crissy Phase II: Crissy Score: 10    Last vitals: Reviewed and per EMR flowsheets.        Anesthesia Post Evaluation    Patient location during evaluation: PACU  Patient participation: complete - patient participated  Level of consciousness: awake  Airway patency: patent  Nausea & Vomiting: no vomiting  Complications: no  Cardiovascular status: hemodynamically stable  Respiratory status: acceptable  Hydration status: euvolemic

## 2020-10-02 NOTE — H&P
[address] [place] [address]    Pre-operative History and Physical    Patient: Rhoda Mtz  : 1945  CSN:     History Obtained From:   Patient or guardian. HISTORY OF PRESENT ILLNESS:    The patient is a 67 y.o. male here for Endoscopy. Past Medical History:    Past Medical History:   Diagnosis Date    Alcoholism [facility])     sober since     Allergic     Seasonal Hay Fever    Anxiety     Asthma     Atrial fibrillation ([address]     cardioversion 11, 11, 11, 9/23/10    Atrial flutter ([address]     status post ablation    Bipolar 1 disorder ([address]     Cataract     : [facility] Eyes[facility]    CHF (congestive heart failure) (HCC)     Chronic insomnia     Colon polyps     on colonoscopy    COPD (chronic obstructive pulmonary disease) (HCC)     Depression     Elevated PSA 3/14    Alexander    Gallstones     GERD (gastroesophageal reflux disease)     Hiatal hernia     small    Hidradenitis suppurativa     Hypertension     Insomnia     Microcytic anemia 10/14/2010    Morbid obesity (BMI 40.0 or higher)     Obstructive sleep apnea     on bipap resolved 3 years ago    Seizures ([address]     1973-One occurance. Unknown etiology. On Dilantin 1 year.      Past Surgical History:    Past Surgical History:   Procedure Laterality Date    ABDOMEN SURGERY      gastric band    [place]    ATRIAL ABLATION SURGERY  11    atrial flutter ablation    ATRIAL ABLATION SURGERY  11    cryoablation for atrial fibrillation    ATRIAL ABLATION SURGERY  9/3/15    RFCA for AF with PVI    BARIATRIC SURGERY  13    band over bypass    CARDIOVERSION  x4    CATARACT REMOVAL Bilateral     [address] [place] [address], LAPAROSCOPIC N/A 2016    LAPAROSCOPIC CHOLECYSTECTOMY WITH CHOLANGIOGRAM    COLONOSCOPY  10/1/09, 10/1/12    [facility]egan, repeat 3 years    COLONOSCOPY N/A 2020    COLONOSCOPY POLYPECTOMY SNARE/COLD BIOPSY performed by Kate Rivera MD at [place]      from chest    EYE SURGERY      Cataract removal - both eyes - [facility].  HEMORRHOID SURGERY  3/23/16    HERNIA REPAIR      Repaired in conjunction with Band Over Bypass, 2013.  OTHER SURGICAL HISTORY  9/21/15    excision vivian rectal cyst    DIMITRIS-EN-Y GASTRIC BYPASS  2001    [place]    SMALL INTESTINE SURGERY      As part of R-N-Y procedure    UPPER GASTROINTESTINAL ENDOSCOPY  5/13/13    [place]    UPPER GASTROINTESTINAL ENDOSCOPY N/A 7/31/2020    EGD BIOPSY performed by Kate Rivera MD at [address]     Medications Prior to Admission:   No current facility-administered medications on file prior to encounter.       Current Outpatient Medications on File Prior to Encounter   Medication Sig Dispense Refill    docusate sodium (DULCOLAX) 100 MG capsule Take 100 mg by mouth 2 times daily      B Complex-C-Folic Acid (HM VITAMIN B COMPLEX/VITAMIN C PO) Take by mouth daily      Cholecalciferol (VITAMIN D3) 50 MCG (2000 UT) CAPS Take 4,000 Units by mouth daily       albuterol sulfate  (90 Base) MCG/ACT inhaler Inhale 2 puffs into the lungs every 6 hours as needed for Wheezing      FLUoxetine (PROZAC) 40 MG capsule Take 60 capsules by mouth daily       ADVAIR DISKUS 250-50 MCG/DOSE AEPB Inhale 1 puff into the lungs 2 times daily  0    montelukast (SINGULAIR) 10 MG tablet Take 1 tablet by mouth nightly      traZODone (DESYREL) 50 MG tablet Take 150 mg by mouth nightly       primidone (MYSOLINE) 250 MG tablet Take 750 mg by mouth nightly       QUEtiapine (SEROQUEL) 400 MG tablet Take 800 mg by mouth nightly      atorvastatin (LIPITOR) 20 MG tablet Take 1 tablet by mouth daily      zinc 50 MG CAPS Take by mouth      Melatonin 10 MG TABS Take 20 mg by mouth nightly      UNABLE TO FIND Iron infusions      triamcinolone (KENALOG) 0.1 % cream APPLY TO THE RASH ON THE LEGS TWICE DAILY FOR UP TO 2 WEEKS OR UNTIL IMPROVED (Patient taking differently: as needed ) 80 g 0    warfarin (COUMADIN) 5 MG tablet Take 1.5 tablets by mouth daily Take 7.5mg daily. 135 tablet 2        Allergies:  Patient has no known allergies. Social History:   Social History     Tobacco Use    Smoking status: Current Every Day Smoker     Packs/day: 2.00     Years: 50.00     Pack years: 100.00     Start date: 1959     Last attempt to quit: 2009     Years since quittin.1    Smokeless tobacco: Never Used   Substance Use Topics    Alcohol use: Yes     Comment: rare     Family History:   Family History   Problem Relation Age of Onset   Dossie Lindsey Migraines Mother     Emphysema Mother     Depression Mother         ECT; meds.  Heart Disease Mother         Mitral Valve Replacement    Heart Disease Father     Hypertension Father     Alcohol Abuse Father     High Blood Pressure Father     High Cholesterol Father         Treated with meds    Stroke Father         AHD    Substance Abuse Father         ETOH Abuse       PHYSICAL EXAM:      BP (!) 117/56   Pulse 61   Temp 98 °F (36.7 °C) (Temporal)   Resp 16   Ht 5' 8\" (1.727 m)   Wt 228 lb (103.4 kg)   SpO2 96%   BMI 34.67 kg/m²  I        Heart:  RRR, normal s1s2    Lungs:  CTA and normal effort    Abdomen:   Soft, nt nd. ASSESSMENT AND PLAN:    1. Patient is a 67 y.o. male here for endoscopy with MAC sedation. 2.  Procedure options, risks and benefits reviewed with patient and/or guardian. They express understanding.

## 2020-10-08 ENCOUNTER — ANTI-COAG VISIT (OUTPATIENT)
Dept: PHARMACY | Age: 75
End: 2020-10-08
Payer: MEDICARE

## 2020-10-08 VITALS — TEMPERATURE: 97.3 F

## 2020-10-08 LAB — INTERNATIONAL NORMALIZATION RATIO, POC: 2

## 2020-10-08 PROCEDURE — 85610 PROTHROMBIN TIME: CPT

## 2020-10-08 PROCEDURE — 99211 OFF/OP EST MAY X REQ PHY/QHP: CPT

## 2020-10-08 NOTE — PROGRESS NOTES
Mr. Steel Staff is a 67 y.o. y/o male with history of Afib   He presents today for anticoagulation monitoring and adjustment. Pertinent PMH: COPD, CHF, HTN  Patient has his lap band adjusted occasioanly which will continue to affect his diet. Patient Reported Findings:  Yes     No  [x]   []       Patient verifies current dosing regimen as listed- patient cuts out calendar and puts it on top of his pill box and follows day by day- says he follows the AVS     []   [x]       S/S bleeding/bruising/swelling/SOB- denies   []   [x]       Blood in urine or stool- denies   [x]   []       Procedures scheduled in the future at this time has colonoscopy scheduled for 7/31. Authorized to stop warfarin 7/26. Was told he will resume 8/1. --> post op was instructed to hold warfarin for 2 weeks d/t ulcer and polyps. Resumed warfarin on 8/14 taking 10 mg for one day then returned to normal weekly dose    Had endoscopy October 2, held warfarin 5 days prior   []   [x]       Missed dose denies    []   [x]       Extra dose- denies   []   [x]       Change in medications -was on pain meds, done now--> more tylenol recently --> is having iron infusions --> prevacid --> no changes   []   [x]       Change in health/diet/appetite He has had  green beans 2 x last week and has had broccoli 4 times a week which is an increase. He will also have mixed vegetables depending on other food available on the menu. (tani slaw, green beans, corn). He tries to keep Vit K intake consistent, but sometimes has trouble. --->has had less greens than normal ---> no vit k    []   [x]       Change in alcohol use- denies   []   [x]       Change in activity  []   [x]       Hospital admission  []   [x]       Emergency department visit  []   [x]       Other complaints   []   [x]       Tobacco use  Stopped smoking as of Wed 11/28/19. Former smoked 2 1/2 ppd.     Clinical Outcomes:  Yes     No  []   [x]       Major bleeding event  []   [x] Thromboembolic event  Uses a pillbox  Duration of warfarin Therapy: indefinitely  INR Range:  2.0-3.0     INR 2 today after resuming warfarin 1 week ago from procedure     Continue weekly dose of 7.5 mg daily. Encouraged patient to remain consistent with vit k intake.   Recheck INR in 4 weeks, 11/5    **Patient does NOT want a yellow highlighter**     Referring cardiologist: Dr. Live Section  INR (no units)   Date Value   10/08/2020 2   10/02/2020 1.03   09/03/2020 3.2   08/20/2020 2.5   07/31/2020 1.05   07/07/2020 3.2   11/27/2019 5.16 ([facility])   09/10/2018 1.7       CLINICAL PHARMACY CONSULT: MED RECONCILIATION/REVIEW ADDENDUM    For Pharmacy Admin Tracking Only    PHSO: No  Total # of Interventions Recommended: 0  - Maintenance Safety Lab Monitoring #: 1  Total Interventions Accepted: 0  Time Spent (min): 15    Bola PerezD

## 2020-10-14 ENCOUNTER — OFFICE VISIT (OUTPATIENT)
Dept: DERMATOLOGY | Age: 75
End: 2020-10-14
Payer: MEDICARE

## 2020-10-14 ENCOUNTER — TELEPHONE (OUTPATIENT)
Dept: PHARMACY | Age: 75
End: 2020-10-14

## 2020-10-14 VITALS — TEMPERATURE: 97.2 F

## 2020-10-14 PROCEDURE — 1123F ACP DISCUSS/DSCN MKR DOCD: CPT | Performed by: DERMATOLOGY

## 2020-10-14 PROCEDURE — G8417 CALC BMI ABV UP PARAM F/U: HCPCS | Performed by: DERMATOLOGY

## 2020-10-14 PROCEDURE — 4040F PNEUMOC VAC/ADMIN/RCVD: CPT | Performed by: DERMATOLOGY

## 2020-10-14 PROCEDURE — G8427 DOCREV CUR MEDS BY ELIG CLIN: HCPCS | Performed by: DERMATOLOGY

## 2020-10-14 PROCEDURE — 4004F PT TOBACCO SCREEN RCVD TLK: CPT | Performed by: DERMATOLOGY

## 2020-10-14 PROCEDURE — 99213 OFFICE O/P EST LOW 20 MIN: CPT | Performed by: DERMATOLOGY

## 2020-10-14 PROCEDURE — G8484 FLU IMMUNIZE NO ADMIN: HCPCS | Performed by: DERMATOLOGY

## 2020-10-14 RX ORDER — CLOBETASOL PROPIONATE 0.5 MG/G
OINTMENT TOPICAL
Qty: 120 G | Refills: 2 | Status: SHIPPED | OUTPATIENT
Start: 2020-10-14 | End: 2021-04-20 | Stop reason: ALTCHOICE

## 2020-10-14 RX ORDER — DOXYCYCLINE HYCLATE 100 MG
100 TABLET ORAL 2 TIMES DAILY
Qty: 28 TABLET | Refills: 0 | Status: SHIPPED | OUTPATIENT
Start: 2020-10-14 | End: 2020-10-27 | Stop reason: SDUPTHER

## 2020-10-14 NOTE — PROGRESS NOTES
[facility]  Bora Johnson MD  [phone]      [place] Praveen McKittricemma  [birth year]    67 y.o. male     Date of Visit: 10/14/2020    Chief Complaint: rash    History of Present Illness:    1. He returns today to follow up for a history of chronic nummular dermatitis. He reports worsening over the last few months. He has stopped using triamcinolone 0.1% cream because he felt that it was not effective. Today he complains of involvement of the back and lower extremities. He last received intramuscular Kenalog in June 2020 with marked improvement. 2.  He has a history of hidradenitis suppurativa-complains of 2 painful lesions on the left lower flank and also on the right proximal inner thigh. Review of Systems:  Gen: Feels well, good sense of health. Past Medical History, Family History, Surgical History, Medications and Allergies reviewed. Past Medical History:   Diagnosis Date    Alcoholism [facility])     sober since 2008    Allergic 1959    Seasonal Hay Fever    Anxiety     Asthma     Atrial fibrillation ([address]     cardioversion 8/4/11, 7/1/11, 2/16/11, 9/23/10    Atrial flutter ([address]     status post ablation    Bipolar 1 disorder ([address]     Cataract     2009: [facility]    CHF (congestive heart failure) (HCC)     Chronic insomnia     Colon polyps     on colonoscopy    COPD (chronic obstructive pulmonary disease) (HCC)     Depression     Elevated PSA 3/14    Alexander    Gallstones     GERD (gastroesophageal reflux disease)     Hiatal hernia     small    Hidradenitis suppurativa     Hypertension     Insomnia     Microcytic anemia 10/14/2010    Morbid obesity (BMI 40.0 or higher)     Obstructive sleep apnea     on bipap 17/11resolved 3 years ago    Seizures ([address]     1973-One occurance. Unknown etiology. On Dilantin 1 year.      Past Surgical History:   Procedure Laterality Date    ABDOMEN SURGERY      gastric band    [place]    ATRIAL ABLATION SURGERY  8/5/11    atrial flutter ablation    ATRIAL ABLATION SURGERY  9/1/11    cryoablation for atrial fibrillation    ATRIAL ABLATION SURGERY  9/3/15    RFCA for AF with PVI    BARIATRIC SURGERY  8/5/13    band over bypass    CARDIOVERSION  x4    CATARACT REMOVAL Bilateral 2009    [facility], LAPAROSCOPIC N/A 06/20/2016    LAPAROSCOPIC CHOLECYSTECTOMY WITH CHOLANGIOGRAM    COLONOSCOPY  10/1/09, 10/1/12    [place], repeat 3 years    COLONOSCOPY N/A 7/31/2020    COLONOSCOPY POLYPECTOMY SNARE/COLD BIOPSY performed by Migdalia Ha MD at [place]      from chest    EYE SURGERY      Cataract removal - both eyes - performed at [facility].  HEMORRHOID SURGERY  3/23/16    HERNIA REPAIR      Repaired in conjunction with Band Over Bypass, 2013.  OTHER SURGICAL HISTORY  9/21/15    excision vivian rectal cyst    DIMITRIS-EN-Y GASTRIC BYPASS  2001    [place]    SMALL INTESTINE SURGERY      As part of R-N-Y procedure    [facility]    UPPER GASTROINTESTINAL ENDOSCOPY N/A 7/31/2020    EGD BIOPSY performed by Migdalia Ha MD at [address] N/A 10/2/2020    EGD DIAGNOSTIC ONLY performed by Migdalia Ha MD at [address]       No Known Allergies  Outpatient Medications Marked as Taking for the 10/14/20 encounter (Office Visit) with Zahraa Rios MD   Medication Sig Dispense Refill    Lansoprazole (PREVACID PO) Take by mouth      clobetasol (TEMOVATE) 0.05 % ointment Apply to affected areas twice daily until improved.  120 g 2    doxycycline hyclate (VIBRA-TABS) 100 MG tablet Take 1 tablet by mouth 2 times daily for 14 days 28 tablet 0    docusate sodium (DULCOLAX) 100 MG capsule Take 100 mg by mouth 2 times daily      B Complex-C-Folic Acid (HM VITAMIN B COMPLEX/VITAMIN C PO) Take by mouth daily      Cholecalciferol (VITAMIN D3) 50 MCG (2000 UT) CAPS Take 4,000 Units by mouth daily       albuterol sulfate  (90 Base) MCG/ACT inhaler Inhale 2 puffs into the lungs every 6 hours as needed for Wheezing      triamcinolone (KENALOG) 0.1 % cream APPLY TO THE RASH ON THE LEGS TWICE DAILY FOR UP TO 2 WEEKS OR UNTIL IMPROVED (Patient taking differently: as needed ) 80 g 0    warfarin (COUMADIN) 5 MG tablet Take 1.5 tablets by mouth daily Take 7.5mg daily. 135 tablet 2    FLUoxetine (PROZAC) 40 MG capsule Take 60 capsules by mouth daily       ADVAIR DISKUS 250-50 MCG/DOSE AEPB Inhale 1 puff into the lungs 2 times daily  0    montelukast (SINGULAIR) 10 MG tablet Take 1 tablet by mouth nightly      traZODone (DESYREL) 50 MG tablet Take 150 mg by mouth nightly       primidone (MYSOLINE) 250 MG tablet Take 750 mg by mouth nightly       QUEtiapine (SEROQUEL) 400 MG tablet Take 800 mg by mouth nightly      atorvastatin (LIPITOR) 20 MG tablet Take 1 tablet by mouth daily      zinc 50 MG CAPS Take by mouth      Melatonin 10 MG TABS Take 20 mg by mouth nightly           Physical Examination       The following were examined and determined to be normal: Psych/Neuro, Scalp/hair, Conjunctivae/eyelids, Gums/teeth/lips, Neck, Breast/axilla/chest, RUE and LUE. The following were examined and determined to be abnormal: Abdomen, Back, RLE, LLE and Genitalia/groin/buttocks. Well-appearing. 1.  Scattered mainly on the legs, dorsum of the feet and to a lesser extent the lower trunk and buttocks are multiple round crusted and scaly erythematous plaques. 2.  Left lower flank and right proximal inner thigh with 2 tender erythematous nodules. Assessment and Plan     1. Nummular dermatitis - moderately extensive, flaring    Clobetasol ointment twice daily to affected areas until improved. Mild soap daily with showers. Thicker cream based moisturizer 1-2 times daily.      2. Hidradenitis suppurativa - 2 new lesions    Doxycycline 100 mg twice daily for the next 2 weeks. He will check with his provider regarding adjustment of warfarin given the interaction. Return in about 4 weeks (around 11/11/2020).

## 2020-10-14 NOTE — PATIENT INSTRUCTIONS
For your well being, we encourage you to stop smoking or using tobacco products. Smoking and the use of other tobacco products, including cigars and smokeless tobacco, causes or worsens numerous diseases and conditions. Some products also expose nearby people to toxic secondhand smoke. Smoking is the leading cause of preventable death in the [place] causing over 438,000 deaths per year. Secondhand smoke is a serious health hazard for people of all ages, causing more than 41,000 deaths each year. Marijuana smoke contains many of the same toxins, irritants and carcinogens as tobacco smoke. Electronic cigarettes are a new tobacco product, and the potential health consequences and safety of these products are unknown. Smokeless Tobacco products are a known cause of cancer, and are not a safe alternative to cigarettes. 1. Make the decision to quit smoking  Stopping smoking is the best thing you can do for your health. If you smoke, you are more likely to get diseases of the lungs, heart, and brain. You are also more likely to get many kinds of cancer. After you quit, you will be less likely to get these diseases. Stopping smoking is hard--but you can do it! Your doctor can help you. 2. Get ready to quit  Once you decide to quit, make a plan with the help of your doctor. Here are some things you need to do:  Choose a day to quit. Talk to your primary care doctor about using the nicotine patch, gum, inhaler, or nasal spray to help you quit smoking. Getting nicotine some way other than in a cigarette can help make quitting easier. Talk to your primary care doctor about using a prescription medicine like bupropion (brand name: Zyban) to reduce your urge to smoke. If you decide to use a medicine, start taking it two weeks before your quit day. Talk with your primary care doctor about when you smoke. For example, you may smoke first thing in the morning, after a meal, or when you feel stressed.  Plan what you will do instead of smoking. Tell your family and friends that you are going to try to quit and on what date. Put together a list of phone numbers of friends and family members who can give you support when you feel you might break down and have a cigarette. Before your quit day, put all tobacco products, ashtrays, and lighters away. 3. Put your plan into action  When you wake up on your quit day, start using a nicotine replacement method if you had planned to do that. For the first few days after you quit, you may have some signs of nicotine withdrawal. You may feel restless and cranky. You may find it hard to think. You might need to change your dose of nicotine if these signs upset you. Do not smoke! If you feel like you want to smoke, call a friend or family member who has agreed to help you. Also, there might be someone in your doctor's office you can call. Put into action your plans for doing things other than smoking. For example, when you feel the urge to smoke, you might take a walk. Or, you might visit friends who do not smoke. It is best to stay away from places where you used to smoke. 4. If you return to smoking--  Quitting smoking is not easy. Many people have to try several times before they succeed. If you start smoking again, call your primary care doctor's office soon to talk about what happened. Think about what you can do to keep from smoking when you try to quit again. Part of this handout is provided by the [employer]. More information is available at the American Lung Association https://reynoso.com/.  This information provides a general overview and may not apply to everyone. Talk to your primary care doctor to find out if this information applies to you and to get more information on this subject.

## 2020-10-14 NOTE — TELEPHONE ENCOUNTER
Patient was prescribed doxycycline 100 mg BID x 2 weeks for a dermatologic infection. Current warfarin dosing is 7.5 mg every day. Discussed with patient to reduce dose while on antibiotics to 5 mg on Monday and 7.5 mg all other days (5% decrease). Tried to moved patient appointment to 10/29 to check INR earlier, but patient was busy with other appointments. Will keep appointment for 11/5.     Juvencio De La Rosa, PharmD  PGY1 Pharmacy Resident  [id number]

## 2020-10-27 RX ORDER — DOXYCYCLINE HYCLATE 100 MG
100 TABLET ORAL 2 TIMES DAILY
Qty: 28 TABLET | Refills: 0 | Status: SHIPPED | OUTPATIENT
Start: 2020-10-27 | End: 2020-11-10

## 2020-11-05 ENCOUNTER — ANTI-COAG VISIT (OUTPATIENT)
Dept: PHARMACY | Age: 75
End: 2020-11-05
Payer: MEDICARE

## 2020-11-05 VITALS — TEMPERATURE: 97.1 F

## 2020-11-05 LAB — INTERNATIONAL NORMALIZATION RATIO, POC: 2

## 2020-11-05 PROCEDURE — 99211 OFF/OP EST MAY X REQ PHY/QHP: CPT

## 2020-11-05 PROCEDURE — 85610 PROTHROMBIN TIME: CPT

## 2020-11-05 NOTE — PROGRESS NOTES
Mr. Rodolfo Lowe is a 67 y.o. y/o male with history of Afib   He presents today for anticoagulation monitoring and adjustment. Pertinent PMH: COPD, CHF, HTN  Patient has his lap band adjusted occasioanly which will continue to affect his diet. Patient Reported Findings:  Yes     No  [x]   []       Patient verifies current dosing regimen as listed- patient cuts out calendar and puts it on top of his pill box and follows day by day- says he follows the AVS- has been taking 5mg Monday and 7.5mg al lother days     []   [x]       S/S bleeding/bruising/swelling/SOB- denies   []   [x]       Blood in urine or stool- denies   []   [x]       Procedures scheduled in the future at this time  []   [x]       Miss ed dose denies    []   [x]       Extra dose- denies   []   [x]       Change in medications -was on pain meds, done now--> more tylenol recently --> is having iron infusions --> prevacid --> no changes--> doxycycline 100mg bid 10/14-11/8, starting hydroxyzine pamoate next week  []   [x]       Change in health/diet/appetite He has had  green beans 2 x last week and has had broccoli 4 times a week which is an increase. He will also have mixed vegetables depending on other food available on the menu. (tani slaw, green beans, corn). He tries to keep Vit K intake consistent, but sometimes has trouble. --->has had less greens than normal ---> no vit k--> no change    []   [x]       Change in alcohol use- denies   []   [x]       Change in activity  []   [x]       Hospital admission  []   [x]       Emergency department visit  []   [x]       Other complaints   []   [x]       Tobacco use  Stopped smoking as of Wed 11/28/19. Former smoked 2 1/2 ppd. Clinical Outcomes:  Yes     No  []   [x]       Major bleeding event  []   [x]       Thromboembolic event  Uses a pillbox  Duration of warfarin Therapy: indefinitely  INR Range:  2.0-3.0     INR 2 today      Continue weekly dose of 7.5 mg daily.   Encouraged patient to remain consistent with vit k intake.   Patient will call us if MD continues antibiotic past 11/8  Refilled rx at [address] [facility] INR in 4 weeks, 12/3    **Patient does NOT want a yellow highlighter**     Referring cardiologist: Dr. Marquis Funk  INR (no units)   Date Value   11/05/2020 2.0   10/08/2020 2   10/02/2020 1.03   09/03/2020 3.2   08/20/2020 2.5   07/31/2020 1.05   11/27/2019 5.16 ([facility])   09/10/2018 1.7     CLINICAL PHARMACY CONSULT: MED RECONCILIATION/REVIEW ADDENDUM    For Pharmacy Admin Tracking Only    PHSO: No  Total # of Interventions Recommended: 1  - Refills Provided #: 1  - Maintenance Safety Lab Monitoring #: 1  Total Interventions Accepted: 1  Time Spent (min): Eloy Mcdermott, PharmD

## 2020-11-05 NOTE — TELEPHONE ENCOUNTER
Warfarin prescription phoned into [facility] to [address] under Dr. Shemar Shipman  Warfarin 5 mg tabs  Take 7.5mg daily  90 days   2 refills

## 2020-11-09 ENCOUNTER — OFFICE VISIT (OUTPATIENT)
Dept: DERMATOLOGY | Age: 75
End: 2020-11-09
Payer: MEDICARE

## 2020-11-09 VITALS — TEMPERATURE: 97.3 F

## 2020-11-09 PROCEDURE — 99213 OFFICE O/P EST LOW 20 MIN: CPT | Performed by: DERMATOLOGY

## 2020-11-09 PROCEDURE — G8417 CALC BMI ABV UP PARAM F/U: HCPCS | Performed by: DERMATOLOGY

## 2020-11-09 PROCEDURE — 1123F ACP DISCUSS/DSCN MKR DOCD: CPT | Performed by: DERMATOLOGY

## 2020-11-09 PROCEDURE — G8427 DOCREV CUR MEDS BY ELIG CLIN: HCPCS | Performed by: DERMATOLOGY

## 2020-11-09 PROCEDURE — G8484 FLU IMMUNIZE NO ADMIN: HCPCS | Performed by: DERMATOLOGY

## 2020-11-09 PROCEDURE — 4040F PNEUMOC VAC/ADMIN/RCVD: CPT | Performed by: DERMATOLOGY

## 2020-11-09 PROCEDURE — 4004F PT TOBACCO SCREEN RCVD TLK: CPT | Performed by: DERMATOLOGY

## 2020-11-09 NOTE — PROGRESS NOTES
9/1/11    cryoablation for atrial fibrillation    ATRIAL ABLATION SURGERY  9/3/15    RFCA for AF with PVI    BARIATRIC SURGERY  8/5/13    band over bypass    CARDIOVERSION  x4    CATARACT REMOVAL Bilateral 2009    [facility], LAPAROSCOPIC N/A 06/20/2016    LAPAROSCOPIC CHOLECYSTECTOMY WITH CHOLANGIOGRAM    COLONOSCOPY  10/1/09, 10/1/12    [place], repeat 3 years    COLONOSCOPY N/A 7/31/2020    COLONOSCOPY POLYPECTOMY SNARE/COLD BIOPSY performed by Jose Trejo MD at [facility]      from chest    EYE SURGERY      Cataract removal - both eyes - performed at [facility].  HEMORRHOID SURGERY  3/23/16    HERNIA REPAIR      Repaired in conjunction with Band Over Bypass, 2013.  OTHER SURGICAL HISTORY  9/21/15    excision vivian rectal cyst    DIMITRIS-EN-Y GASTRIC BYPASS  2001    [place]    SMALL INTESTINE SURGERY      As part of R-N-Y procedure    UPPER GASTR[facility] ENDOSCOPY  [facility]/13/13    [facility]    UPPER GASTROINTESTINAL ENDOSCOPY N/A 7/31/2020    EGD BIOPSY performed by Jose Trejo MD at [facility] N/A 10/2/2020    EGD DIAGNOSTIC ONLY performed by Jose Trejo MD at [facility]       No Known Allergies  Outpatient Medications Marked as Taking for the 11/9/20 encounter (Office Visit) with Edwin Robles MD   Medication Sig Dispense Refill    doxycycline hyclate (VIBRA-TABS) 100 MG tablet Take 1 tablet by mouth 2 times daily for 14 days 28 tablet 0    Lansoprazole (PREVACID PO) Take by mouth      clobetasol (TEMOVATE) 0.05 % ointment Apply to affected areas twice daily until improved.  120 g 2    docusate sodium (DULCOLAX) 100 MG capsule Take 100 mg by mouth 2 times daily      B Complex-C-Folic Acid (HM VITAMIN B COMPLEX/VITAMIN C PO) Take by mouth daily      Cholecalciferol (VITAMIN D3) 50 MCG (2000 UT) CAPS Take 4,000 Units by mouth daily       albuterol sulfate  (90 Base) MCG/ACT inhaler Inhale 2 puffs into the lungs every 6 hours as needed for Wheezing      UNABLE TO FIND Iron infusions      triamcinolone (KENALOG) 0.1 % cream APPLY TO THE RASH ON THE LEGS TWICE DAILY FOR UP TO 2 WEEKS OR UNTIL IMPROVED (Patient taking differently: as needed ) 80 g 0    FLUoxetine (PROZAC) 40 MG capsule Take 60 capsules by mouth daily       ADVAIR DISKUS 250-50 MCG/DOSE AEPB Inhale 1 puff into the lungs 2 times daily  0    montelukast (SINGULAIR) 10 MG tablet Take 1 tablet by mouth nightly      traZODone (DESYREL) 50 MG tablet Take 150 mg by mouth nightly       primidone (MYSOLINE) 250 MG tablet Take 750 mg by mouth nightly       QUEtiapine (SEROQUEL) 400 MG tablet Take 800 mg by mouth nightly      atorvastatin (LIPITOR) 20 MG tablet Take 1 tablet by mouth daily      zinc 50 MG CAPS Take by mouth      Melatonin 10 MG TABS Take 20 mg by mouth nightly           Physical Examination       The following were examined and determined to be normal: Psych/Neuro, Scalp/hair, Head/face, Conjunctivae/eyelids, Gums/teeth/lips, Neck, Breast/axilla/chest, Abdomen, Back, RUE and LUE. The following were examined and determined to be abnormal: RLE, LLE and Genitalia/groin/buttocks. Well appearing. 1.  Right buttock and posterior thighs - few round scaly erythematous patches. 2.  Right proximal inner thigh with 2 nodules - smaller and not visibly draining. Assessment and Plan     1. Nummular dermatitis - markedly improved/nearly clear    Clobetasol cream twice daily to remaining areas until improved. Thicker cream or ointment based moisturizer daily to help with prevention. 2. Hidradenitis suppurativa - improved with doxycycline    Observe.         --Viraj Marquez MD

## 2020-11-17 ENCOUNTER — PATIENT MESSAGE (OUTPATIENT)
Dept: DERMATOLOGY | Age: 75
End: 2020-11-17

## 2020-11-17 NOTE — TELEPHONE ENCOUNTER
From: Zaira Flanagan  To: Edwin Robles MD  Sent: 11/17/2020 8:44 AM EST  Subject: Prescription Question    Dr Angus Juarez, I have gone through the 28 day treatment of the antibiotic you prescribed. There has been no change in the size of the bump in my bum. Do you think we could try a less expensive antibiotic?   Martha Joy

## 2020-12-03 ENCOUNTER — HOSPITAL ENCOUNTER (OUTPATIENT)
Age: 75
Discharge: HOME | End: 2020-12-03
Payer: MEDICARE

## 2020-12-03 ENCOUNTER — ANTI-COAG VISIT (OUTPATIENT)
Dept: PHARMACY | Age: 75
End: 2020-12-03
Payer: MEDICARE

## 2020-12-03 LAB
A/G RATIO: 1.3 (ref 1.1–2.2)
ALBUMIN SERPL-MCNC: 4.1 G/DL (ref 3.4–5)
ALP BLD-CCNC: 127 U/L (ref 40–129)
ALT SERPL-CCNC: 21 U/L (ref 10–40)
ANION GAP SERPL CALCULATED.3IONS-SCNC: 10 MMOL/L (ref 3–16)
AST SERPL-CCNC: 19 U/L (ref 15–37)
BASOPHILS ABSOLUTE: 0.1 K/UL (ref 0–0.2)
BASOPHILS RELATIVE PERCENT: 0.8 %
BILIRUB SERPL-MCNC: 0.3 MG/DL (ref 0–1)
BUN BLDV-MCNC: 6 MG/DL (ref 7–20)
CALCIUM SERPL-MCNC: 9.5 MG/DL (ref 8.3–10.6)
CHLORIDE BLD-SCNC: 103 MMOL/L (ref 99–110)
CHOLESTEROL, TOTAL: 173 MG/DL (ref 0–199)
CO2: 26 MMOL/L (ref 21–32)
CREAT SERPL-MCNC: 0.6 MG/DL (ref 0.8–1.3)
EOSINOPHILS ABSOLUTE: 0.3 K/UL (ref 0–0.6)
EOSINOPHILS RELATIVE PERCENT: 3.4 %
ESTIMATED AVERAGE GLUCOSE: 82.5 MG/DL
GFR AFRICAN AMERICAN: >60
GFR NON-AFRICAN AMERICAN: >60
GLOBULIN: 3.1 G/DL
GLUCOSE BLD-MCNC: 101 MG/DL (ref 70–99)
HBA1C MFR BLD: 4.5 %
HCT VFR BLD CALC: 38.1 % (ref 40.5–52.5)
HDLC SERPL-MCNC: 65 MG/DL (ref 40–60)
HEMOGLOBIN: 12.6 G/DL (ref 13.5–17.5)
INTERNATIONAL NORMALIZATION RATIO, POC: 2.1
LDL CHOLESTEROL CALCULATED: 98 MG/DL
LYMPHOCYTES ABSOLUTE: 1.4 K/UL (ref 1–5.1)
LYMPHOCYTES RELATIVE PERCENT: 19 %
MCH RBC QN AUTO: 25.8 PG (ref 26–34)
MCHC RBC AUTO-ENTMCNC: 33 G/DL (ref 31–36)
MCV RBC AUTO: 78.2 FL (ref 80–100)
MONOCYTES ABSOLUTE: 0.7 K/UL (ref 0–1.3)
MONOCYTES RELATIVE PERCENT: 9 %
NEUTROPHILS ABSOLUTE: 5.1 K/UL (ref 1.7–7.7)
NEUTROPHILS RELATIVE PERCENT: 67.8 %
PDW BLD-RTO: 31.5 % (ref 12.4–15.4)
PLATELET # BLD: 363 K/UL (ref 135–450)
PMV BLD AUTO: 8.2 FL (ref 5–10.5)
POTASSIUM SERPL-SCNC: 4.7 MMOL/L (ref 3.5–5.1)
RBC # BLD: 4.87 M/UL (ref 4.2–5.9)
SODIUM BLD-SCNC: 139 MMOL/L (ref 136–145)
TOTAL PROTEIN: 7.2 G/DL (ref 6.4–8.2)
TRIGL SERPL-MCNC: 48 MG/DL (ref 0–150)
VITAMIN D 25-HYDROXY: 46.9 NG/ML
VLDLC SERPL CALC-MCNC: 10 MG/DL
WBC # BLD: 7.6 K/UL (ref 4–11)

## 2020-12-03 PROCEDURE — 80061 LIPID PANEL: CPT

## 2020-12-03 PROCEDURE — 36415 COLL VENOUS BLD VENIPUNCTURE: CPT

## 2020-12-03 PROCEDURE — 83036 HEMOGLOBIN GLYCOSYLATED A1C: CPT

## 2020-12-03 PROCEDURE — 85025 COMPLETE CBC W/AUTO DIFF WBC: CPT

## 2020-12-03 PROCEDURE — 80053 COMPREHEN METABOLIC PANEL: CPT

## 2020-12-03 PROCEDURE — 82306 VITAMIN D 25 HYDROXY: CPT

## 2020-12-03 PROCEDURE — 85610 PROTHROMBIN TIME: CPT

## 2020-12-03 PROCEDURE — 99211 OFF/OP EST MAY X REQ PHY/QHP: CPT

## 2020-12-03 NOTE — PROGRESS NOTES
Mr. Briana Coats is a 67 y.o. y/o male with history of Afib   He presents today for anticoagulation monitoring and adjustment. Pertinent PMH: COPD, CHF, HTN  Patient has his lap band adjusted occasioanly which will continue to affect his diet. Patient Reported Findings:  Yes     No  [x]   []       Patient verifies current dosing regimen as listed- patient cuts out calendar and puts it on top of his pill box and follows day by day- says he follows the AVS- has been taking 5mg Monday and 7.5mg al lother days     []   [x]       S/S bleeding/bruising/swelling/SOB- denies   []   [x]       Blood in urine or stool- denies   []   [x]       Procedures scheduled in the future at this time  []   [x]       Miss ed dose denies    []   [x]       Extra dose- denies   []   [x]       Change in medications -was on pain meds, done now--> more tylenol recently --> is having iron infusions --> prevacid --> no changes--> doxycycline 100mg bid 10/14-11/8, starting hydroxyzine pamoate next week --> no changes   []   [x]       Change in health/diet/appetite He has had  green beans 2 x last week and has had broccoli 4 times a week which is an increase. He will also have mixed vegetables depending on other food available on the menu. (tani slaw, green beans, corn). He tries to keep Vit K intake consistent, but sometimes has trouble. --->has had less greens than normal ---> no vit k--> no change, no NVD  []   [x]       Change in alcohol use- denies   []   [x]       Change in activity  []   [x]       Hospital admission  []   [x]       Emergency department visit  []   [x]       Other complaints   []   [x]       Tobacco use  Stopped smoking as of Wed 11/28/19. Former smoked 2 1/2 ppd.     Clinical Outcomes:  Yes     No  []   [x]       Major bleeding event  []   [x]       Thromboembolic event  Uses a pillbox  Duration of warfarin Therapy: indefinitely  INR Range:  2.0-3.0     INR 2.1 today      Continue weekly dose of 7.5 mg daily.  Encouraged patient to remain consistent with vit k intake.   Recheck INR in 4 weeks, 1/4    **Patient does NOT want a yellow highlighter**     Referring cardiologist: Dr. Marquis Funk  INR (no units)   Date Value   12/03/2020 2.1   11/05/2020 2.0   10/08/2020 2   10/02/2020 1.03   09/03/2020 3.2   07/31/2020 1.05   11/27/2019 5.16 ([place])   09/10/2018 1.7     CLINICAL PHARMACY CONSULT: MED RECONCILIATION/REVIEW ADDENDUM    For Pharmacy Admin Tracking Only    PHSO: No  Total # of Interventions Recommended: 0  - Maintenance Safety Lab Monitoring #: 1  Total Interventions Accepted: 0  Time Spent (min): 15    Phoebe Godfrey, BolaD

## 2021-01-04 ENCOUNTER — ANTI-COAG VISIT (OUTPATIENT)
Dept: PHARMACY | Age: 76
End: 2021-01-04
Payer: MEDICARE

## 2021-01-04 VITALS — TEMPERATURE: 97.1 F

## 2021-01-04 DIAGNOSIS — I48.91 ATRIAL FIBRILLATION, UNSPECIFIED TYPE (HCC): ICD-10-CM

## 2021-01-04 LAB — INTERNATIONAL NORMALIZATION RATIO, POC: 2

## 2021-01-04 PROCEDURE — 99211 OFF/OP EST MAY X REQ PHY/QHP: CPT

## 2021-01-04 PROCEDURE — 85610 PROTHROMBIN TIME: CPT

## 2021-01-04 NOTE — PROGRESS NOTES
Mr. Judy Gold is a 67 y.o. y/o male with history of Afib   He presents today for anticoagulation monitoring and adjustment. Pertinent PMH: COPD, CHF, HTN  Patient has his lap band adjusted occasioanly which will continue to affect his diet. Patient Reported Findings:  Yes     No  [x]   []       Patient verifies current dosing regimen as listed- patient cuts out calendar and puts it on top of his pill box and follows day by day- says he follows the AVS- has been taking 5mg Monday and 7.5mg al lother days     []   [x]       S/S bleeding/bruising/swelling/SOB- denies   []   [x]       Blood in urine or stool- denies   []   [x]       Procedures scheduled in the future at this time  []   [x]       Miss ed dose denies    []   [x]       Extra dose- denies   []   [x]       Change in medications -was on pain meds, done now--> more tylenol recently --> is having iron infusions --> prevacid --> no changes--> doxycycline 100mg bid 10/14-11/8, starting hydroxyzine pamoate next week --> no changes   []   [x]       Change in health/diet/appetite He has had  green beans 2 x last week and has had broccoli 4 times a week which is an increase. He will also have mixed vegetables depending on other food available on the menu. (tani slaw, green beans, corn). He tries to keep Vit K intake consistent, but sometimes has trouble. --->has had less greens than normal ---> no vit k--> no change, no NVD  []   [x]       Change in alcohol use- denies   []   [x]       Change in activity  []   [x]       Hospital admission  []   [x]       Emergency department visit  []   [x]       Other complaints   []   [x]       Tobacco use  Stopped smoking as of Wed 11/28/19. Former smoked 2 1/2 ppd. Clinical Outcomes:  Yes     No  []   [x]       Major bleeding event  []   [x]       Thromboembolic event  Uses a pillbox  Duration of warfarin Therapy: indefinitely  INR Range:  2.0-3.0     INR 2 today      Continue weekly dose of 7.5 mg daily. Encouraged patient to remain consistent with vit k intake.   Recheck INR in 4 weeks, 2/4    **Patient does NOT want a yellow highlighter**     Referring cardiologist: Dr. Ama Verduzco  INR (no units)   Date Value   12/03/2020 2.1   11/05/2020 2.0   10/08/2020 2   10/02/2020 1.03   09/03/2020 3.2   07/31/2020 1.05   11/27/2019 5.16 ([facility])   09/10/2018 1.7     CLINICAL PHARMACY CONSULT: MED RECONCILIATION/REVIEW ADDENDUM    For Pharmacy Admin Tracking Only    PHSO: No  Total # of Interventions Recommended: 0  - Maintenance Safety Lab Monitoring #: 1  Total Interventions Accepted: 0  Time Spent (min): 15    Raphael Stark, BolaD

## 2021-02-04 ENCOUNTER — ANTI-COAG VISIT (OUTPATIENT)
Dept: PHARMACY | Age: 76
End: 2021-02-04
Payer: MEDICARE

## 2021-02-04 VITALS — TEMPERATURE: 97.1 F

## 2021-02-04 DIAGNOSIS — I48.91 ATRIAL FIBRILLATION, UNSPECIFIED TYPE (HCC): ICD-10-CM

## 2021-02-04 LAB — INTERNATIONAL NORMALIZATION RATIO, POC: 2.8

## 2021-02-04 PROCEDURE — 85610 PROTHROMBIN TIME: CPT

## 2021-02-04 PROCEDURE — 99211 OFF/OP EST MAY X REQ PHY/QHP: CPT

## 2021-02-04 NOTE — PROGRESS NOTES
Mr. Bud Haas is a 67 y.o. y/o male with history of Afib   He presents today for anticoagulation monitoring and adjustment. Pertinent PMH: COPD, CHF, HTN  Patient has his lap band adjusted occasioanly which will continue to affect his diet. Patient Reported Findings:  Yes     No  [x]   []       Patient verifies current dosing regimen as listed- patient cuts out calendar and puts it on top of his pill box and follows day by day- says he follows the AVS- has been taking 5mg Monday and 7.5mg al lother days     []   [x]       S/S bleeding/bruising/swelling/SOB- denies   []   [x]       Blood in urine or stool- denies   []   [x]       Procedures scheduled in the future at this time  []   [x]       Miss ed dose denies    []   [x]       Extra dose- denies   [x]   []       Change in medications -was on pain meds, done now--> more tylenol recently --> is having iron infusions   []   [x]       Change in health/diet/appetite He has had  green beans 2 x last week and has had broccoli 4 times a week which is an increase. He will also have mixed vegetables depending on other food available on the menu. (tani slaw, green beans, corn). He tries to keep Vit K intake consistent, but sometimes has trouble. --->has had less greens than normal ---> no vit k--> no change, no NVD  []   [x]       Change in alcohol use- denies   []   [x]       Change in activity  []   [x]       Hospital admission  []   [x]       Emergency department visit  []   [x]       Other complaints   []   [x]       Tobacco use  Stopped smoking as of Wed 11/28/19. Former smoked 2 1/2 ppd. Clinical Outcomes:  Yes     No  []   [x]       Major bleeding event  []   [x]       Thromboembolic event  Uses a pillbox  Duration of warfarin Therapy: indefinitely  INR Range:  2.0-3.0     INR 2.8 today      Continue weekly dose of 7.5 mg daily. Encouraged patient to remain consistent with vit k intake.   Recheck INR in 4 weeks, 3/4 **Patient does NOT want a yellow highlighter**     Referring cardiologist: Dr. Donald Trevino  INR (no units)   Date Value   01/04/2021 2   12/03/2020 2.1   11/05/2020 2.0   10/08/2020 2   10/02/2020 1.03   07/31/2020 1.05   11/27/2019 5.16 ([facility])   09/10/2018 1.7     CLINICAL PHARMACY CONSULT: MED RECONCILIATION/REVIEW ADDENDUM    For Pharmacy Admin Tracking Only    PHSO: No  Total # of Interventions Recommended: 0  - Maintenance Safety Lab Monitoring #: 1  Total Interventions Accepted: 0  Time Spent (min): 15    Bibiana Simon, PharmD

## 2021-02-05 ENCOUNTER — OFFICE VISIT (OUTPATIENT)
Dept: INTERNAL MEDICINE CLINIC | Age: 76
End: 2021-02-05
Payer: MEDICARE

## 2021-02-05 VITALS
SYSTOLIC BLOOD PRESSURE: 108 MMHG | HEART RATE: 66 BPM | OXYGEN SATURATION: 97 % | BODY MASS INDEX: 33.12 KG/M2 | HEIGHT: 67 IN | DIASTOLIC BLOOD PRESSURE: 60 MMHG | TEMPERATURE: 99 F | WEIGHT: 211 LBS | RESPIRATION RATE: 16 BRPM

## 2021-02-05 DIAGNOSIS — J44.9 CHRONIC OBSTRUCTIVE PULMONARY DISEASE, UNSPECIFIED COPD TYPE (HCC): ICD-10-CM

## 2021-02-05 DIAGNOSIS — R63.4 UNEXPLAINED WEIGHT LOSS: Primary | ICD-10-CM

## 2021-02-05 DIAGNOSIS — E78.5 HYPERLIPIDEMIA, UNSPECIFIED HYPERLIPIDEMIA TYPE: ICD-10-CM

## 2021-02-05 PROCEDURE — G8417 CALC BMI ABV UP PARAM F/U: HCPCS | Performed by: INTERNAL MEDICINE

## 2021-02-05 PROCEDURE — 1123F ACP DISCUSS/DSCN MKR DOCD: CPT | Performed by: INTERNAL MEDICINE

## 2021-02-05 PROCEDURE — G8427 DOCREV CUR MEDS BY ELIG CLIN: HCPCS | Performed by: INTERNAL MEDICINE

## 2021-02-05 PROCEDURE — 4004F PT TOBACCO SCREEN RCVD TLK: CPT | Performed by: INTERNAL MEDICINE

## 2021-02-05 PROCEDURE — 4040F PNEUMOC VAC/ADMIN/RCVD: CPT | Performed by: INTERNAL MEDICINE

## 2021-02-05 PROCEDURE — 99204 OFFICE O/P NEW MOD 45 MIN: CPT | Performed by: INTERNAL MEDICINE

## 2021-02-05 PROCEDURE — G8926 SPIRO NO PERF OR DOC: HCPCS | Performed by: INTERNAL MEDICINE

## 2021-02-05 PROCEDURE — 3023F SPIROM DOC REV: CPT | Performed by: INTERNAL MEDICINE

## 2021-02-05 PROCEDURE — G8482 FLU IMMUNIZE ORDER/ADMIN: HCPCS | Performed by: INTERNAL MEDICINE

## 2021-02-05 RX ORDER — TRAZODONE HYDROCHLORIDE 150 MG/1
300 TABLET ORAL NIGHTLY
Qty: 60 TABLET | Refills: 0
Start: 2021-02-05 | End: 2021-02-25

## 2021-02-05 RX ORDER — FLUOXETINE HYDROCHLORIDE 20 MG/1
60 CAPSULE ORAL DAILY
Qty: 45 CAPSULE | Refills: 0
Start: 2021-02-05

## 2021-02-05 RX ORDER — HYDROXYZINE PAMOATE 25 MG/1
50 CAPSULE ORAL 3 TIMES DAILY
COMMUNITY
Start: 2020-11-04 | End: 2021-05-06

## 2021-02-05 SDOH — HEALTH STABILITY: MENTAL HEALTH: HOW MANY STANDARD DRINKS CONTAINING ALCOHOL DO YOU HAVE ON A TYPICAL DAY?: NOT ASKED

## 2021-02-05 SDOH — ECONOMIC STABILITY: FOOD INSECURITY: WITHIN THE PAST 12 MONTHS, THE FOOD YOU BOUGHT JUST DIDN'T LAST AND YOU DIDN'T HAVE MONEY TO GET MORE.: NOT ASKED

## 2021-02-05 SDOH — HEALTH STABILITY: MENTAL HEALTH: HOW OFTEN DO YOU HAVE A DRINK CONTAINING ALCOHOL?: NOT ASKED

## 2021-02-05 NOTE — PATIENT INSTRUCTIONS
1 month after second covid shot, you can go to pharmacy and get Tdap and Shingrix     PFT's  CT scan abdomen / pelvis    Emmie Nguyentein regarding weight loss

## 2021-02-05 NOTE — PROGRESS NOTES
Delio Campbell (:  1945) is a 67 y.o. male, here for evaluation of the following chief complaint(s):    Established New Doctor      ASSESSMENT/PLAN:  1. Unexplained weight loss  Chart reviewed for causes and nothing nefarious noted so far. Advised seeing nutritionist.  Suggested that his COPD may be worsening. It is possible that his food intake is limited because he relies on just Meals on Wheels. -     CT ABDOMEN PELVIS W IV CONTRAST Additional Contrast? Radiologist Recommendation; Future  -     RENAL FUNCTION PANEL; Future  -     SEDIMENTATION RATE; Future  2. Chronic obstructive pulmonary disease, unspecified COPD type ([facility] [address]  Suggested pulmonary function test to check for progression. Medications refilled. -     Full PFT Study With Bronchodilator; Future  3. Hyperlipidemia -stable on statin  4. HM -we will further address at next visit      Return in about 3 months (around 2021). SUBJECTIVE/OBJECTIVE:  HPI   New patient, get established. Chart extensively reviewed and updated. Overall he feels well except he has noted recent unexplained weight loss. He is down 14 pounds in the past year. His prior PCP was Dr. Marcos Reyes who has moved offices far from his home. He is currently participating in a smoking cessation program at [facility]. He gets annual low-dose chest CTs related to his smoking. He sees his bariatric surgeon for follow-up annually. He states he gets Wixela, albuterol, and atorvastatin and Singulair from his PCP. He gets his other medications from specialists. He sees his cardiologist annually -DR. Lawrence.  On Warfarin mostly due to cost.  He sees a urologist annually- Dr. Joseph Darnell- Neurology-tremor  [facility] - iron infusions  [facility] -hidradenitis  [facility] - bipolar disorder    Review of Systems    Past Medical History:   Diagnosis Date    Allergic rhintis     Seasonal Hay Fever    Anxiety  Aortic stenosis     echo shows mild to mod    Asthma     mixed w copd    Atrial fibrillation (HCC)     takes warfarin due to cost, cardioversion 8/4/11, 7/1/11, 2/16/11, 9/23/10    Atrial flutter (HCC)     status post ablation-jack hare    Bipolar 1 disorder (HCC)     Chronic insomnia     Colon polyps     on colonoscopy    COPD (chronic obstructive pulmonary disease) (HCC)     Depression     [facility]    Essential tremor     dr Roverto Delgado    GERD (gastroesophageal reflux disease)     weaned off lansoprazole    Hiatal hernia     small    Hidradenitis suppurativa     perirectal    history of alcoholism     sober since 2008    Hyperlipidemia     Hypertension     Insomnia     Microcytic anemia 10/14/2010    iron infusions [place]    Obstructive sleep apnea     on bipap 17/11resolved 3 years ago    Seizures ([facility] [address]     1973-One occurance. Unknown etiology. On Dilantin 1 year. Current Outpatient Medications   Medication Sig Dispense Refill    hydrOXYzine (VISTARIL) 25 MG capsule Take 50 mg by mouth 3 times daily      traZODone (DESYREL) 150 MG tablet Take 2 tablets by mouth nightly 60 tablet 0    FLUoxetine (PROZAC) 20 MG capsule Take 3 capsules by mouth daily 45 capsule 0    fluticasone-salmeterol (ADVAIR) 250-50 MCG/DOSE AEPB Inhale 1 puff into the lungs every 12 hours Rinse mouth after use. 1 Inhaler 5    B Complex-C-Folic Acid (HM VITAMIN B COMPLEX/VITAMIN C PO) Take by mouth daily      Cholecalciferol (VITAMIN D3) 50 MCG (2000 UT) CAPS Take 4,000 Units by mouth daily       albuterol sulfate  (90 Base) MCG/ACT inhaler Inhale 2 puffs into the lungs every 6 hours as needed for Wheezing      warfarin (COUMADIN) 5 MG tablet Take 1.5 tablets by mouth daily Take 7.5mg daily.  135 tablet 2    montelukast (SINGULAIR) 10 MG tablet Take 1 tablet by mouth nightly      primidone (MYSOLINE) 250 MG tablet Take 750 mg by mouth nightly  QUEtiapine (SEROQUEL) 400 MG tablet Take 800 mg by mouth nightly      atorvastatin (LIPITOR) 20 MG tablet Take 1 tablet by mouth daily      zinc 50 MG CAPS Take by mouth      Melatonin 10 MG TABS Take 20 mg by mouth nightly      clobetasol (TEMOVATE) 0.05 % ointment Apply to affected areas twice daily until improved. 120 g 2    triamcinolone (KENALOG) 0.1 % cream APPLY TO THE RASH ON THE LEGS TWICE DAILY FOR UP TO 2 WEEKS OR UNTIL IMPROVED (Patient taking differently: as needed ) 80 g 0     Current Facility-Administered Medications   Medication Dose Route Frequency Provider Last Rate Last Admin    triamcinolone acetonide (KENALOG) injection 3 mg  3 mg Intramuscular Once Skip MD Jaelyn           Physical Exam  Vitals signs reviewed. Constitutional:       Appearance: Normal appearance. HENT:      Head: Normocephalic and atraumatic. Cardiovascular:      Rate and Rhythm: Normal rate and regular rhythm. Pulmonary:      Effort: Pulmonary effort is normal.      Breath sounds: Normal breath sounds. Musculoskeletal:      Right lower leg: No edema. Left lower leg: No edema. Neurological:      General: No focal deficit present. Mental Status: He is alert and oriented to person, place, and time. Psychiatric:         Mood and Affect: Mood normal.                 An electronic signature was used to authenticate this note.     --Robert Felix MD

## 2021-02-17 ENCOUNTER — OFFICE VISIT (OUTPATIENT)
Dept: PRIMARY CARE CLINIC | Age: 76
End: 2021-02-17
Payer: MEDICARE

## 2021-02-17 DIAGNOSIS — Z01.818 PREOP EXAMINATION: Primary | ICD-10-CM

## 2021-02-17 PROCEDURE — G8428 CUR MEDS NOT DOCUMENT: HCPCS | Performed by: NURSE PRACTITIONER

## 2021-02-17 PROCEDURE — G8417 CALC BMI ABV UP PARAM F/U: HCPCS | Performed by: NURSE PRACTITIONER

## 2021-02-17 PROCEDURE — 99211 OFF/OP EST MAY X REQ PHY/QHP: CPT | Performed by: NURSE PRACTITIONER

## 2021-02-18 LAB — SARS-COV-2, NAA: NOT DETECTED

## 2021-02-23 ENCOUNTER — HOSPITAL ENCOUNTER (OUTPATIENT)
Dept: CT IMAGING | Age: 76
Discharge: HOME | End: 2021-02-23
Payer: MEDICARE

## 2021-02-23 ENCOUNTER — ANTI-COAG VISIT (OUTPATIENT)
Dept: PHARMACY | Age: 76
End: 2021-02-23
Payer: MEDICARE

## 2021-02-23 ENCOUNTER — HOSPITAL ENCOUNTER (OUTPATIENT)
Dept: PULMONOLOGY | Age: 76
Discharge: HOME | End: 2021-02-23
Payer: MEDICARE

## 2021-02-23 ENCOUNTER — HOSPITAL ENCOUNTER (OUTPATIENT)
Age: 76
Discharge: HOME | End: 2021-02-23
Payer: MEDICARE

## 2021-02-23 VITALS — TEMPERATURE: 97.1 F

## 2021-02-23 VITALS — HEART RATE: 74 BPM | OXYGEN SATURATION: 96 % | RESPIRATION RATE: 18 BRPM

## 2021-02-23 DIAGNOSIS — R63.4 UNEXPLAINED WEIGHT LOSS: ICD-10-CM

## 2021-02-23 DIAGNOSIS — I48.91 ATRIAL FIBRILLATION, UNSPECIFIED TYPE (HCC): ICD-10-CM

## 2021-02-23 DIAGNOSIS — J44.9 CHRONIC OBSTRUCTIVE PULMONARY DISEASE, UNSPECIFIED COPD TYPE (HCC): ICD-10-CM

## 2021-02-23 LAB
ALBUMIN SERPL-MCNC: 4 G/DL (ref 3.4–5)
ANION GAP SERPL CALCULATED.3IONS-SCNC: 9 MMOL/L (ref 3–16)
BUN BLDV-MCNC: 13 MG/DL (ref 7–20)
CALCIUM SERPL-MCNC: 9.4 MG/DL (ref 8.3–10.6)
CHLORIDE BLD-SCNC: 102 MMOL/L (ref 99–110)
CO2: 29 MMOL/L (ref 21–32)
CREAT SERPL-MCNC: 0.8 MG/DL (ref 0.8–1.3)
GFR AFRICAN AMERICAN: >60
GFR NON-AFRICAN AMERICAN: >60
GLUCOSE BLD-MCNC: 100 MG/DL (ref 70–99)
INTERNATIONAL NORMALIZATION RATIO, POC: 3.3
PHOSPHORUS: 3.5 MG/DL (ref 2.5–4.9)
POTASSIUM SERPL-SCNC: 4.6 MMOL/L (ref 3.5–5.1)
SEDIMENTATION RATE, ERYTHROCYTE: 39 MM/HR (ref 0–20)
SODIUM BLD-SCNC: 140 MMOL/L (ref 136–145)

## 2021-02-23 PROCEDURE — 6370000000 HC RX 637 (ALT 250 FOR IP): Performed by: INTERNAL MEDICINE

## 2021-02-23 PROCEDURE — 94760 N-INVAS EAR/PLS OXIMETRY 1: CPT

## 2021-02-23 PROCEDURE — 94726 PLETHYSMOGRAPHY LUNG VOLUMES: CPT

## 2021-02-23 PROCEDURE — 74177 CT ABD & PELVIS W/CONTRAST: CPT

## 2021-02-23 PROCEDURE — 85652 RBC SED RATE AUTOMATED: CPT

## 2021-02-23 PROCEDURE — 94729 DIFFUSING CAPACITY: CPT

## 2021-02-23 PROCEDURE — 99211 OFF/OP EST MAY X REQ PHY/QHP: CPT

## 2021-02-23 PROCEDURE — 85610 PROTHROMBIN TIME: CPT

## 2021-02-23 PROCEDURE — 94060 EVALUATION OF WHEEZING: CPT

## 2021-02-23 PROCEDURE — 80069 RENAL FUNCTION PANEL: CPT

## 2021-02-23 PROCEDURE — 6360000004 HC RX CONTRAST MEDICATION: Performed by: INTERNAL MEDICINE

## 2021-02-23 PROCEDURE — 36415 COLL VENOUS BLD VENIPUNCTURE: CPT

## 2021-02-23 PROCEDURE — 94200 LUNG FUNCTION TEST (MBC/MVV): CPT

## 2021-02-23 RX ORDER — ALBUTEROL SULFATE 90 UG/1
4 AEROSOL, METERED RESPIRATORY (INHALATION) ONCE
Status: COMPLETED | OUTPATIENT
Start: 2021-02-23 | End: 2021-02-23

## 2021-02-23 RX ADMIN — IOHEXOL 50 ML: 240 INJECTION, SOLUTION INTRATHECAL; INTRAVASCULAR; INTRAVENOUS; ORAL at 10:04

## 2021-02-23 RX ADMIN — Medication 4 PUFF: at 11:15

## 2021-02-23 RX ADMIN — IOPAMIDOL 75 ML: 755 INJECTION, SOLUTION INTRAVENOUS at 10:04

## 2021-02-23 NOTE — PROGRESS NOTES
Mr. Yoselin Max is a 67 y.o. y/o male with history of Afib   He presents today for anticoagulation monitoring and adjustment. Pertinent PMH: COPD, CHF, HTN  Patient has his lap band adjusted occasioanly which will continue to affect his diet. Patient Reported Findings:  Yes     No  [x]   []       Patient verifies current dosing regimen as listed- patient cuts out calendar and puts it on top of his pill box and follows day by day- says he follows the AVS- has been taking 5mg Monday and 7.5mg al lother days     []   [x]       S/S bleeding/bruising/swelling/SOB- denies   []   [x]       Blood in urine or stool- denies   []   [x]       Procedures scheduled in the future at this time  []   [x]       Missed dose denies    []   [x]       Extra dose- denies   [x]   []       Change in medications -was on pain meds, done now--> more tylenol recently --> is having iron infusions--> increase vistaril 50mg TID   []   [x]       Change in health/diet/appetite He has had  green beans 2 x last week and has had broccoli 4 times a week which is an increase. He will also have mixed vegetables depending on other food available on the menu. (tani slaw, green beans, corn). He tries to keep Vit K intake consistent, but sometimes has trouble. --->has had less greens than normal ---> no vit k--> no change, no NVD--> diarrhea for about a week  []   [x]       Change in alcohol use- denies   []   [x]       Change in activity  []   [x]       Hospital admission  []   [x]       Emergency department visit  []   [x]       Other complaints   []   [x]       Tobacco use  Stopped smoking as of Wed 11/28/19. Former smoked 2 1/2 ppd. Clinical Outcomes:  Yes     No  []   [x]       Major bleeding event  []   [x]       Thromboembolic event  Uses a pillbox  Duration of warfarin Therapy: indefinitely  INR Range:  2.0-3.0     INR 3.3 today  D/t diarrhea for the past week. Pt says it is better today.     Take 2.5mg tonight then continue weekly dose of 7.5 mg daily. Encouraged patient to remain consistent with vit k intake.   Recheck INR in 3 weeks, 3/15    **Patient does NOT want a yellow highlighter**     Referring cardiologist: Dr. Conner Trejo  INR (no units)   Date Value   02/23/2021 3.3   02/04/2021 2.8   01/04/2021 2   12/03/2020 2.1   10/02/2020 1.03   07/31/2020 1.05   11/27/2019 5.16 ([facility])   09/10/2018 1.7     CLINICAL PHARMACY CONSULT: MED RECONCILIATION/REVIEW ADDENDUM    For Pharmacy Admin Tracking Only    PHSO: No  Total # of Interventions Recommended: 1  - Decreased Dose #: 1  - Maintenance Safety Lab Monitoring #: 1  Total Interventions Accepted: 1  Time Spent (min): Eloy Mcdermott, PharmD

## 2021-02-24 LAB
DLCO %PRED: 87 %
DLCO PRED: NORMAL
DLCO/VA %PRED: NORMAL
DLCO/VA PRED: NORMAL
DLCO/VA: NORMAL
DLCO: NORMAL
EXPIRATORY TIME-POST: NORMAL
EXPIRATORY TIME: NORMAL
FEF 25-75% %CHNG: NORMAL
FEF 25-75% %PRED-POST: NORMAL
FEF 25-75% %PRED-PRE: NORMAL
FEF 25-75% PRED: NORMAL
FEF 25-75%-POST: NORMAL
FEF 25-75%-PRE: NORMAL
FEV1 %PRED-POST: 77 %
FEV1 %PRED-PRE: 76 %
FEV1 PRED: NORMAL
FEV1-POST: NORMAL
FEV1-PRE: NORMAL
FEV1/FVC %PRED-POST: NORMAL
FEV1/FVC %PRED-PRE: NORMAL
FEV1/FVC PRED: NORMAL
FEV1/FVC-POST: 67 %
FEV1/FVC-PRE: 63 %
FVC %PRED-POST: NORMAL
FVC %PRED-PRE: NORMAL
FVC PRED: NORMAL
FVC-POST: NORMAL
FVC-PRE: NORMAL
GAW %PRED: NORMAL
GAW PRED: NORMAL
GAW: NORMAL
IC %PRED: NORMAL
IC PRED: NORMAL
IC: NORMAL
MEP: NORMAL
MIP: NORMAL
MVV %PRED-PRE: NORMAL
MVV PRED: NORMAL
MVV-PRE: NORMAL
PEF %PRED-POST: NORMAL
PEF %PRED-PRE: NORMAL
PEF PRED: NORMAL
PEF%CHNG: NORMAL
PEF-POST: NORMAL
PEF-PRE: NORMAL
RAW %PRED: NORMAL
RAW PRED: NORMAL
RAW: NORMAL
RV %PRED: NORMAL
RV PRED: NORMAL
RV: NORMAL
SVC %PRED: NORMAL
SVC PRED: NORMAL
SVC: NORMAL
TLC %PRED: 113 %
TLC PRED: NORMAL
TLC: NORMAL
VA %PRED: NORMAL
VA PRED: NORMAL
VA: NORMAL
VTG %PRED: NORMAL
VTG PRED: NORMAL
VTG: NORMAL

## 2021-02-24 ASSESSMENT — PULMONARY FUNCTION TESTS
FEV1/FVC_POST: 67
FEV1_PERCENT_PREDICTED_PRE: 76
FEV1/FVC_PRE: 63
FEV1_PERCENT_PREDICTED_POST: 77

## 2021-02-24 NOTE — PROCEDURES
Pulmonary Function Testing      Patient name:  Jenny Alcantar     [facility] Unit #:   [id number]   Date of test: 2/23/2021  Date of interpretation:   2/24/2021    Mr. Jenny Alcantar is a 67y.o. year-old current smoker. The spirometry data were acceptable and reproducible. Spirometry:  Flow volume loops were obstructed. The FEV-1/FVC ratio was decreased. The  post-bronchodilator FEV-1 was 2.15 liters (77% of predicted), which was moderately decreased. The FVC was 3.20 liters (83% of predicted), which was decreased. Response to inhaled bronchodilators (albuterol) was not significant. Lung volumes:  Lung volumes were tested by plethysmography. The total lung capacity was 6.69 liters (113% of predicted), which was normal. The residual volume was 3.36 liters (134% of predicted), which was increased. The ratio of residual volume to total lung capacity (RV/TLC) was 117, which was normal. Specific airway resistance was increased. Diffusion capacity was found to be normal.       Interpretation:  Obstructive airway disease with normal diffusion capacity.

## 2021-02-25 DIAGNOSIS — R93.89 ABNORMAL FINDING ON IMAGING: Primary | ICD-10-CM

## 2021-02-25 RX ORDER — TIOTROPIUM BROMIDE 18 UG/1
18 CAPSULE ORAL; RESPIRATORY (INHALATION) DAILY
Qty: 30 CAPSULE | Refills: 3 | Status: SHIPPED | OUTPATIENT
Start: 2021-02-25 | End: 2021-05-11

## 2021-02-25 RX ORDER — TRAZODONE HYDROCHLORIDE 150 MG/1
150 TABLET ORAL NIGHTLY
Qty: 30 TABLET | Refills: 0
Start: 2021-02-25 | End: 2021-04-20 | Stop reason: ALTCHOICE

## 2021-03-15 ENCOUNTER — ANTI-COAG VISIT (OUTPATIENT)
Dept: PHARMACY | Age: 76
End: 2021-03-15
Payer: MEDICARE

## 2021-03-15 DIAGNOSIS — I48.91 ATRIAL FIBRILLATION, UNSPECIFIED TYPE (HCC): ICD-10-CM

## 2021-03-15 LAB — INTERNATIONAL NORMALIZATION RATIO, POC: 5.7

## 2021-03-15 PROCEDURE — 85610 PROTHROMBIN TIME: CPT

## 2021-03-15 PROCEDURE — 99212 OFFICE O/P EST SF 10 MIN: CPT

## 2021-03-16 ENCOUNTER — TELEPHONE (OUTPATIENT)
Dept: PHARMACY | Age: 76
End: 2021-03-16

## 2021-03-16 NOTE — TELEPHONE ENCOUNTER
Pt called to state that his procedure is scheduled for 4/5. He is going to start holding warfarin 3/29. Was authorized by MD. R/s pt to RTC 1 week after procedure 4/12.

## 2021-04-12 ENCOUNTER — ANTI-COAG VISIT (OUTPATIENT)
Dept: PHARMACY | Age: 76
End: 2021-04-12
Payer: MEDICARE

## 2021-04-12 DIAGNOSIS — I48.91 ATRIAL FIBRILLATION, UNSPECIFIED TYPE (HCC): ICD-10-CM

## 2021-04-12 LAB — INTERNATIONAL NORMALIZATION RATIO, POC: 2.9

## 2021-04-12 PROCEDURE — 99212 OFFICE O/P EST SF 10 MIN: CPT

## 2021-04-12 PROCEDURE — 85610 PROTHROMBIN TIME: CPT

## 2021-04-12 NOTE — PROGRESS NOTES
Tobacco use  Stopped smoking as of Wed 11/28/19. Former smoked 2 1/2 ppd. Clinical Outcomes:  Yes     No  []   [x]       Major bleeding event  []   [x]       Thromboembolic event  Uses a pillbox  Duration of warfarin Therapy: indefinitely  INR Range:  2.0-3.0     INR is 2.9 today  Dose decreased at last visit, was due back 3/29. Procedure 4/5- had to hold starting 3/29 so then was RS for 4/12. Resumed 4/7 and took 10mg then 7.5mg daily. Patient states will do decreased dose from last time and refused to return to clinic sooner or do further dose adjustments. Explained risks. Take weekly dose of 5 mg on Mon and Thurs and 7.5 mg all other days of the week. Encouraged patient to remain consistent with vit k intake.   Recheck INR in 3 weeks, 5/3- refused sooner, when explained risks and asked to reconsider stated \"well then I will have to cancel all my apts so I can't come in any sooner than 5/3, I have doc apts every day until May\"    **Patient does NOT want a yellow highlighter** <---- refused AVS, wanted apt card only    Referring cardiologist: Dr. Alta Enriquez  INR (no units)   Date Value   04/12/2021 2.9   03/15/2021 5.7   02/23/2021 3.3   02/04/2021 2.8   10/02/2020 1.03   07/31/2020 1.05   11/27/2019 5.16 ([facility])   09/10/2018 1.7     CLINICAL PHARMACY CONSULT: MED RECONCILIATION/REVIEW ADDENDUM    For Pharmacy Admin Tracking Only    PHSO: No  Total # of Interventions Recommended: 2  - Decreased Dose #: 1  - Refills Provided #: 1  - Maintenance Safety Lab Monitoring #: 1  Total Interventions Accepted: 2  Time Spent (min): [address], PharmD

## 2021-04-15 LAB
ALBUMIN SERPL-MCNC: 3.8 G/DL
ALP BLD-CCNC: 99 U/L
ALT SERPL-CCNC: 30 U/L
ANION GAP SERPL CALCULATED.3IONS-SCNC: 9 MMOL/L
AST SERPL-CCNC: 28 U/L
AVERAGE GLUCOSE: NORMAL
BASOPHILS ABSOLUTE: NORMAL
BASOPHILS RELATIVE PERCENT: NORMAL
BILIRUB SERPL-MCNC: 0.6 MG/DL (ref 0.1–1.4)
BUN BLDV-MCNC: 14 MG/DL
CALCIUM SERPL-MCNC: 9.7 MG/DL
CHLORIDE BLD-SCNC: 104 MMOL/L
CHOLESTEROL, TOTAL: 197 MG/DL
CHOLESTEROL/HDL RATIO: NORMAL
CO2: 28 MMOL/L
CREAT SERPL-MCNC: 0.17 MG/DL
EOSINOPHILS ABSOLUTE: NORMAL
EOSINOPHILS RELATIVE PERCENT: NORMAL
FERRITIN: 155.7 NG/ML (ref 18–300)
GFR CALCULATED: >90
GLUCOSE BLD-MCNC: 95 MG/DL
HBA1C MFR BLD: 5.6 %
HCT VFR BLD CALC: 47.4 % (ref 41–53)
HDLC SERPL-MCNC: 59 MG/DL (ref 35–70)
HEMOGLOBIN: 15.5 G/DL (ref 13.5–17.5)
LDL CHOLESTEROL CALCULATED: 116 MG/DL (ref 0–160)
LYMPHOCYTES ABSOLUTE: NORMAL
LYMPHOCYTES RELATIVE PERCENT: NORMAL
MAGNESIUM: 1.8 MG/DL
MCH RBC QN AUTO: 28.3 PG
MCHC RBC AUTO-ENTMCNC: 32.6 G/DL
MCV RBC AUTO: 86.7 FL
MONOCYTES ABSOLUTE: NORMAL
MONOCYTES RELATIVE PERCENT: NORMAL
NEUTROPHILS ABSOLUTE: NORMAL
NEUTROPHILS RELATIVE PERCENT: NORMAL
NONHDLC SERPL-MCNC: NORMAL MG/DL
PDW BLD-RTO: 26.9 %
PLATELET # BLD: 371 K/ΜL
PMV BLD AUTO: 8.1 FL
POTASSIUM SERPL-SCNC: 5 MMOL/L
RBC # BLD: 5.47 10^6/ΜL
SODIUM BLD-SCNC: 141 MMOL/L
TOTAL PROTEIN: 6.6
TRIGL SERPL-MCNC: 110 MG/DL
VITAMIN D 25-HYDROXY: 90.2
VITAMIN D2, 25 HYDROXY: NORMAL
VITAMIN D3,25 HYDROXY: NORMAL
VLDLC SERPL CALC-MCNC: NORMAL MG/DL
WBC # BLD: 12.4 10^3/ML

## 2021-04-19 PROBLEM — E66.9 OBESITY (BMI 30.0-34.9): Status: ACTIVE | Noted: 2021-04-19

## 2021-04-19 PROBLEM — E66.811 OBESITY (BMI 30.0-34.9): Status: ACTIVE | Noted: 2021-04-19

## 2021-04-19 NOTE — PROGRESS NOTES
[facility]   Electrophysiology Follow up   Date: 4/20/2021  I had the privilege of visiting Rock Dunne in the office. CC: one year follow up  HPI: Rock Dunne is a 67 y.o. male with a PMH of Afib/flutter, CHF, COPD, HTN, and ASHELY on bipap. He underwent RFA  Of SVT 8/5/2011 and  CryoAblation  SVT and Atrial Fibrillation 9/1//2011    On 9/3/15 RFA of atrial fibrillation and pulmonary veins re-isolation was performed. Since then he has remained in sinus rhythm    He was morbidly obese and lost significant weight after weight loss surgery. Ann Marie Paz presents today in follow up. States he is doing well. Patient denies lightheadedness, dizziness, chest pain, palpitations, orthopnea, edema, presyncope or syncope. He continues smoking. Obstructive airway disease with normal diffusion capacity. Follows with his PCP    He has lost 35lbs in the last year for unknown reason. [facility] Assessment and plan:     Paroxysmal Atrial fibrillation   EKG  Today Sinus rhythm    S/P Cryoablation 09/1/2011   RFA and redo PVI in 09/03/2015    Patient has a [id number]-[id number] Score of 4 (age 1, HTN, CHF)   On Coumadin - follows with [facility]      Experienced bradycardia and hypotension on sotalol- stopped 2014     Continue current medications. HTN  -Controlled  -BP goal <130/80  -Home BP monitoring encouraged, printed information provided on how to accurately measure BP at home.  -Counseled to follow a low salt diet to assure blood pressure remains controlled for cardiovascular risk factor modification.   -The patient is counseled to get regular exercise 3-5 times per week and maintain a healthy weight reduce cardiovascular risk factors. History of obstructive sleep apnea   Was On Bi Pap- resolved after weight loss    Aortic stenosis:    Mild to moderate   Follow up with echo. History of morbid obesity: Body mass index is 29.68 kg/m². .   Lap Band 2014    Smoking:    Continues smoking. Discussed smoking cessation.     - COPD:    PFT with obstructive airway. Follows up with his PCP. Patient Active Problem List    Diagnosis Date Noted    Obesity (BMI 30.0-34.9) 04/19/2021    LAP-BAND surgery status 12/16/2016    H/O hyperlipidemia 11/01/2016    Severe obesity (BMI 35.0-39. 9) with comorbidity ([facility] [address] 11/01/2016    Symptomatic cholelithiasis     Fourth degree hemorrhoids     Restless leg syndrome 02/03/2014    Seborrheic dermatitis 07/25/2013    Obstructive sleep apnea 06/24/2013    Hidradenitis suppurativa     S/P gastric bypass 08/01/2012    RBBB (right bundle branch block) 06/21/2011    Chronic insomnia 12/08/2010    Asthma 09/24/2010    CHF (congestive heart failure) ([facility] [address] 08/05/2010    Atrial fibrillation 08/02/2010    COPD (chronic obstructive pulmonary disease) (HCC)     Major depressive disorder ([facility] [address]     Hypertension      Diagnostic studies:   EKG today is sinus rhythm     PFT 02/23/2021     Interpretation:  Obstructive airway disease with normal diffusion capacity.     Echo: 8/5/19   Conclusions     Summary   Normal left ventricle size and systolic function with an estimated ejection   fraction of 55-60%. There is concentric left ventricular hypertrophy. Normal   diastolic function.   Mitral annular calcification is present. Trivial mitral regurgitation.   The left atrium is dilated.   Aortic valve leaflets appear sclerotic. Mild to moderate aortic stenosis with   a peak velocity of 287m/s and a mean pressure gradient of 22mmHg.   Mild tricuspid regurgitation with PASP of 21 mmHg.        Stress test: Lexiscan 2/13/17   Summary   There is normal isotope uptake at stress and rest. There is no evidence of   myocardial ischemia or scar.   Normal LV function.   Overall findings represent a low risk scan.       I independently reviewed the cardiac diagnostic studies, ECG and relevant imaging studies.      Lab Results   Component Value Date    LVEF 58 08/05/2019     Lab Results   Component Value Date    TSH 1.09 07/05/2013         Physical Examination:  Vitals:    04/20/21 1105   BP: 106/68   Pulse: 64   SpO2: 96%      Wt Readings from Last 3 Encounters:   04/20/21 195 lb 3.2 oz (88.5 kg)   02/05/21 211 lb (95.7 kg)   10/02/20 228 lb (103.4 kg)       · Constitutional: Oriented. No distress. · Head: Normocephalic and atraumatic. · Mouth/Throat: Oropharynx is clear and moist.   · Eyes: Conjunctivae normal. EOM are normal.   · Neck: Neck supple. No JVD present. · Cardiovascular: Normal rate, regular rhythm, R9&K8.  + systolic murmur   · Pulmonary/Chest: Decreased respiratory sounds. No rhonchi. · Abdominal: Soft. No tenderness. · Musculoskeletal: No tenderness. No edema    · Lymphadenopathy: Has no cervical adenopathy. · Neurological: Alert and oriented. Follows command, No Gross deficit   · Skin: Skin is warm, No rash noted. · Psychiatric: Has a normal behavior       Review of System:  [x] Full ROS obtained and negative except as mentioned in HPI    Prior to Admission medications    Medication Sig Start Date End Date Taking?  Authorizing Provider   Dominick Savage 250-50 MCG/DOSE AEPB Inhale 1 puff into the lungs every 12 hours 3/25/21  Yes Pancho Yung MD   tiotropium (SPIRIVA HANDIHALER) 18 MCG inhalation capsule Inhale 1 capsule into the lungs daily 2/25/21  Yes Pancho Yung MD   hydrOXYzine (VISTARIL) 25 MG capsule Take 50 mg by mouth 3 times daily 11/4/20  Yes Historical Provider, MD   FLUoxetine (PROZAC) 20 MG capsule Take 3 capsules by mouth daily 2/5/21  Yes Pancho Yung MD   B Complex-C-Folic Acid (HM VITAMIN B COMPLEX/VITAMIN C PO) Take by mouth daily   Yes Historical Provider, MD   Cholecalciferol (VITAMIN D3) 50 MCG (2000 UT) CAPS Take 4,000 Units by mouth daily    Yes Historical Provider, MD   albuterol sulfate  (90 Base) MCG/ACT inhaler Inhale 2 puffs into the lungs every 6 hours as needed for Wheezing   Yes Historical Provider, MD   montelukast (SINGULAIR) 10 MG tablet Take 1 tablet by mouth nightly 1/21/19  Yes Historical Provider, MD   primidone (MYSOLINE) 250 MG tablet Take 750 mg by mouth nightly    Yes Historical Provider, MD   QUEtiapine (SEROQUEL) 400 MG tablet Take 800 mg by mouth nightly   Yes Historical Provider, MD   atorvastatin (LIPITOR) 20 MG tablet Take 1 tablet by mouth daily 6/20/16  Yes Historical Provider, MD   zinc 50 MG CAPS Take by mouth   Yes Historical Provider, MD   Melatonin 10 MG TABS Take 20 mg by mouth nightly   Yes Historical Provider, MD   warfarin (COUMADIN) 5 MG tablet Take 1.5 tablets by mouth daily Take 7.5mg daily. 1/6/20 2/5/21  Layo Gerardo MD       No Known Allergies    Social History:  Reviewed. reports that he has been smoking. He started smoking about 61 years ago. He has a 150.00 pack-year smoking history. He has never used smokeless tobacco. He reports previous alcohol use. He reports that he does not use drugs. Family History:  Reviewed. Reviewed. No family history of SCD. Relevant and available labs, and cardiovascular diagnostics reviewed. Reviewed. I independently reviewed relevant and available cardiac diagnostic tests ECG, CXR, Echo, Stress test, Device interrogation, Holter, CT scan. Outside medical records via Care everywhere reviewed and summarized in H&P above. Complex medical condition with multiple medical problems affecting prognosis and outcome of EP interventions    - The patient is counseled to follow a low salt diet to assure blood pressure remains controlled for cardiovascular risk factor modification.   - The patient is counseled to avoid excess caffeine, and energy drinks as this may exacerbated ectopy and arrhythmia. - The patient is counseled to get regular exercise 3-5 times per week to control cardiovascular risk factors.    - The patient is counseled to lose weigt to control cardiovascular risk factors. - The patient is counseled to avoid tobacco use. All questions and concerns were addressed to the patient/family. Alternatives to my treatment were discussed. I have discussed the above stated plan and the patient verbalized understanding and agreed with the plan. Scribe attestation: This note was scribed in the presence of Lucy Childress MD by Les Escobar RN  Physician Attestation: I, Dr. Lucy Childress, confirm that the scribe's documentation has been prepared under my direction and personally reviewed by me in its entirety. I also confirm that the note above accurately reflects all work, treatment, procedures, and medical decision making performed by me. NOTE: This report was transcribed using voice recognition software. Every effort was made to ensure accuracy, however, inadvertent computerized transcription errors may be present.      Lucy Childress MD, MPH  [place] [id number]   Office: [phone]  Fax: [phone]

## 2021-04-20 ENCOUNTER — OFFICE VISIT (OUTPATIENT)
Dept: CARDIOLOGY CLINIC | Age: 76
End: 2021-04-20
Payer: MEDICARE

## 2021-04-20 VITALS
HEART RATE: 64 BPM | HEIGHT: 68 IN | WEIGHT: 195.2 LBS | OXYGEN SATURATION: 96 % | BODY MASS INDEX: 29.58 KG/M2 | DIASTOLIC BLOOD PRESSURE: 68 MMHG | SYSTOLIC BLOOD PRESSURE: 106 MMHG

## 2021-04-20 DIAGNOSIS — I48.91 ATRIAL FIBRILLATION, UNSPECIFIED TYPE (HCC): ICD-10-CM

## 2021-04-20 DIAGNOSIS — E66.9 OBESITY (BMI 30.0-34.9): ICD-10-CM

## 2021-04-20 DIAGNOSIS — I10 ESSENTIAL HYPERTENSION: Primary | ICD-10-CM

## 2021-04-20 DIAGNOSIS — G47.33 OBSTRUCTIVE SLEEP APNEA: ICD-10-CM

## 2021-04-20 PROCEDURE — G8427 DOCREV CUR MEDS BY ELIG CLIN: HCPCS | Performed by: INTERNAL MEDICINE

## 2021-04-20 PROCEDURE — 93000 ELECTROCARDIOGRAM COMPLETE: CPT | Performed by: INTERNAL MEDICINE

## 2021-04-20 PROCEDURE — G8417 CALC BMI ABV UP PARAM F/U: HCPCS | Performed by: INTERNAL MEDICINE

## 2021-04-20 PROCEDURE — 99214 OFFICE O/P EST MOD 30 MIN: CPT | Performed by: INTERNAL MEDICINE

## 2021-04-20 PROCEDURE — 4040F PNEUMOC VAC/ADMIN/RCVD: CPT | Performed by: INTERNAL MEDICINE

## 2021-04-20 PROCEDURE — 4004F PT TOBACCO SCREEN RCVD TLK: CPT | Performed by: INTERNAL MEDICINE

## 2021-04-20 PROCEDURE — 1123F ACP DISCUSS/DSCN MKR DOCD: CPT | Performed by: INTERNAL MEDICINE

## 2021-04-28 RX ORDER — PRIMIDONE 250 MG/1
750 TABLET ORAL NIGHTLY
Qty: 90 TABLET | Refills: 0
Start: 2021-04-28 | End: 2021-04-28

## 2021-05-03 ENCOUNTER — ANTI-COAG VISIT (OUTPATIENT)
Dept: PHARMACY | Age: 76
End: 2021-05-03
Payer: MEDICARE

## 2021-05-03 ENCOUNTER — TELEPHONE (OUTPATIENT)
Dept: INTERNAL MEDICINE CLINIC | Age: 76
End: 2021-05-03

## 2021-05-03 DIAGNOSIS — I48.91 ATRIAL FIBRILLATION, UNSPECIFIED TYPE (HCC): ICD-10-CM

## 2021-05-03 LAB — INTERNATIONAL NORMALIZATION RATIO, POC: 2.7

## 2021-05-03 PROCEDURE — 85610 PROTHROMBIN TIME: CPT

## 2021-05-03 PROCEDURE — 99211 OFF/OP EST MAY X REQ PHY/QHP: CPT

## 2021-05-03 NOTE — PROGRESS NOTES
Mr. Patsy Hussein is a 67 y.o. y/o male with history of Afib   He presents today for anticoagulation monitoring and adjustment. Pertinent PMH: COPD, CHF, HTN  Patient has his lap band adjusted occasioanly which will continue to affect his diet. Patient Reported Findings:  Yes     No  [x]   []       Patient verifies current dosing regimen as listed- patient cuts out calendar and puts it on top of his pill box and follows day by day- says he follows the AVS---> confirmed dose then got confused about what he took last night, stated followed AVS   []   [x]       S/S bleeding/bruising/swelling/SOB- denies   []   [x]       Blood in urine or stool- denies---> black stool after starting iron, has apt this week, doc aware    [x]   []       Procedures scheduled in the future at this time upcoming procedure doesn't have date. Needs to stop warfarin 7 days prior and 3 days after, per pt ok from Dr. Leonardo Kim already---> had procedure 4/5 and held for 7 days before and then resumed 4/7    []   [x]       Missed dose denies    []   [x]       Extra dose- denies   [x]   []       Change in medications -was on pain meds, done now--> more tylenol recently --> is having iron infusions--> increase vistaril 50mg TID --> has been taking 1500 mg 1-2 times daily for headaches---> continues with tylenol---> vistaril inc, iron added     [x]   []       Change in health/diet/appetite He has had  green beans 2 x last week and has had broccoli 4 times a week which is an increase. He will also have mixed vegetables depending on other food available on the menu. (tani slaw, green beans, corn). He tries to keep Vit K intake consistent, but sometimes has trouble.  --->has had less greens than normal ---> no vit k--> no change, no NVD--> diarrhea for about a week --> no vit k, still having diarrhea (believes it is from vaccine from 2 weeks ago)---> no greens, no appetite, lost a lot of weight, no NVD---> denies, no NVD  []   [x]       Change in alcohol use- denies   []   [x]       Change in activity  []   [x]       Hospital admission  []   [x]       Emergency department visit  []   [x]       Other complaints   []   [x]       Tobacco use  Stopped smoking as of Wed 11/28/19. Former smoked 2 1/2 ppd. Clinical Outcomes:  Yes     No  []   [x]       Major bleeding event  []   [x]       Thromboembolic event  Uses a pillbox  Duration of warfarin Therapy: indefinitely  INR Range:  2.0-3.0     INR is 2.7 today  Continue taking dose of 5 mg on Mon and Thurs and 7.5 mg all other days of the week   Encouraged patient to remain consistent with vit k intake.   Recheck INR in 4 weeks, 6/1- refused sooner than 4 weeks    **Patient does NOT want a yellow highlighter** <---- refused AVS, wanted apt card only    Referring cardiologist: Dr. Sergio Jones  INR (no units)   Date Value   05/03/2021 2.7   04/12/2021 2.9   03/15/2021 5.7   02/23/2021 3.3   10/02/2020 1.03   07/31/2020 1.05   11/27/2019 5.16 ([place])   09/10/2018 1.7     For Pharmacy Admin Tracking Only     Total # of Interventions Recommended: 0   Total # of Interventions Accepted: 0   Time Spent (min): 15

## 2021-05-03 NOTE — TELEPHONE ENCOUNTER
Spoke to patient who states his INR was on the lower side of his range today. He had a normal hemoglobin about 2 weeks ago. He does not feel dizzy or lightheaded. He will continue to monitor. He has a visit with me later this week but I told him to go to ER if he sees blood in his stool.

## 2021-05-03 NOTE — TELEPHONE ENCOUNTER
Patient called stating that he has been having very dark colored stool but he is not in any pain or having any other symptoms. He wanted to make sure her called to inform the Dr. Laura Penaloza he does have an appointment with his hematologist on Thursday. And he feels like he can wait until then to be seen But if things change he will call back.

## 2021-05-06 ENCOUNTER — TELEPHONE (OUTPATIENT)
Dept: INTERNAL MEDICINE CLINIC | Age: 76
End: 2021-05-06

## 2021-05-06 ENCOUNTER — OFFICE VISIT (OUTPATIENT)
Dept: INTERNAL MEDICINE CLINIC | Age: 76
End: 2021-05-06
Payer: MEDICARE

## 2021-05-06 VITALS
DIASTOLIC BLOOD PRESSURE: 50 MMHG | HEART RATE: 54 BPM | TEMPERATURE: 98.6 F | SYSTOLIC BLOOD PRESSURE: 98 MMHG | OXYGEN SATURATION: 97 % | BODY MASS INDEX: 29.19 KG/M2 | RESPIRATION RATE: 16 BRPM | WEIGHT: 192 LBS

## 2021-05-06 DIAGNOSIS — I50.9 CONGESTIVE HEART FAILURE, UNSPECIFIED HF CHRONICITY, UNSPECIFIED HEART FAILURE TYPE (HCC): ICD-10-CM

## 2021-05-06 DIAGNOSIS — Z00.00 ROUTINE GENERAL MEDICAL EXAMINATION AT A HEALTH CARE FACILITY: Primary | ICD-10-CM

## 2021-05-06 DIAGNOSIS — Z11.59 SCREENING FOR VIRAL DISEASE: ICD-10-CM

## 2021-05-06 DIAGNOSIS — Z86.39 H/O HYPERLIPIDEMIA: ICD-10-CM

## 2021-05-06 DIAGNOSIS — J44.9 CHRONIC OBSTRUCTIVE PULMONARY DISEASE, UNSPECIFIED COPD TYPE (HCC): ICD-10-CM

## 2021-05-06 DIAGNOSIS — L73.2 HIDRADENITIS SUPPURATIVA: ICD-10-CM

## 2021-05-06 DIAGNOSIS — R19.5 DARK STOOLS: ICD-10-CM

## 2021-05-06 DIAGNOSIS — I48.91 ATRIAL FIBRILLATION, UNSPECIFIED TYPE (HCC): ICD-10-CM

## 2021-05-06 PROCEDURE — 1123F ACP DISCUSS/DSCN MKR DOCD: CPT | Performed by: INTERNAL MEDICINE

## 2021-05-06 PROCEDURE — 4040F PNEUMOC VAC/ADMIN/RCVD: CPT | Performed by: INTERNAL MEDICINE

## 2021-05-06 PROCEDURE — G8417 CALC BMI ABV UP PARAM F/U: HCPCS | Performed by: INTERNAL MEDICINE

## 2021-05-06 PROCEDURE — G8926 SPIRO NO PERF OR DOC: HCPCS | Performed by: INTERNAL MEDICINE

## 2021-05-06 PROCEDURE — 3023F SPIROM DOC REV: CPT | Performed by: INTERNAL MEDICINE

## 2021-05-06 PROCEDURE — G0439 PPPS, SUBSEQ VISIT: HCPCS | Performed by: INTERNAL MEDICINE

## 2021-05-06 PROCEDURE — 99214 OFFICE O/P EST MOD 30 MIN: CPT | Performed by: INTERNAL MEDICINE

## 2021-05-06 PROCEDURE — 4004F PT TOBACCO SCREEN RCVD TLK: CPT | Performed by: INTERNAL MEDICINE

## 2021-05-06 PROCEDURE — G8427 DOCREV CUR MEDS BY ELIG CLIN: HCPCS | Performed by: INTERNAL MEDICINE

## 2021-05-06 RX ORDER — TRAZODONE HYDROCHLORIDE 150 MG/1
150 TABLET ORAL EVERY EVENING
COMMUNITY
Start: 2021-04-28

## 2021-05-06 RX ORDER — CEPHALEXIN 500 MG/1
500 CAPSULE ORAL 3 TIMES DAILY
Qty: 21 CAPSULE | Refills: 0 | Status: SHIPPED | OUTPATIENT
Start: 2021-05-06 | End: 2021-05-13

## 2021-05-06 RX ORDER — HYDROXYZINE PAMOATE 25 MG/1
50 CAPSULE ORAL 4 TIMES DAILY PRN
Qty: 240 CAPSULE | Refills: 0
Start: 2021-05-06 | End: 2021-05-10

## 2021-05-06 ASSESSMENT — PATIENT HEALTH QUESTIONNAIRE - PHQ9
SUM OF ALL RESPONSES TO PHQ QUESTIONS 1-9: 0
2. FEELING DOWN, DEPRESSED OR HOPELESS: 0
SUM OF ALL RESPONSES TO PHQ QUESTIONS 1-9: 0
SUM OF ALL RESPONSES TO PHQ9 QUESTIONS 1 & 2: 0
SUM OF ALL RESPONSES TO PHQ QUESTIONS 1-9: 0

## 2021-05-06 ASSESSMENT — LIFESTYLE VARIABLES: HOW OFTEN DO YOU HAVE A DRINK CONTAINING ALCOHOL: 0

## 2021-05-06 ASSESSMENT — ENCOUNTER SYMPTOMS
SHORTNESS OF BREATH: 1
ABDOMINAL PAIN: 0

## 2021-05-06 NOTE — PATIENT INSTRUCTIONS
Warm compresses  Keflex    Dermatology or surgery if not better    Labs    Personalized Preventive Plan for Kvng Flanagan - 5/6/2021  Medicare offers a range of preventive health benefits. Some of the tests and screenings are paid in full while other may be subject to a deductible, co-insurance, and/or copay. Some of these benefits include a comprehensive review of your medical history including lifestyle, illnesses that may run in your family, and various assessments and screenings as appropriate. After reviewing your medical record and screening and assessments performed today your provider may have ordered immunizations, labs, imaging, and/or referrals for you. A list of these orders (if applicable) as well as your Preventive Care list are included within your After Visit Summary for your review. Other Preventive Recommendations:    · A preventive eye exam performed by an eye specialist is recommended every 1-2 years to screen for glaucoma; cataracts, macular degeneration, and other eye disorders. · A preventive dental visit is recommended every 6 months. · Try to get at least 150 minutes of exercise per week or 10,000 steps per day on a pedometer . · Order or download the FREE \"Exercise & Physical Activity: Your Everyday Guide\" from [employer]. Call 4-391.689.2770 or search [employer] online. · You need 6448-4700 mg of calcium and 9200-5906 IU of vitamin D per day. It is possible to meet your calcium requirement with diet alone, but a vitamin D supplement is usually necessary to meet this goal.  · When exposed to the sun, use a sunscreen that protects against both UVA and UVB radiation with an SPF of 30 or greater. Reapply every 2 to 3 hours or after sweating, drying off with a towel, or swimming. · Always wear a seat belt when traveling in a car. Always wear a helmet when riding a bicycle or motorcycle.

## 2021-05-06 NOTE — TELEPHONE ENCOUNTER
Spiriva Respimat may be easier .  Turn, Open,  Press--- rather than  the inhalation device of the Spiriva handihaler because he wont have to remove capsule from pkg and place in chamber of inhalation device  I am forwarding to pharmacy clinical group to get their input as well

## 2021-05-06 NOTE — TELEPHONE ENCOUNTER
Please advise if there is easier to manage (with tremor) formulation of Spiriva than the one last dispensed.

## 2021-05-06 NOTE — PROGRESS NOTES
Bianca Chavarria (:  1945) is a 67 y.o. male, here for evaluation of the following chief complaint(s):    Medicare AWV (Discuss labs. Daily headaches relieved with Tylenol. \"Pimple\" left buttock area, painful at times.)      ASSESSMENT/PLAN:  1. Routine general medical examination at a health care facility  AWV below  2. Congestive heart failure, unspecified HF chronicity, unspecified heart failure type [facility])  This is appears to be resolved/improved  Summary Echo    Normal left ventricle size and systolic function with an estimated ejection   fraction of 55-60%. There is concentric left ventricular hypertrophy. Normal   diastolic function. Mitral annular calcification is present. Trivial mitral regurgitation. The left atrium is dilated. Aortic valve leaflets appear sclerotic. Mild to moderate aortic stenosis with   a peak velocity of 287m/s and a mean pressure gradient of 22mmHg. Mild tricuspid regurgitation with PASP of 21 mmHg. 3. Atrial fibrillation, unspecified type ([address]  Stable on Warfarin, as per his Cardiologist  \"He underwent RFA  Of SVT 2011 and  CryoAblation  SVT and Atrial Fibrillation 2011  On 9/3/15 RFA of atrial fibrillation and pulmonary veins re-isolation was performed. Since then he has remained in sinus rhythm\"  4. Chronic obstructive pulmonary disease, unspecified COPD type ([address]  Reviewed PFT's. Again counseled to quit smoking. Stable on Wixela, will try to find Spiriva that he can mechanistically operate. 5. Dark stools  -     CBC; Future. Recently taken off iron infusions for anemia due to malabsorption after bariatric surgery. 6. Hidradenitis suppurativa  Lower buttocks and upper thighs-Advised Keflex. Due to Warfarin, avoiding Bactrim and Doxycycline. Warm compresses and see Dermatology if not improving. 7. Screening for viral disease  -     HEPATITIS C ANTIBODY; Future  8. H/O hyperlipidemia  On statin  9. Unexplained weight loss  Has stabilized. Depression     [facility]    Essential tremor     dr uNria Ernst GERD (gastroesophageal reflux disease)     weaned off lansoprazole    Hiatal hernia     small, ugi-esophageal dysmotility    Hidradenitis suppurativa     perirectal    history of alcoholism     sober since 2008    Hyperlipidemia     Hypertension     Insomnia     Microcytic anemia 10/14/2010    iron infusions [facility], now resolved, due to malabsorption    Obstructive sleep apnea     on bipap 17/11resolved 3 years ago    Prostate nodule     dr Cassi mAbrose, urology    Seizures [facility])     1973-One occurance. Unknown etiology. On Dilantin 1 year.  Tobacco abuse     annual chest ct in may    Tremor     primidone as per  neurology       Current Outpatient Medications   Medication Sig Dispense Refill    cephALEXin (KEFLEX) 500 MG capsule Take 1 capsule by mouth 3 times daily for 7 days 21 capsule 0    hydrOXYzine (VISTARIL) 25 MG capsule Take 2 capsules by mouth 4 times daily as needed for Itching 240 capsule 0    WIXELA INHUB 250-50 MCG/DOSE AEPB Inhale 1 puff into the lungs every 12 hours 60 each 3    FLUoxetine (PROZAC) 20 MG capsule Take 3 capsules by mouth daily 45 capsule 0    B Complex-C-Folic Acid (HM VITAMIN B COMPLEX/VITAMIN C PO) Take by mouth daily      Cholecalciferol (VITAMIN D3) 50 MCG (2000 UT) CAPS Take 4,000 Units by mouth daily       albuterol sulfate  (90 Base) MCG/ACT inhaler Inhale 2 puffs into the lungs every 6 hours as needed for Wheezing      warfarin (COUMADIN) 5 MG tablet Take 1.5 tablets by mouth daily Take 7.5mg daily.  135 tablet 2    montelukast (SINGULAIR) 10 MG tablet Take 1 tablet by mouth nightly      primidone (MYSOLINE) 250 MG tablet Take 750 mg by mouth nightly       QUEtiapine (SEROQUEL) 400 MG tablet Take 800 mg by mouth nightly      atorvastatin (LIPITOR) 20 MG tablet Take 1 tablet by mouth daily      zinc 50 MG CAPS Take by mouth      Melatonin 10 MG TABS Take 20 mg by mouth nightly  traZODone (DESYREL) 150 MG tablet Take 150 mg by mouth every evening      tiotropium (SPIRIVA HANDIHALER) 18 MCG inhalation capsule Inhale 1 capsule into the lungs daily 30 capsule 3     Current Facility-Administered Medications   Medication Dose Route Frequency Provider Last Rate Last Admin    triamcinolone acetonide (KENALOG) injection 3 mg  3 mg Intramuscular Once Estelita Nuñez MD           Physical Exam  Vitals signs reviewed. Constitutional:       Appearance: Normal appearance. HENT:      Head: Normocephalic and atraumatic. Cardiovascular:      Rate and Rhythm: Normal rate and regular rhythm. Pulmonary:      Effort: Pulmonary effort is normal.      Breath sounds: Normal breath sounds. Abdominal:      General: Abdomen is flat. Palpations: Abdomen is soft. Musculoskeletal:      Right lower leg: No edema. Left lower leg: No edema. Skin:     Comments: Induration, erythema typical of hidradenitis in the gluteal folds and upper inner thighs   Neurological:      General: No focal deficit present. Mental Status: He is alert and oriented to person, place, and time. Psychiatric:         Mood and Affect: Mood normal.           On this date 5/6/2021 I have spent 30 minutes reviewing previous notes, test results and face to face with the patient discussing the diagnosis and importance of compliance with the treatment plan as well as documenting on the day of the visit. This note was generated completely or in part utilizing Dragon dictation speech recognition software. Occasionally, words are mistranscribed and despite editing, the text may contain inaccuracies due to incorrect word recognition. If further clarification is needed please contact the office at [phone]          An electronic signature was used to authenticate this note.     --Rachel Warner MD     Medicare Annual Wellness Visit  Name: Paco Valdivia Date: 5/6/2021   MRN: [id number] Sex: Male   Age: 67 y.o. Ethnicity: Non-/Non    : 1945 Race: Darrell Flanagan is here for Medicare AWV (Discuss labs. Daily headaches relieved with Tylenol. \"Pimple\" left buttock area, painful at times.)    Screenings for behavioral, psychosocial and functional/safety risks, and cognitive dysfunction are all negative except as indicated below. These results, as well as other patient data from the [facility] [place], operated by [facility] form, are documented in Flowsheets linked to this Encounter. No Known Allergies      Prior to Visit Medications    Medication Sig Taking? Authorizing Provider   cephALEXin (KEFLEX) 500 MG capsule Take 1 capsule by mouth 3 times daily for 7 days Yes Salma Patel MD   hydrOXYzine (VISTARIL) 25 MG capsule Take 2 capsules by mouth 4 times daily as needed for Itching Yes Salma Patel MD   WIXELA INHUB 250-50 MCG/DOSE AEPB Inhale 1 puff into the lungs every 12 hours Yes Salma Patel MD   FLUoxetine (PROZAC) 20 MG capsule Take 3 capsules by mouth daily Yes Salma Patel MD   B Complex-C-Folic Acid (HM VITAMIN B COMPLEX/VITAMIN C PO) Take by mouth daily Yes Historical Provider, MD   Cholecalciferol (VITAMIN D3) 50 MCG (2000 UT) CAPS Take 4,000 Units by mouth daily  Yes Historical Provider, MD   albuterol sulfate  (90 Base) MCG/ACT inhaler Inhale 2 puffs into the lungs every 6 hours as needed for Wheezing Yes Historical Provider, MD   warfarin (COUMADIN) 5 MG tablet Take 1.5 tablets by mouth daily Take 7.5mg daily.  Yes Rey Glass MD   montelukast (SINGULAIR) 10 MG tablet Take 1 tablet by mouth nightly Yes Historical Provider, MD   primidone (MYSOLINE) 250 MG tablet Take 750 mg by mouth nightly  Yes Historical Provider, MD   QUEtiapine (SEROQUEL) 400 MG tablet Take 800 mg by mouth nightly Yes Historical Provider, MD   atorvastatin (LIPITOR) 20 MG tablet Take 1 tablet by mouth daily Yes Historical Provider, MD zinc 50 MG CAPS Take by mouth Yes Historical Provider, MD   Melatonin 10 MG TABS Take 20 mg by mouth nightly Yes Historical Provider, MD   traZODone (DESYREL) 150 MG tablet Take 150 mg by mouth every evening  Historical Provider, MD   tiotropium (Traci Emerson) 18 MCG inhalation capsule Inhale 1 capsule into the lungs daily  Malathi Mak MD         Past Medical History:   Diagnosis Date    Allergic rhintis 1959    Seasonal Hay Fever    Anxiety     Aortic stenosis     echo shows mild to mod    Asthma     mixed w copd    Atrial fibrillation ([facility]     takes warfarin due to cost, cardioversion 8/4/11, 7/1/11, 2/16/11, 9/23/10    Atrial flutter (HCC)     status post ablation-jack hare    Bipolar 1 disorder (HCC)     Chronic insomnia     Colon polyps     on colonoscopy    COPD (chronic obstructive pulmonary disease) ([facility]     Coronary artery calcification seen on CAT scan     Depression     [facility]    Essential tremor     dr Swapna Alaniz GERD (gastroesophageal reflux disease)     weaned off lansoprazole    Hiatal hernia     small, ugi-esophageal dysmotility    Hidradenitis suppurativa     perirectal    history of alcoholism     sober since 2008    Hyperlipidemia     Hypertension     Insomnia     Microcytic anemia 10/14/2010    iron infusions [facility], now resolved, due to malabsorption    Obstructive sleep apnea     on bipap 17/11resolved 3 years ago    Prostate nodule     dr Navya Leal, urology    Seizures ([facility]     1973-One occurance. Unknown etiology. On Dilantin 1 year.     Tobacco abuse     annual chest ct in may    Tremor     primidone as per  neurology       Past Surgical History:   Procedure Laterality Date    ABDOMEN SURGERY      gastric band    [place]  2001    [place]    ATRIAL ABLATION SURGERY  08/05/2011    atrial flutter ablation    ATRIAL ABLATION SURGERY  09/01/2011    cryoablation for atrial fibrillation    ATRIAL ABLATION SURGERY  09/03/2015 RFCA for AF with PVI    BARIATRIC SURGERY  08/05/2013    band over bypass    CARDIOVERSION  x4    CATARACT REMOVAL Bilateral 2009    [address], LAPAROSCOPIC N/A 06/20/2016    LAPAROSCOPIC CHOLECYSTECTOMY WITH CHOLANGIOGRAM    COLONOSCOPY  10/01/2009    Keegan, repeat 3 years    COLONOSCOPY N/A 07/31/2020    COLONOSCOPY POLYPECTOMY SNARE/COLD BIOPSY performed by Nikko Wellington MD at [place]      from chest    HEMORRHOID SURGERY  03/23/2016    HERNIA REPAIR      Repaired in conjunction with Band Over Bypass, 2013.  OTHER SURGICAL HISTORY  09/21/2015    excision vivian rectal cyst    PROSTATE BIOPSY  04/2021    neg    DIMITRIS-EN-Y GASTRIC BYPASS  2001    [facility]  [place]    UPPER GASTROINTESTINAL ENDOSCOPY  05/13/2013    Matteo    UPPER GASTROINTESTINAL ENDOSCOPY N/A 07/31/2020    EGD BIOPSY performed by Nikko Wellington MD at [address] [place] [address] ENDOSCOPY N/A 10/02/2020    EGD DIAGNOSTIC ONLY performed by Nikko Wellington MD at [address] [place] [address] ENDOSCOPY  04/2021         Family History   Problem Relation Age of Onset   Yusra Kirby Migraines Mother     Emphysema Mother     Depression Mother         ECT; meds.     Heart Disease Mother         Mitral Valve Replacement    Heart Disease Father     Hypertension Father     Alcohol Abuse Father     High Blood Pressure Father     High Cholesterol Father         Treated with meds    Stroke Father         AHD    Substance Abuse Father         ETOH Abuse       CareTeam (Including outside providers/suppliers regularly involved in providing care):   Patient Care Team:  Azalia Foss MD as PCP - General (Internal Medicine)  Azalia Foss MD as PCP - [facility] Empaneled Provider  Nikko Wellington MD as Consulting Physician (Gastroenterology)  Bassem Mcgee MD as Consulting Physician (Cardiology)  Billee Burkitt Tami Shaikh MD as Consulting Physician (Pulmonology)  Mary Ann Rosas MD as Consulting Physician (Cardiology)  Brinda Lorenzo MD as Consulting Physician (Pulmonology)  Vianey Mcdonnell MD as Consulting Physician (Urology)  Ashanti Barbour MD as Surgeon ([address])  CHRISTOPHER Veloz - CNP as Nurse Practitioner (Nurse Practitioner)  Mary Ann Rosas MD as Surgeon (Urology)  Angelica Spence MD as Consulting Physician (Hematology and Oncology)    [place] Readings from Last 3 Encounters:   05/06/21 192 lb (87.1 kg)   04/20/21 195 lb 3.2 oz (88.5 kg)   02/05/21 211 lb (95.7 kg)     Vitals:    05/06/21 1329   BP: (!) 98/50   Pulse: 54   Resp: 16   Temp: 98.6 °F (37 °C)   TempSrc: Oral   SpO2: 97%   Weight: 192 lb (87.1 kg)     Body mass index is 29.19 kg/m². Based upon direct observation of the patient, evaluation of cognition reveals recent and remote memory intact. Patient's complete Health Risk Assessment and screening values have been reviewed and are found in Flowsheets. The following problems were reviewed today and where indicated follow up appointments were made and/or referrals ordered. Positive Risk Factor Screenings with Interventions:         Substance History:  Social History     Tobacco History     Smoking Status  Current Every Day Smoker Smoking Start Date  6/1/1959 Last attempt to quit  8/2/2009 Smoking Frequency  3 packs/day for 50 years (150 pk yrs)    Smokeless Tobacco Use  Never Used    Tobacco Comment  1 ppd on average, more so during covid          Alcohol History     Alcohol Use Status  Not Currently Comment  rare          Drug Use     Drug Use Status  No          Sexual Activity     Sexually Active  Never               Alcohol Screening:       A score of 8 or more is associated with harmful or hazardous drinking. A score of 13 or more in women, and 15 or more in men, is likely to indicate alcohol dependence.   Substance Abuse Interventions:  · Tobacco abuse:  patient 06/21/2011    Zoster Live (Zostavax) 04/25/2013        Health Maintenance   Topic Date Due    Hepatitis C screen  Never done    Annual Wellness Visit (AWV)  Never done    Low dose CT lung screening  05/19/2021    Shingles Vaccine (3 of 3) 06/02/2021    Lipid screen  12/03/2021    DTaP/Tdap/Td vaccine (2 - Td) 04/07/2031    Flu vaccine  Completed    Pneumococcal 65+ years Vaccine  Completed    COVID-19 Vaccine  Completed    Hepatitis A vaccine  Aged Out    Hepatitis B vaccine  Aged Out    Hib vaccine  Aged Out    Meningococcal (ACWY) vaccine  Aged Out     Recommendations for [employer] Due: see orders and patient instructions/AVS.  . Recommended screening schedule for the next 5-10 years is provided to the patient in written form: see Patient Instructions/AVS.    Rodriguez Rubinstein was seen today for medicare awv. Diagnoses and all orders for this visit:    Routine general medical examination at a health care facility    Congestive heart failure, unspecified HF chronicity, unspecified heart failure type ([address]    Atrial fibrillation, unspecified type ([address]    Chronic obstructive pulmonary disease, unspecified COPD type ([address]    Dark stools  -     CBC; Future    Hidradenitis suppurativa    Screening for viral disease  -     HEPATITIS C ANTIBODY; Future    H/O hyperlipidemia    Other orders  -     cephALEXin (KEFLEX) 500 MG capsule; Take 1 capsule by mouth 3 times daily for 7 days  -     hydrOXYzine (VISTARIL) 25 MG capsule;  Take 2 capsules by mouth 4 times daily as needed for Itching

## 2021-05-06 NOTE — LETTER
[facility]) Physicians [place] [address] [address]  Phone: [phone]  Fax: [phone]    Joselin Otreo MD        May 6, 2021     Patient: Ericka Ramirez   YOB: 1945   Date of Visit: 5/6/2021       To Whom It May Concern: It is my medical opinion that Rocky Rodriguez would benefit from non-slip surface in his bath/shower. If you have any questions or concerns, please don't hesitate to call.     Sincerely,        Joselin Otero MD

## 2021-05-07 NOTE — TELEPHONE ENCOUNTER
Called [facility] re coverage, the Spiriva Respimat and Incruse Ellipta are both covered with $45 copay

## 2021-05-07 NOTE — TELEPHONE ENCOUNTER
Agree with RN, Spiriva Respimat may be easier to use with tremor compared to Handihaler. Of note, it does require a cartridge to be inserted and inhaler must be primed before first use.     Possible alternative LAMA (pending insurance coverage) may be Incruse Ellipta - this device only requires cover to be opened and then dose is ready (not the same but is similar to Americana) and is also administered once daily    Thank you,  Howard Martines, PharmD, [facility]  Department, toll free: [phone], option 7      For Pharmacy Admin Tracking Only     Recommendation Provided To: Provider: 1 via Note to Provider   Gap Closed?: Yes    Intervention Accepted By: Provider: 1   Time Spent (min): 10

## 2021-05-12 ENCOUNTER — TELEPHONE (OUTPATIENT)
Dept: INTERNAL MEDICINE CLINIC | Age: 76
End: 2021-05-12

## 2021-05-12 NOTE — TELEPHONE ENCOUNTER
Spoke with patient to advises and he states Spiriva is to difficult to use, hard to dispense and wants to continue the Americana he is currently taking.

## 2021-05-13 ENCOUNTER — PATIENT MESSAGE (OUTPATIENT)
Dept: INTERNAL MEDICINE CLINIC | Age: 76
End: 2021-05-13

## 2021-05-13 NOTE — TELEPHONE ENCOUNTER
Is there anything that can be done to help with this cost? Can patient decline the package and then it would be sent back?

## 2021-05-13 NOTE — TELEPHONE ENCOUNTER
Hello I really don't know. Only the pharmacy would have that answer. I can call the patient but really he'd have to call the pharmacy to make arrangements.

## 2021-05-13 NOTE — TELEPHONE ENCOUNTER
From: Demetrius Flanagan  To: Joselin Otero MD  Sent: 5/13/2021 8:12 AM EDT  Subject: Non-Urgent Medical Question    A bid mistake was made. An order was placed for a 90 day supply of Spiriva to [facility]. They charged my credit card $130 and now I only have $70 until the end of the month. Please note in my file that orders for inhalers be sent to [facility] for a 30 day supply. The only orders sent to [facility] should be Montelukast and Atorvastatin.  Thank you   Martin

## 2021-05-20 ENCOUNTER — TELEPHONE (OUTPATIENT)
Dept: INTERNAL MEDICINE CLINIC | Age: 76
End: 2021-05-20

## 2021-05-20 RX ORDER — ATORVASTATIN CALCIUM 40 MG/1
40 TABLET, FILM COATED ORAL DAILY
Qty: 90 TABLET | Refills: 0 | Status: SHIPPED | OUTPATIENT
Start: 2021-05-20 | End: 2021-07-12

## 2021-05-20 RX ORDER — ATORVASTATIN CALCIUM 40 MG/1
40 TABLET, FILM COATED ORAL DAILY
Qty: 90 TABLET | Refills: 0
Start: 2021-05-20 | End: 2021-05-20 | Stop reason: SDUPTHER

## 2021-05-20 NOTE — TELEPHONE ENCOUNTER
Tell patient I received a copy of his chest CT and it showed moderate to severe coronary artery calcifications. No new nodules. Repeat Chest CT one year. I would like him to increase his atorvastatin from 20 to 40 mg daily. Does he need a new prescription for this?

## 2021-06-01 ENCOUNTER — ANTI-COAG VISIT (OUTPATIENT)
Dept: PHARMACY | Age: 76
End: 2021-06-01
Payer: MEDICARE

## 2021-06-01 DIAGNOSIS — I48.91 ATRIAL FIBRILLATION, UNSPECIFIED TYPE (HCC): Primary | ICD-10-CM

## 2021-06-01 LAB — INTERNATIONAL NORMALIZATION RATIO, POC: 2.6

## 2021-06-01 PROCEDURE — 99211 OFF/OP EST MAY X REQ PHY/QHP: CPT

## 2021-06-01 PROCEDURE — 85610 PROTHROMBIN TIME: CPT

## 2021-06-01 NOTE — PROGRESS NOTES
Mr. Donna Billingsley is a 67 y.o. y/o male with history of Afib   He presents today for anticoagulation monitoring and adjustment. Pertinent PMH: COPD, CHF, HTN  Patient has his lap band adjusted occasioanly which will continue to affect his diet. Patient Reported Findings:  Yes     No  [x]   []       Patient verifies current dosing regimen as listed- patient cuts out calendar and puts it on top of his pill box and follows day by day- says he follows the AVS---> confirmed dose then got confused about what he took last night, stated followed AVS ---> changed dose   []   [x]       S/S bleeding/bruising/swelling/SOB- denies   []   [x]       Blood in urine or stool- denies---> black stool after starting iron, has apt this week, doc aware--->denies     [x]   []       Procedures scheduled in the future at this time upcoming procedure doesn't have date. Needs to stop warfarin 7 days prior and 3 days after, per pt ok from Dr. Frank Zelaya already---> had procedure 4/5 and held for 7 days before and then resumed 4/7    []   [x]       Missed dose denies    []   [x]       Extra dose- denies   [x]   []       Change in medications -was on pain meds, done now--> more tylenol recently --> is having iron infusions--> increase vistaril 50mg TID --> has been taking 1500 mg 1-2 times daily for headaches---> continues with tylenol---> vistaril inc, iron added ---> was on keflex, done now, inc atorvastatin, vistaril increased       [x]   []       Change in health/diet/appetite He has had  green beans 2 x last week and has had broccoli 4 times a week which is an increase. He will also have mixed vegetables depending on other food available on the menu. ([employer], green beans, corn). He tries to keep Vit K intake consistent, but sometimes has trouble.  --->has had less greens than normal ---> no vit k--> no change, no NVD--> diarrhea for about a week --> no vit k, still having diarrhea (believes it is from vaccine from 2 weeks ago)---> no greens, no appetite, lost a lot of weight, no NVD---> denies, no NVD  []   [x]       Change in alcohol use- denies   []   [x]       Change in activity  []   [x]       Hospital admission  []   [x]       Emergency department visit  []   [x]       Other complaints   []   [x]       Tobacco use  Stopped smoking as of Wed 11/28/19. Former smoked 2 1/2 ppd. Clinical Outcomes:  Yes     No  []   [x]       Major bleeding event  []   [x]       Thromboembolic event  Uses a pillbox  Duration of warfarin Therapy: indefinitely  INR Range:  2.0-3.0     INR is 2.6 today  Pt states he switched his dose from 5mg twice weekly to 3 times weekly because he thinks it was getting high. States switched it a month ago. Will switch to what he has been taking. Pt frequently self adjusts. Continue taking dose of 5 mg on Mon, Wed and Fri and 7.5 mg all other days of the week   Encouraged patient to remain consistent with vit k intake.   Recheck INR in 4 weeks, 6/29    **Patient does NOT want a yellow highlighter** <---- refused AVS, wanted apt card only    Referring cardiologist: Dr. Dov Juarez  INR (no units)   Date Value   06/01/2021 2.6   05/03/2021 2.7   04/12/2021 2.9   03/15/2021 5.7   10/02/2020 1.03   07/31/2020 1.05   11/27/2019 5.16 ([facility])   09/10/2018 1.7     For Pharmacy Admin Tracking Only     Total # of Interventions Recommended: 0   Total # of Interventions Accepted: 0   Time Spent (min): 15

## 2021-06-15 ENCOUNTER — OFFICE VISIT (OUTPATIENT)
Dept: DERMATOLOGY | Age: 76
End: 2021-06-15
Payer: MEDICARE

## 2021-06-15 VITALS — BODY MASS INDEX: 28.98 KG/M2 | TEMPERATURE: 98.1 F | WEIGHT: 190.6 LBS

## 2021-06-15 DIAGNOSIS — L30.0 NUMMULAR DERMATITIS: Primary | ICD-10-CM

## 2021-06-15 DIAGNOSIS — K13.0 ANGULAR CHEILITIS: ICD-10-CM

## 2021-06-15 DIAGNOSIS — L82.1 SK (SEBORRHEIC KERATOSIS): ICD-10-CM

## 2021-06-15 DIAGNOSIS — L73.2 HIDRADENITIS SUPPURATIVA: ICD-10-CM

## 2021-06-15 PROCEDURE — G8427 DOCREV CUR MEDS BY ELIG CLIN: HCPCS | Performed by: DERMATOLOGY

## 2021-06-15 PROCEDURE — 99214 OFFICE O/P EST MOD 30 MIN: CPT | Performed by: DERMATOLOGY

## 2021-06-15 PROCEDURE — 4004F PT TOBACCO SCREEN RCVD TLK: CPT | Performed by: DERMATOLOGY

## 2021-06-15 PROCEDURE — G8417 CALC BMI ABV UP PARAM F/U: HCPCS | Performed by: DERMATOLOGY

## 2021-06-15 PROCEDURE — 1123F ACP DISCUSS/DSCN MKR DOCD: CPT | Performed by: DERMATOLOGY

## 2021-06-15 PROCEDURE — 4040F PNEUMOC VAC/ADMIN/RCVD: CPT | Performed by: DERMATOLOGY

## 2021-06-15 RX ORDER — NYSTATIN 100000 U/G
CREAM TOPICAL
Qty: 15 G | Refills: 2 | Status: SHIPPED | OUTPATIENT
Start: 2021-06-15 | End: 2021-07-08 | Stop reason: ALTCHOICE

## 2021-06-15 NOTE — PROGRESS NOTES
[facility]  Triny Armendariz MD  [phone]      Demetrius Flanagan  [birth year]    67 y.o. male     Date of Visit: 6/15/2021    Chief Complaint: dermatitis, hidradenitis, rash    History of Present Illness:    1. Follow up for chronic nummular dermatitis - has been quiescent lately. Has not needed to use clobetasol cream.    2.  Follow-up for hidradenitis suppurativacomplains of 1 in active intermittently draining lesion on the right proximal inner thigh. 3.  Follow-up for angular cheilitisstill reports that it is present. Reports that he does drool a lot. He reports minimal improvement with nystatin and triamcinolone. 4.  He also complains of a growth on the left lower inguinal region. Review of Systems:  Gen: + for weight loss, recently saw GI    Past Medical History, Family History, Surgical History, Medications and Allergies reviewed.     Past Medical History:   Diagnosis Date    Allergic rhintis 1959    Seasonal Hay Fever    Anxiety     Aortic stenosis     echo shows mild to mod    Asthma     mixed w copd    Atrial fibrillation (HCC)     takes warfarin due to cost, cardioversion 8/4/11, 7/1/11, 2/16/11, 9/23/10    Atrial flutter ([address]     status post ablation-jack hare    Azoospermia     dx'd in teens    Bipolar 1 disorder ([address]     Chronic insomnia     Colon polyps     on colonoscopy    COPD (chronic obstructive pulmonary disease) ([address]     Coronary artery calcification seen on CAT scan     Depression     [facility]    Essential tremor     dr Marely Kirby GERD (gastroesophageal reflux disease)     weaned off lansoprazole    Hiatal hernia     small, ugi-esophageal dysmotility    Hidradenitis suppurativa     perirectal    history of alcoholism     sober since 2008    Hyperlipidemia     Hypertension     Insomnia     Microcytic anemia 10/14/2010    iron infusions [facility], now resolved, due to malabsorption    Obstructive sleep apnea     on bipap 17/11resolved 3 years ago    Prostate nodule     dr Kaylah Granados, urology    Seizures [facility])     1973-One occurance. Unknown etiology. On Dilantin 1 year.  Tobacco abuse     annual chest ct in may    Tremor     [facility]     Past Surgical History:   Procedure Laterality Date    ABDOMEN SURGERY      gastric band    [place]    ATRIAL ABLATION SURGERY  08/05/2011    atrial flutter ablation    ATRIAL ABLATION SURGERY  09/01/2011    cryoablation for atrial fibrillation    ATRIAL ABLATION SURGERY  09/03/2015    RFCA for AF with PVI    BARIATRIC SURGERY  08/05/2013    band over bypass    CARDIOVERSION  x4    CATARACT REMOVAL Bilateral 2009    [facility], LAPAROSCOPIC N/A 06/20/2016    LAPAROSCOPIC CHOLECYSTECTOMY WITH CHOLANGIOGRAM    COLONOSCOPY  10/01/2009    [place], repeat 3 years    COLONOSCOPY N/A 07/31/2020    COLONOSCOPY POLYPECTOMY SNARE/COLD BIOPSY performed by Guido Melvin MD at [place]      from chest    HEMORRHOID SURGERY  03/23/2016    HERNIA REPAIR      Repaired in conjunction with Band Over Bypass, 2013.     OTHER SURGICAL HISTORY  09/21/2015    excision vivian rectal cyst    PROSTATE BIOPSY  04/2021    neg    DIMITRIS-EN-Y GASTRIC BYPASS  [facility]    UPPER GASTROINTESTINAL ENDOSCOPY  05/13/2013    [facility]    UPPER GASTROINTESTINAL ENDOSCOPY N/A 07/31/2020    EGD BIOPSY performed by Guido Melvin MD at [address] N/A 10/02/2020    EGD DIAGNOSTIC ONLY performed by Guido Melvin MD at  [address]  04/2021       No Known Allergies  Outpatient Medications Marked as Taking for the 6/15/21 encounter (Office Visit) with Gisselle Ha MD   Medication Sig Dispense Refill    nystatin (MYCOSTATIN) 103600 UNIT/GM cream Apply topically 2 times daily until

## 2021-06-17 RX ORDER — ALBUTEROL SULFATE 90 UG/1
2 AEROSOL, METERED RESPIRATORY (INHALATION) EVERY 6 HOURS PRN
Qty: 3 INHALER | Refills: 1 | Status: SHIPPED | OUTPATIENT
Start: 2021-06-17 | End: 2021-08-18

## 2021-06-17 NOTE — TELEPHONE ENCOUNTER
Last appointment: 5/6/2021  Next appointment: 8/6/2021  Last refill: Historical refill       Patient recently changed to

## 2021-06-29 ENCOUNTER — ANTI-COAG VISIT (OUTPATIENT)
Dept: PHARMACY | Age: 76
End: 2021-06-29
Payer: MEDICARE

## 2021-06-29 DIAGNOSIS — I48.91 ATRIAL FIBRILLATION, UNSPECIFIED TYPE (HCC): Primary | ICD-10-CM

## 2021-06-29 LAB — INTERNATIONAL NORMALIZATION RATIO, POC: 3.1

## 2021-06-29 PROCEDURE — 85610 PROTHROMBIN TIME: CPT

## 2021-06-29 PROCEDURE — 99212 OFFICE O/P EST SF 10 MIN: CPT

## 2021-06-29 NOTE — PROGRESS NOTES
Mr. Tiffany Harrison is a 67 y.o. y/o male with history of Afib   He presents today for anticoagulation monitoring and adjustment. Pertinent PMH: COPD, CHF, HTN  Patient has his lap band adjusted occasioanly which will continue to affect his diet. Patient Reported Findings:  Yes     No  [x]   []       Patient verifies current dosing regimen as listed- patient cuts out calendar and puts it on top of his pill box and follows day by day- says he follows the AVS---> confirmed dose then got confused about what he took last night, stated followed AVS ---> changed dose   []   [x]       S/S bleeding/bruising/swelling/SOB- denies   []   [x]       Blood in urine or stool- denies---> black stool after starting iron, has apt this week, doc aware--->denies     [x]   []       Procedures scheduled in the future at this time upcoming procedure doesn't have date. Needs to stop warfarin 7 days prior and 3 days after, per pt ok from Dr. Farhad Sheldon already---> had procedure 4/5 and held for 7 days before and then resumed 4/7---> colonoscopy 7/14, states contacted Farhad Sheldon to get clearance- was told to hold starting 7/8 and resume 7/15 and take 10mg, no lovenox      []   [x]       Missed dose denies    []   [x]       Extra dose- denies   [x]   []       Change in medications -was on pain meds, done now--> more tylenol recently --> is having iron infusions--> increase vistaril 50mg TID --> has been taking 1500 mg 1-2 times daily for headaches---> continues with tylenol---> vistaril inc, iron added ---> was on keflex, done now, inc atorvastatin, vistaril increased---> no changes        [x]   []       Change in health/diet/appetite He has had  green beans 2 x last week and has had broccoli 4 times a week which is an increase. He will also have mixed vegetables depending on other food available on the menu. ([employer], green beans, corn). He tries to keep Vit K intake consistent, but sometimes has trouble.  --->has had less greens than normal ---> no vit k--> no change, no NVD--> diarrhea for about a week --> no vit k, still having diarrhea (believes it is from vaccine from 2 weeks ago)---> no greens, no appetite, lost a lot of weight, no NVD---> denies, no NVD---> not many greens, no NVD  []   [x]       Change in alcohol use- denies   []   [x]       Change in activity  []   [x]       Hospital admission  []   [x]       Emergency department visit  []   [x]       Other complaints   []   [x]       Tobacco use  Stopped smoking as of Wed 11/28/19. Former smoked 2 1/2 ppd. Clinical Outcomes:  Yes     No  []   [x]       Major bleeding event  []   [x]       Thromboembolic event  Uses a pillbox  Duration of warfarin Therapy: indefinitely  INR Range:  2.0-3.0     INR is 3.1 today  Pt frequently self adjusts- again self adjusted and said that 7.5mg MWF and 5mg AOD. Continue taking dose of 7.5 mg on Mon, Wed and Fri and 5 mg all other days of the week. Start holding 7/8 per doctor until procedure 7/14, resume warfarin 7/15 per doctor and take 10mg then continue normal dose. Encouraged patient to remain consistent with vit k intake.   Recheck INR in 3 weeks, 7/23- 1 week after procedure     **Patient does NOT want a yellow highlighter** <---- refused AVS, wanted apt card only    Referring cardiologist: Dr. Mary Pires  INR (no units)   Date Value   06/29/2021 3.1   06/01/2021 2.6   05/03/2021 2.7   04/12/2021 2.9   10/02/2020 1.03   07/31/2020 1.05   11/27/2019 5.16 ([facility])   09/10/2018 1.7   For Pharmacy Admin Tracking Only     Intervention Detail: Dose Adjustment: 1, reason: Therapy Optimization and Refill(s) Provided   Total # of Interventions Recommended: 2   Total # of Interventions Accepted: 2   Time Spent (min): 15

## 2021-07-06 RX ORDER — MONTELUKAST SODIUM 10 MG/1
10 TABLET ORAL NIGHTLY
Qty: 90 TABLET | Refills: 1 | Status: SHIPPED | OUTPATIENT
Start: 2021-07-06 | End: 2021-08-09 | Stop reason: SINTOL

## 2021-07-08 RX ORDER — WARFARIN SODIUM 5 MG/1
5 TABLET ORAL
COMMUNITY
End: 2021-10-04

## 2021-07-08 NOTE — PROGRESS NOTES
Patient _X__ reached   _____not reached-preop instructions left on voice mail_____________      DATE__7/14/21______ TIME__1100______ARRIVAL__0930  FEC_______      Nothing to eat or drink after midnight the night before,except for what the prep instructions call for. If you do not have the instructions or do not understand them please contact your doctors office. Follow any instructions your doctors office has given you including what medications to take the AM of your procedure and which ones to hold. You may use your inhalers. If you take a long acting insulin the vince prior please cut the dose in half and take no diabetic medications that AM.Follow specific doctors office instructions regarding blood thinners and if they want you to hold and for how long. If you are on a blood thinner and have no instructions please contact the office and ask-CHECK COUMADIN    Dress comfortably,bring your insurance card,picture ID,and a complete list of medications, including supplements. You must have a responsible adult to stay with you during the procedure,drive you home and stay with you. [facility] phone number [phone] for any questions. OTHER INTRUCTIONS(if applicable)_________________________________________________________      COVID TEST         _____ Done ___ where ____       _____ Scheduled ___ where ____       _____Other_________________       __X_ VACCINATED-instructed to bring card      VISITOR POLICY(subject to change)    There is a one visitor policy at [facility] for all surgeries and endoscopies. Whether the visitor can stay or will be asked to wait in the car will depend on the current policy and if social distancing can be maintained. The policy is subject to change at any time. Please make sure the visitor has a cell phone that is on,charged and able to accept calls, as this may be the way that the staff communicates with them. Pain management is NO VISITOR policyThe patients ride is expected to remain in the car with a cell phone for communication. If the ride is leaving the hospital grounds please make sure they are back in time for pickup. Have the patient inform the staff on arrival what their rides plans are while the patient is in the facility. At the MAIN there is one visitor allowed. Please note that the visitor policy is subject to change.

## 2021-07-14 ENCOUNTER — HOSPITAL ENCOUNTER (OUTPATIENT)
Age: 76
Setting detail: OUTPATIENT SURGERY
Discharge: HOME | End: 2021-07-14
Attending: INTERNAL MEDICINE | Admitting: INTERNAL MEDICINE
Payer: MEDICARE

## 2021-07-14 ENCOUNTER — ANESTHESIA (OUTPATIENT)
Dept: ENDOSCOPY | Age: 76
End: 2021-07-14
Payer: MEDICARE

## 2021-07-14 ENCOUNTER — ANESTHESIA EVENT (OUTPATIENT)
Dept: ENDOSCOPY | Age: 76
End: 2021-07-14
Payer: MEDICARE

## 2021-07-14 VITALS
HEART RATE: 50 BPM | OXYGEN SATURATION: 97 % | TEMPERATURE: 96.7 F | RESPIRATION RATE: 16 BRPM | SYSTOLIC BLOOD PRESSURE: 120 MMHG | BODY MASS INDEX: 28.79 KG/M2 | WEIGHT: 190 LBS | HEIGHT: 68 IN | DIASTOLIC BLOOD PRESSURE: 68 MMHG

## 2021-07-14 VITALS
DIASTOLIC BLOOD PRESSURE: 84 MMHG | RESPIRATION RATE: 16 BRPM | OXYGEN SATURATION: 97 % | SYSTOLIC BLOOD PRESSURE: 114 MMHG

## 2021-07-14 LAB
APTT: 36.1 SEC (ref 26.2–38.6)
INR BLD: 0.93 (ref 0.88–1.12)
PROTHROMBIN TIME: 10.5 SEC (ref 9.9–12.7)

## 2021-07-14 PROCEDURE — 3700000001 HC ADD 15 MINUTES (ANESTHESIA): Performed by: INTERNAL MEDICINE

## 2021-07-14 PROCEDURE — 2500000003 HC RX 250 WO HCPCS: Performed by: INTERNAL MEDICINE

## 2021-07-14 PROCEDURE — 7100000011 HC PHASE II RECOVERY - ADDTL 15 MIN: Performed by: INTERNAL MEDICINE

## 2021-07-14 PROCEDURE — 2709999900 HC NON-CHARGEABLE SUPPLY: Performed by: INTERNAL MEDICINE

## 2021-07-14 PROCEDURE — 6360000002 HC RX W HCPCS: Performed by: NURSE ANESTHETIST, CERTIFIED REGISTERED

## 2021-07-14 PROCEDURE — 2580000003 HC RX 258: Performed by: ANESTHESIOLOGY

## 2021-07-14 PROCEDURE — 2580000003 HC RX 258: Performed by: NURSE ANESTHETIST, CERTIFIED REGISTERED

## 2021-07-14 PROCEDURE — 85730 THROMBOPLASTIN TIME PARTIAL: CPT

## 2021-07-14 PROCEDURE — 85610 PROTHROMBIN TIME: CPT

## 2021-07-14 PROCEDURE — 3700000000 HC ANESTHESIA ATTENDED CARE: Performed by: INTERNAL MEDICINE

## 2021-07-14 PROCEDURE — 7100000010 HC PHASE II RECOVERY - FIRST 15 MIN: Performed by: INTERNAL MEDICINE

## 2021-07-14 PROCEDURE — 2500000003 HC RX 250 WO HCPCS: Performed by: NURSE ANESTHETIST, CERTIFIED REGISTERED

## 2021-07-14 PROCEDURE — 3609010600 HC COLONOSCOPY POLYPECTOMY SNARE/COLD BIOPSY: Performed by: INTERNAL MEDICINE

## 2021-07-14 PROCEDURE — 6370000000 HC RX 637 (ALT 250 FOR IP): Performed by: INTERNAL MEDICINE

## 2021-07-14 PROCEDURE — 88305 TISSUE EXAM BY PATHOLOGIST: CPT

## 2021-07-14 RX ORDER — KETAMINE HYDROCHLORIDE 10 MG/ML
INJECTION, SOLUTION INTRAMUSCULAR; INTRAVENOUS PRN
Status: DISCONTINUED | OUTPATIENT
Start: 2021-07-14 | End: 2021-07-14 | Stop reason: SDUPTHER

## 2021-07-14 RX ORDER — SODIUM CHLORIDE 9 MG/ML
INJECTION, SOLUTION INTRAVENOUS CONTINUOUS
Status: DISCONTINUED | OUTPATIENT
Start: 2021-07-14 | End: 2021-07-14 | Stop reason: HOSPADM

## 2021-07-14 RX ORDER — SODIUM CHLORIDE 9 MG/ML
INJECTION, SOLUTION INTRAVENOUS CONTINUOUS PRN
Status: DISCONTINUED | OUTPATIENT
Start: 2021-07-14 | End: 2021-07-14 | Stop reason: SDUPTHER

## 2021-07-14 RX ORDER — PROPOFOL 10 MG/ML
INJECTION, EMULSION INTRAVENOUS CONTINUOUS PRN
Status: DISCONTINUED | OUTPATIENT
Start: 2021-07-14 | End: 2021-07-14 | Stop reason: SDUPTHER

## 2021-07-14 RX ORDER — LANOLIN ALCOHOL/MO/W.PET/CERES
1000 CREAM (GRAM) TOPICAL DAILY
COMMUNITY

## 2021-07-14 RX ORDER — LIDOCAINE HYDROCHLORIDE 20 MG/ML
INJECTION, SOLUTION EPIDURAL; INFILTRATION; INTRACAUDAL; PERINEURAL PRN
Status: DISCONTINUED | OUTPATIENT
Start: 2021-07-14 | End: 2021-07-14 | Stop reason: SDUPTHER

## 2021-07-14 RX ADMIN — PROPOFOL 100 MCG/KG/MIN: 10 INJECTION, EMULSION INTRAVENOUS at 11:20

## 2021-07-14 RX ADMIN — SODIUM CHLORIDE: 9 INJECTION, SOLUTION INTRAVENOUS at 11:17

## 2021-07-14 RX ADMIN — KETAMINE HYDROCHLORIDE 10 MG: 10 INJECTION, SOLUTION INTRAMUSCULAR; INTRAVENOUS at 11:20

## 2021-07-14 RX ADMIN — SODIUM CHLORIDE: 9 INJECTION, SOLUTION INTRAVENOUS at 10:07

## 2021-07-14 RX ADMIN — KETAMINE HYDROCHLORIDE 10 MG: 10 INJECTION, SOLUTION INTRAMUSCULAR; INTRAVENOUS at 11:22

## 2021-07-14 RX ADMIN — KETAMINE HYDROCHLORIDE 10 MG: 10 INJECTION, SOLUTION INTRAMUSCULAR; INTRAVENOUS at 11:29

## 2021-07-14 RX ADMIN — LIDOCAINE HYDROCHLORIDE 25 MG: 20 INJECTION, SOLUTION EPIDURAL; INFILTRATION; INTRACAUDAL; PERINEURAL at 11:20

## 2021-07-14 ASSESSMENT — PAIN SCALES - GENERAL
PAINLEVEL_OUTOF10: 0

## 2021-07-14 ASSESSMENT — LIFESTYLE VARIABLES: SMOKING_STATUS: 1

## 2021-07-14 ASSESSMENT — PAIN - FUNCTIONAL ASSESSMENT: PAIN_FUNCTIONAL_ASSESSMENT: 0-10

## 2021-07-14 NOTE — ANESTHESIA PRE PROCEDURE
Department of Anesthesiology  Preprocedure Note       Name:  Jaren Duarte   Age:  67 y.o.  :  1945                                          MRN:  [id number]         Date:  2021      Surgeon: Guerline Peres):  Helen Mejias MD    Procedure: Procedure(s):  COLONOSCOPY DIAGNOSTIC    Medications prior to admission:   Prior to Admission medications    Medication Sig Start Date End Date Taking? Authorizing Provider   atorvastatin (LIPITOR) 40 MG tablet TAKE 1 TABLET BY MOUTH  DAILY 7/12/21 10/10/21  Viki Wilson MD   warfarin (COUMADIN) 5 MG tablet Take 5 mg by mouth four times a week Pt takes on Tue, Bjorn Francoise, Sat, Sun    Historical Provider, MD   montelukast (SINGULAIR) 10 MG tablet Take 1 tablet by mouth nightly 21   Viki Wilson MD   albuterol sulfate  (90 Base) MCG/ACT inhaler Inhale 2 puffs into the lungs every 6 hours as needed for Wheezing 21   Viki Wilson MD   hydrOXYzine (VISTARIL) 50 MG capsule Take 1 capsule by mouth 4 times daily as needed for Anxiety 5/10/21   Viki Wilson MD   traZODone (DESYREL) 150 MG tablet Take 150 mg by mouth every evening 21   Historical Provider, MD Tone Rodriguez 250-50 MCG/DOSE AEPB Inhale 1 puff into the lungs every 12 hours 3/25/21   Viki Wilson MD   FLUoxetine (PROZAC) 20 MG capsule Take 3 capsules by mouth daily 21   Viki Wilson MD   B Complex-C-Folic Acid (HM VITAMIN B COMPLEX/VITAMIN C PO) Take by mouth daily    Historical Provider, MD   Cholecalciferol (VITAMIN D3) 50 MCG (2000 UT) CAPS Take 4,000 Units by mouth daily     Historical Provider, MD   warfarin (COUMADIN) 5 MG tablet Take 1.5 tablets by mouth daily Take 7.5mg daily.   Patient taking differently: Take 7.5 mg by mouth three times a week Pt takes on -W-F 1/6/20 6/15/21  Lin Jernigan MD   primidone (MYSOLINE) 250 MG tablet Take 750 mg by mouth nightly     Historical Provider, MD   QUEtiapine (SEROQUEL) 400 MG tablet Take 800 mg by mouth nightly    Historical Provider, MD   zinc 50 MG CAPS Take by mouth daily     Historical Provider, MD   Melatonin 10 MG TABS Take 20 mg by mouth nightly    Historical Provider, MD       Current medications:    Current Outpatient Medications   Medication Sig Dispense Refill    atorvastatin (LIPITOR) 40 MG tablet TAKE 1 TABLET BY MOUTH  DAILY 90 tablet 3    warfarin (COUMADIN) 5 MG tablet Take 5 mg by mouth four times a week Pt takes on Tue, Thur, Sat, Sun      montelukast (SINGULAIR) 10 MG tablet Take 1 tablet by mouth nightly 90 tablet 1    albuterol sulfate  (90 Base) MCG/ACT inhaler Inhale 2 puffs into the lungs every 6 hours as needed for Wheezing 3 Inhaler 1    hydrOXYzine (VISTARIL) 50 MG capsule Take 1 capsule by mouth 4 times daily as needed for Anxiety 120 capsule 0    traZODone (DESYREL) 150 MG tablet Take 150 mg by mouth every evening      WIXELA INHUB 250-50 MCG/DOSE AEPB Inhale 1 puff into the lungs every 12 hours 60 each 3    FLUoxetine (PROZAC) 20 MG capsule Take 3 capsules by mouth daily 45 capsule 0    B Complex-C-Folic Acid (HM VITAMIN B COMPLEX/VITAMIN C PO) Take by mouth daily      Cholecalciferol (VITAMIN D3) 50 MCG (2000 UT) CAPS Take 4,000 Units by mouth daily       warfarin (COUMADIN) 5 MG tablet Take 1.5 tablets by mouth daily Take 7.5mg daily.  (Patient taking differently: Take 7.5 mg by mouth three times a week Pt takes on M-W-F) 135 tablet 2    primidone (MYSOLINE) 250 MG tablet Take 750 mg by mouth nightly       QUEtiapine (SEROQUEL) 400 MG tablet Take 800 mg by mouth nightly      zinc 50 MG CAPS Take by mouth daily       Melatonin 10 MG TABS Take 20 mg by mouth nightly       Current Facility-Administered Medications   Medication Dose Route Frequency Provider Last Rate Last Admin    triamcinolone acetonide (KENALOG) injection 3 mg  3 mg Intramuscular Once Denisse Triplett MD           Allergies:  No Known Allergies    Problem List:    Patient Active Problem List   Diagnosis Code    COPD (chronic obstructive pulmonary disease) ([facility] [address] J44.9    Major depressive disorder (HCC) F32.9    Hypertension I10    Atrial fibrillation I48.91    CHF (congestive heart failure) (HCC) I50.9    Asthma J45.909    Chronic insomnia F51.04    RBBB (right bundle branch block) I45.10    S/P gastric bypass Z98.84    Hidradenitis suppurativa L73.2    Obstructive sleep apnea G47.33    Seborrheic dermatitis L21.9    Restless leg syndrome G25.81    Fourth degree hemorrhoids K64.3    Symptomatic cholelithiasis K80.20    H/O hyperlipidemia Z86.39    LAP-BAND surgery status Z98.84    Obesity (BMI 30.0-34. 9) E66.9       Past Medical History:        Diagnosis Date    Allergic rhintis 1959    Seasonal Hay Fever    Anxiety     Aortic stenosis     echo shows mild to mod    Asthma     mixed w copd    Atrial fibrillation (HCC)     takes warfarin due to cost, cardioversion 8/4/11, 7/1/11, 2/16/11, 9/23/10    Atrial flutter (HCC)     status post ablation-jack hare    Azoospermia     dx'd in teens    Bipolar 1 disorder ([facility]     Chronic insomnia     Colon polyps     on colonoscopy    COPD (chronic obstructive pulmonary disease) ([facility]     Coronary artery calcification seen on CAT scan     Depression     [facility]    Essential tremor     dr Isaias Charles GERD (gastroesophageal reflux disease)     weaned off lansoprazole    Hiatal hernia     small, ugi-esophageal dysmotility    Hidradenitis suppurativa     perirectal    history of alcoholism     sober since 2008    Hyperlipidemia     Hypertension     Insomnia     Microcytic anemia 10/14/2010    iron infusions [facility], now resolved, due to malabsorption    Obstructive sleep apnea     on bipap 17/11resolved 3 years ago    Prostate nodule     dr Cuca Ambriz, urology    Seizures ([facility] [address]     1973-One occurance. Unknown etiology. On Dilantin 1 year.     Tobacco abuse     annual chest ct in may    Tremor     [facility]       Past Surgical History:        Procedure Laterality Date    ABDOMEN SURGERY      gastric band    [place]      [place]    ATRIAL ABLATION SURGERY  2011    atrial flutter ablation    ATRIAL ABLATION SURGERY  2011    cryoablation for atrial fibrillation    ATRIAL ABLATION SURGERY  2015    RFCA for AF with PVI    BARIATRIC SURGERY  2013    band over bypass    CARDIOVERSION  x4    CATARACT REMOVAL Bilateral     [facility], LAPAROSCOPIC N/A 2016    LAPAROSCOPIC CHOLECYSTECTOMY WITH CHOLANGIOGRAM    COLONOSCOPY  10/01/2009    [place], repeat 3 years    COLONOSCOPY N/A 2020    COLONOSCOPY POLYPECTOMY SNARE/COLD BIOPSY performed by Serge Juarez MD at [place]      from chest    HEMORRHOID SURGERY  2016    HERNIA REPAIR      Repaired in conjunction with Band Over Bypass, .     OTHER SURGICAL HISTORY  2015    excision vivian rectal cyst    PROSTATE BIOPSY  2021    neg    DIMITRIS-EN-Y GASTRIC BYPASS      [facility]  [place] [place]    UPPER GASTROINTESTINAL ENDOSCOPY  2013    [place]    UPPER GASTROINTESTINAL ENDOSCOPY N/A 2020    EGD BIOPSY performed by Serge Juarez MD at [address] N/A 10/02/2020    EGD DIAGNOSTIC ONLY performed by Serge Juarez MD at [address]  2021       Social History:    Social History     Tobacco Use    Smoking status: Current Every Day Smoker     Packs/day: 3.00     Years: 50.00     Pack years: 150.00     Start date: 1959     Last attempt to quit: 2009     Years since quittin.9    Smokeless tobacco: Never Used    Tobacco comment: 1 ppd on average, more so during covid   Substance Use Topics    Alcohol use: Not Currently     Comment: rare Ready to quit: Not Answered  Counseling given: Not Answered  Comment: 1 ppd on average, more so during covid      Vital Signs (Current): There were no vitals filed for this visit. BP Readings from Last 3 Encounters:   05/06/21 (!) 98/50   04/20/21 106/68   02/05/21 108/60       NPO Status:                                                                                 BMI:   Wt Readings from Last 3 Encounters:   06/15/21 190 lb 9.6 oz (86.5 kg)   07/08/21 190 lb (86.2 kg)   05/06/21 192 lb (87.1 kg)     There is no height or weight on file to calculate BMI.    CBC:   Lab Results   Component Value Date    WBC 10.1 05/06/2021    RBC 4.96 05/06/2021    HGB 15.1 05/06/2021    HCT 44.5 05/06/2021    MCV 89.6 05/06/2021    RDW 23.2 05/06/2021     05/06/2021       CMP:   Lab Results   Component Value Date     04/15/2021    K 5.0 04/15/2021     04/15/2021    CO2 28 04/15/2021    BUN 14 04/15/2021    CREATININE 0.17 04/15/2021    GFRAA >60 02/23/2021    GFRAA >60 08/16/2012    AGRATIO 1.3 12/03/2020    LABGLOM >90 04/15/2021    LABGLOM >60 02/23/2021    LABGLOM 97 05/27/2011    GLUCOSE 95 04/15/2021    GLUCOSE 96 05/27/2011    PROT 7.2 12/03/2020    PROT 7.4 06/18/2012    CALCIUM 9.7 04/15/2021    BILITOT 0.6 04/15/2021    ALKPHOS 99 04/15/2021    AST 28 04/15/2021    ALT 30 04/15/2021       POC Tests: No results for input(s): POCGLU, POCNA, POCK, POCCL, POCBUN, POCHEMO, POCHCT in the last 72 hours.     Coags:   Lab Results   Component Value Date    PROTIME 11.9 10/02/2020    PROTIME 21 09/10/2018    INR 3.1 06/29/2021    INR 1.03 10/02/2020    APTT 31.6 10/02/2020       HCG (If Applicable): No results found for: PREGTESTUR, PREGSERUM, HCG, HCGQUANT     ABGs: No results found for: PHART, PO2ART, ZCF2MZL, XCA7CWO, BEART, B7YQEAVJ     Type & Screen (If Applicable):  Lab Results   Component Value Date    LABABO O 09/01/2011    LABRH Positive 09/01/2011       Drug/Infectious Status (If Applicable):  No results found for: HIV, HEPCAB    COVID-19 Screening (If Applicable):   Lab Results   Component Value Date    COVID19 NOT DETECTED 02/17/2021         Anesthesia Evaluation  Patient summary reviewed and Nursing notes reviewed no history of anesthetic complications:   Airway: Mallampati: II  TM distance: >3 FB   Neck ROM: full  Mouth opening: > = 3 FB Dental:      Comment: Only has two teeth left most of rest broken off at gumline    Pulmonary:   (+) COPD:  sleep apnea (no longer uses cpap or bipap):  rhonchi (rhonchi mostly clear with cough),  asthma: current smoker    (-) wheezes                           Cardiovascular:  Exercise tolerance: good (>4 METS),   (+) hypertension:, CAD: non-obstructive, dysrhythmias: atrial fibrillation, CHF:,         Rhythm: regular  Rate: normal                 ROS comment: RBBB     Neuro/Psych:   (+) psychiatric history: stable with treatment   (-) seizures, TIA and CVA            ROS comment: RESTLESS LEG GI/Hepatic/Renal:   (+) hiatal hernia, GERD (denies gerd): well controlled,          ROS comment: HX OF LAP BAND. Endo/Other:    (+) blood dyscrasia: anticoagulation therapy:., .                 Abdominal:             Vascular: Other Findings:               Anesthesia Plan      TIVA and MAC     ASA 3       Induction: intravenous. MIPS: Prophylactic antiemetics administered. Anesthetic plan and risks discussed with patient. Plan discussed with CRNA.                   Prasanna Bernard MD   7/14/2021

## 2021-07-14 NOTE — ANESTHESIA POSTPROCEDURE EVALUATION
Department of Anesthesiology  Postprocedure Note    Patient: Delmy Matute  MRN: [id number]  YOB: 1945  Date of evaluation: 7/14/2021  Time:  12:17 PM     Procedure Summary     Date: 07/14/21 Room / Location: [facility] ENDO  / [facility]    Anesthesia Start: 1117 Anesthesia Stop: 1150    Procedure: COLONOSCOPY POLYPECTOMY SNARE/COLD BIOPSY (N/A ) Diagnosis: (HISTORY OF COLON POLYPS Z86.010)    Surgeons: Migdalia Ha MD Responsible Provider: Vero Neri MD    Anesthesia Type: TIVA, MAC ASA Status: 3          Anesthesia Type: TIVA, MAC    Crissy Phase I: Crissy Score: 10    Crissy Phase II: Crissy Score: 10    Last vitals: Reviewed and per EMR flowsheets.        Anesthesia Post Evaluation    Patient location during evaluation: PACU  Patient participation: complete - patient participated  Level of consciousness: awake and awake and alert  Pain score: 2  Airway patency: patent  Nausea & Vomiting: no vomiting  Complications: no  Cardiovascular status: blood pressure returned to baseline  Respiratory status: acceptable  Hydration status: euvolemic

## 2021-07-23 ENCOUNTER — ANTI-COAG VISIT (OUTPATIENT)
Dept: PHARMACY | Age: 76
End: 2021-07-23
Payer: MEDICARE

## 2021-07-23 DIAGNOSIS — I48.91 ATRIAL FIBRILLATION, UNSPECIFIED TYPE (HCC): Primary | ICD-10-CM

## 2021-07-23 LAB — INTERNATIONAL NORMALIZATION RATIO, POC: 2.1

## 2021-07-23 PROCEDURE — 85610 PROTHROMBIN TIME: CPT

## 2021-07-23 PROCEDURE — 99211 OFF/OP EST MAY X REQ PHY/QHP: CPT

## 2021-07-23 NOTE — PROGRESS NOTES
Mr. Tomás Jeffrey is a 67 y.o. y/o male with history of Afib   He presents today for anticoagulation monitoring and adjustment. Pertinent PMH: COPD, CHF, HTN  Patient has his lap band adjusted occasioanly which will continue to affect his diet. Patient Reported Findings:  Yes     No  [x]   []       Patient verifies current dosing regimen as listed- patient cuts out calendar and puts it on top of his pill box and follows day by day- says he follows the AVS---> confirmed dose then got confused about what he took last night, stated followed AVS ---> changed dose   []   [x]       S/S bleeding/bruising/swelling/SOB- denies   []   [x]       Blood in urine or stool- denies---> black stool after starting iron, has apt this week, doc aware--->denies     [x]   []       Procedures scheduled in the future at this time upcoming procedure doesn't have date. Needs to stop warfarin 7 days prior and 3 days after, per pt ok from Dr. Lisa Talbert already---> had procedure 4/5 and held for 7 days before and then resumed 4/7---> colonoscopy 7/14, states contacted Lisa Talbert to get clearance- was told to hold starting 7/8 and resume 7/15 and take 10mg, no lovenox      []   [x]       Missed dose denies    []   [x]       Extra dose- denies   [x]   []       Change in medications -was on pain meds, done now--> more tylenol recently --> is having iron infusions--> increase vistaril 50mg TID --> has been taking 1500 mg 1-2 times daily for headaches---> continues with tylenol---> vistaril inc, iron added ---> was on keflex, done now, inc atorvastatin, vistaril increased---> no changes        [x]   []       Change in health/diet/appetite He has had  green beans 2 x last week and has had broccoli 4 times a week which is an increase. He will also have mixed vegetables depending on other food available on the menu. ([employer], green beans, corn). He tries to keep Vit K intake consistent, but sometimes has trouble.  --->has had less greens than normal ---> no vit k--> no change, no NVD--> diarrhea for about a week --> no vit k, still having diarrhea (believes it is from vaccine from 2 weeks ago)---> no greens, no appetite, lost a lot of weight, no NVD---> denies, no NVD---> not many greens, no NVD  []   [x]       Change in alcohol use- denies   []   [x]       Change in activity  []   [x]       Hospital admission  []   [x]       Emergency department visit  []   [x]       Other complaints   []   [x]       Tobacco use  Stopped smoking as of Wed 11/28/19. Former smoked 2 1/2 ppd. Clinical Outcomes:  Yes     No  []   [x]       Major bleeding event  []   [x]       Thromboembolic event  Uses a pillbox  Duration of warfarin Therapy: indefinitely  INR Range:  2.0-3.0     INR is 2.1 today  Continue taking dose of 7.5 mg on Mon, Wed and Fri and 5 mg all other days of the week. Encouraged patient to remain consistent with vit k intake.   Recheck INR in 4 weeks, 8/9    **Patient does NOT want a yellow highlighter** <---- refused AVS, wanted apt card only    Referring cardiologist: Dr. Roopa Ray  INR (no units)   Date Value   07/14/2021 0.93   06/29/2021 3.1   06/01/2021 2.6   05/03/2021 2.7   04/12/2021 2.9   10/02/2020 1.03   07/31/2020 1.05   11/27/2019 5.16 ([place])     For Pharmacy Admin Tracking Only     Total # of Interventions Recommended: 0   Total # of Interventions Accepted: 0   Time Spent (min): 15

## 2021-07-28 NOTE — H&P
Gastroenterology Note                 Pre-operative History and Physical    Patient: Luis Carlos Castanon  : 1945  CSN:     History Obtained From:   Patient or guardian. HISTORY OF PRESENT ILLNESS:    The patient is a 67 y.o. male here for Endoscopy. Past Medical History:    Past Medical History:   Diagnosis Date    Allergic rhintis     Seasonal Hay Fever    Anxiety     Aortic stenosis     echo shows mild to mod    Asthma     mixed w copd    Atrial fibrillation (HCC)     takes warfarin due to cost, cardioversion 11, 11, 11, 9/23/10    Atrial flutter (HCC)     status post ablation-jack hare    Azoospermia     dx'd in teens    B[facility] [address]     Chronic insomnia     Colon polyps     on colonoscopy    COPD (chronic obstructive pulmonary disease) ([facility] [address] [facility]     Coronary artery calcification seen on CAT scan     Depression     [facility]    Essential tremor     dr Jazmin Wei   [employer] GERD (gastroesophageal reflux disease)     weaned off lansoprazole    Hiatal hernia     small, ugi-esophageal dysmotility    Hidradenitis suppurativa     perirectal    history of alcoholism     sober since     Hyperlipidemia     Hypertension     Insomnia     Microcytic anemia 10/14/2010    iron infusions [facility], now resolved, due to malabsorption    Obstructive sleep apnea     on bipap resolved 3 years ago    Prostate nodule     dr Win November, urology    Seizures ([facility] [address]     -One occurance. Unknown etiology. On Dilantin 1 year.     Tobacco abuse     annual chest ct in may    Tremor     primidone as per  neurology     Past Surgical History:    Past Surgical History:   Procedure Laterality Date    ABDOMEN SURGERY      gastric band    [place]    ATRIAL ABLATION SURGERY  2011    atrial flutter ablation    ATRIAL ABLATION SURGERY  2011    cryoablation for atrial fibrillation    ATRIAL ABLATION SURGERY  2015 RFCA for AF with PVI    BARIATRIC SURGERY  08/05/2013    band over bypass    CARDIOVERSION  x4    CATARACT REMOVAL Bilateral 2009    [facility], LAPAROSCOPIC N/A 06/20/2016    LAPAROSCOPIC CHOLECYSTECTOMY WITH CHOLANGIOGRAM    COLONOSCOPY  10/01/2009    [place], repeat 3 years    COLONOSCOPY N/A 07/31/2020    COLONOSCOPY POLYPECTOMY SNARE/COLD BIOPSY performed by David Chi MD at [address] [place] [address] COLONOSCOPY N/A 7/14/2021    COLONOSCOPY POLYPECTOMY SNARE/COLD BIOPSY performed by David Chi MD at [place]      from chest    HEMORRHOID SURGERY  03/23/2016    HERNIA REPAIR      Repaired in conjunction with Band Over Bypass, 2013.     OTHER SURGICAL HISTORY  09/21/2015    excision vivian rectal cyst    PROSTATE BIOPSY  04/2021    neg    DIMITRIS-EN-Y GASTRIC BYPASS  2001    [facility]    UPPER GASTROINTESTINAL ENDOSCOPY  05/13/2013    [facility]    UPPER GASTROINTESTINAL ENDOSCOPY N/A 07/31/2020    EGD BIOPSY performed by David Chi MD at [address] N/A 10/02/2020    EGD DIAGNOSTIC ONLY performed by David Chi MD at  [address]  04/2021     Medications Prior to Admission:   Current Facility-Administered Medications on File Prior to Encounter   Medication Dose Route Frequency Provider Last Rate Last Admin    triamcinolone acetonide (KENALOG) injection 3 mg  3 mg Intramuscular Once Blake Hernandez MD         Current Outpatient Medications on File Prior to Encounter   Medication Sig Dispense Refill    vitamin B-12 (CYANOCOBALAMIN) 1000 MCG tablet Take 1,000 mcg by mouth daily      hydrOXYzine (VISTARIL) 50 MG capsule Take 1 capsule by mouth 4 times daily as needed for Anxiety 120 capsule 0    traZODone (DESYREL) 150 MG tablet Take 150 mg by mouth every evening      WIXELA INHUB 250-50 MCG/DOSE AEPB Inhale 1 puff into the lungs every 12 hours 60 each 3    FLUoxetine (PROZAC) 20 MG capsule Take 3 capsules by mouth daily 45 capsule 0    Cholecalciferol (VITAMIN D3) 50 MCG (2000 UT) CAPS Take 4,000 Units by mouth daily       primidone (MYSOLINE) 250 MG tablet Take 750 mg by mouth nightly       QUEtiapine (SEROQUEL) 400 MG tablet Take 800 mg by mouth nightly      zinc 50 MG CAPS Take by mouth daily       Melatonin 10 MG TABS Take 20 mg by mouth nightly      warfarin (COUMADIN) 5 MG tablet Take 5 mg by mouth four times a week Pt takes on Tue, Thur, Sat, Sun      B Complex-C-Folic Acid (HM VITAMIN B COMPLEX/VITAMIN C PO) Take by mouth daily      warfarin (COUMADIN) 5 MG tablet Take 1.5 tablets by mouth daily Take 7.5mg daily. (Patient taking differently: Take 7.5 mg by mouth three times a week Pt takes on --) 135 tablet 2        Allergies:  Patient has no known allergies. Social History:   Social History     Tobacco Use    Smoking status: Current Every Day Smoker     Packs/day: 3.00     Years: 50.00     Pack years: 150.00     Start date: 1959     Last attempt to quit: 2009     Years since quittin.9    Smokeless tobacco: Never Used    Tobacco comment: 1 ppd on average, more so during covid   Substance Use Topics    Alcohol use: Not Currently     Family History:   Family History   Problem Relation Age of Onset   [facility] Mother     Emphysema Mother     Depression Mother         ECT; meds.     Heart Disease Mother         Mitral Valve Replacement    Heart Disease Father     Hypertension Father     Alcohol Abuse Father     High Blood Pressure Father     High Cholesterol Father         Treated with meds    Stroke Father         AHD    Substance Abuse Father         ETOH Abuse       PHYSICAL EXAM:      /68   Pulse 50   Temp 96.7 °F (35.9 °C) (Temporal)   Resp 16   Ht 5' 8\" (1.727 m)   Wt 190 lb (86.2 kg)   SpO2 97%   BMI 28.89 kg/m²  I        Heart:  RRR, normal s1s2    Lungs: CTA and normal effort    Abdomen:   Soft, nt nd. ASSESSMENT AND PLAN:    1. Patient is a 67 y.o. male here for endoscopy with MAC sedation. 2.  Procedure options, risks and benefits reviewed with patient and/or guardian. They express understanding.

## 2021-08-07 ENCOUNTER — HOSPITAL ENCOUNTER (EMERGENCY)
Age: 76
Discharge: LEFT BEFORE DISCHARGE | End: 2021-08-07
Payer: MEDICARE

## 2021-08-07 ENCOUNTER — APPOINTMENT (OUTPATIENT)
Dept: GENERAL RADIOLOGY | Age: 76
End: 2021-08-07
Payer: MEDICARE

## 2021-08-07 VITALS
SYSTOLIC BLOOD PRESSURE: 119 MMHG | WEIGHT: 189.6 LBS | TEMPERATURE: 98 F | HEART RATE: 63 BPM | RESPIRATION RATE: 15 BRPM | BODY MASS INDEX: 28.73 KG/M2 | DIASTOLIC BLOOD PRESSURE: 72 MMHG | HEIGHT: 68 IN | OXYGEN SATURATION: 94 %

## 2021-08-07 PROCEDURE — 73502 X-RAY EXAM HIP UNI 2-3 VIEWS: CPT

## 2021-08-07 PROCEDURE — 4500000002 HC ER NO CHARGE

## 2021-08-07 PROCEDURE — 73630 X-RAY EXAM OF FOOT: CPT

## 2021-08-07 ASSESSMENT — PAIN DESCRIPTION - ORIENTATION: ORIENTATION: RIGHT

## 2021-08-07 ASSESSMENT — PAIN SCALES - GENERAL: PAINLEVEL_OUTOF10: 4

## 2021-08-07 ASSESSMENT — PAIN DESCRIPTION - LOCATION: LOCATION: HIP

## 2021-08-07 ASSESSMENT — PAIN DESCRIPTION - PAIN TYPE: TYPE: ACUTE PAIN

## 2021-08-09 ENCOUNTER — OFFICE VISIT (OUTPATIENT)
Dept: INTERNAL MEDICINE CLINIC | Age: 76
End: 2021-08-09
Payer: MEDICARE

## 2021-08-09 VITALS
RESPIRATION RATE: 16 BRPM | OXYGEN SATURATION: 98 % | SYSTOLIC BLOOD PRESSURE: 98 MMHG | HEART RATE: 58 BPM | BODY MASS INDEX: 28.89 KG/M2 | TEMPERATURE: 98.5 F | DIASTOLIC BLOOD PRESSURE: 50 MMHG | WEIGHT: 190 LBS

## 2021-08-09 DIAGNOSIS — J44.9 CHRONIC OBSTRUCTIVE PULMONARY DISEASE, UNSPECIFIED COPD TYPE (HCC): ICD-10-CM

## 2021-08-09 DIAGNOSIS — I25.10 CORONARY ARTERY CALCIFICATION SEEN ON CAT SCAN: ICD-10-CM

## 2021-08-09 DIAGNOSIS — D64.9 ANEMIA, UNSPECIFIED TYPE: ICD-10-CM

## 2021-08-09 DIAGNOSIS — M89.9 LYTIC BONE LESIONS ON XRAY: ICD-10-CM

## 2021-08-09 DIAGNOSIS — Z72.0 TOBACCO ABUSE: ICD-10-CM

## 2021-08-09 DIAGNOSIS — F41.9 ANXIETY: ICD-10-CM

## 2021-08-09 DIAGNOSIS — G25.0 ESSENTIAL TREMOR: ICD-10-CM

## 2021-08-09 DIAGNOSIS — I48.91 ATRIAL FIBRILLATION, UNSPECIFIED TYPE (HCC): ICD-10-CM

## 2021-08-09 DIAGNOSIS — R93.7 ABNORMAL BONE DENSITY SCREENING: Primary | ICD-10-CM

## 2021-08-09 PROCEDURE — 3023F SPIROM DOC REV: CPT | Performed by: INTERNAL MEDICINE

## 2021-08-09 PROCEDURE — 99215 OFFICE O/P EST HI 40 MIN: CPT | Performed by: INTERNAL MEDICINE

## 2021-08-09 PROCEDURE — 1123F ACP DISCUSS/DSCN MKR DOCD: CPT | Performed by: INTERNAL MEDICINE

## 2021-08-09 PROCEDURE — G8926 SPIRO NO PERF OR DOC: HCPCS | Performed by: INTERNAL MEDICINE

## 2021-08-09 PROCEDURE — 4040F PNEUMOC VAC/ADMIN/RCVD: CPT | Performed by: INTERNAL MEDICINE

## 2021-08-09 PROCEDURE — 4004F PT TOBACCO SCREEN RCVD TLK: CPT | Performed by: INTERNAL MEDICINE

## 2021-08-09 PROCEDURE — G8427 DOCREV CUR MEDS BY ELIG CLIN: HCPCS | Performed by: INTERNAL MEDICINE

## 2021-08-09 PROCEDURE — G8417 CALC BMI ABV UP PARAM F/U: HCPCS | Performed by: INTERNAL MEDICINE

## 2021-08-09 RX ORDER — FLUTICASONE PROPIONATE 50 MCG
2 SPRAY, SUSPENSION (ML) NASAL DAILY
Qty: 1 BOTTLE | Refills: 5 | Status: SHIPPED | OUTPATIENT
Start: 2021-08-09 | End: 2021-09-23

## 2021-08-09 RX ORDER — MONTELUKAST SODIUM 10 MG/1
10 TABLET ORAL NIGHTLY
Qty: 90 TABLET | Refills: 1
Start: 2021-08-09 | End: 2021-11-08 | Stop reason: SDUPTHER

## 2021-08-09 NOTE — PATIENT INSTRUCTIONS
Jamal tape left toes 3/4 and see podiatry    See urology about pelvic calcifications    dexa  [id number]    Lab    Try flonase for headaches and if not helping let me know and will repeat head imaging    Talk to cardiology about repeat echo

## 2021-08-09 NOTE — PROGRESS NOTES
Gabriel Tanner (:  1945) is a 67 y.o. male, here for evaluation of the following chief complaint(s):    Fall (Recent fall while in shower. Seen at [facility] for x-rays but no ER visit. Headaches daily.)      ASSESSMENT/PLAN:  1. Abnormal bone density screening  Osteopenia noted on xrays done in ER with recent fall.  -     DEXA BONE DENSITY AXIAL SKELETON; Future  2. Lytic bone lesions on xray  -     PROTEIN ELECTROPHORESIS, SERUM; Future  Possible toe fracture. Advised mandi tape and see your podiatrist.  3. Anemia, unspecified type  Will try to get recent [facility] records. -     PROTEIN ELECTROPHORESIS, SERUM; Future  4. Tobacco abuse  Ct Lungs due . Counseled to quit  5. Coronary artery calcification seen on CAT scan  On statin. Has not been on aspirin, perhaps because on Warfarin? 6. Chronic obstructive pulmonary disease, unspecified COPD type (HCC)  Stable on Albuterol, Singulair, Wixela  7. Atrial fibrillation, unspecified type (HCC)  Stable on Warfarin, blood pressure on low side  8. Essential tremor  Stable on Primidone  9. Anxiety disorder-Sees Samm Lujan. Stable on Prozac, hydroxyzine, melatonin, quetiapine, trazodone. 10. Pelvic calcifications noted on xray - See urology  11. Frontal headaches - Trial of Flonase and if not improving will consider imaging. Reviewed 2019 Head CT  12. Aortic stenosis - told him to speak with Cardiology about repeat Echo    Return in about 3 months (around 2021). SUBJECTIVE/OBJECTIVE:  HPI   Patient is here for routine visit. He was in the emergency room over the weekend for a fall related to a showerhead that flew off and he fell. He had x-rays and then left AMA. There is a possible fracture of the left third and fourth phalanx. Hip x-ray was without fracture. He has a bruise of the right hip and the left midfoot. He also reports a headache every day for a year. He finds himself using Tylenol every day.   It is mostly left frontal.  He does have a lot of mucus from his nose, especially in the morning. He continues to smoke 3 packs of cigarettes per day. He denies increased shortness of breath or chest pain. Past Medical History:   Diagnosis Date    Allergic rhintis 1959    Seasonal Hay Fever    Anxiety     Aortic stenosis     echo shows mild to mod    Asthma     mixed w copd    Atrial fibrillation (HCC)     takes warfarin due to cost, cardioversion 8/4/11, 7/1/11, 2/16/11, 9/23/10    Atrial flutter (HCC)     status post ablation-jack hare    Azoospermia     dx'd in teens    [facility] 1 disorder [facility] [address]     Chronic insomnia     Colon polyps     on colonoscopy    COPD (chronic obstructive pulmonary disease) ([facility] [address]     Coronary artery calcification seen on CAT scan     Depression     [facility]    Essential tremor     dr Janak Harmon Favorite Essential tremor     GERD (gastroesophageal reflux disease)     weaned off lansoprazole    Hiatal hernia     small, ugi-esophageal dysmotility    Hidradenitis suppurativa     perirectal    history of alcoholism     sober since 2008    Hyperlipidemia     Hypertension     Insomnia     Microcytic anemia 10/14/2010    iron infusions [facility], now resolved, due to malabsorption    Obstructive sleep apnea     on bipap 17/11resolved 3 years ago    Prostate nodule     dr Liat Christopher, urology    Seizures ([facility] [address]     1973-One occurance. Unknown etiology. On Dilantin 1 year.     Tobacco abuse     annual chest ct in may    Tremor     primidone as per  neurology       Current Outpatient Medications   Medication Sig Dispense Refill    montelukast (SINGULAIR) 10 MG tablet Take 1 tablet by mouth nightly 90 tablet 1    fluticasone (FLONASE) 50 MCG/ACT nasal spray 2 sprays by Each Nostril route daily 1 Bottle 5    vitamin B-12 (CYANOCOBALAMIN) 1000 MCG tablet Take 1,000 mcg by mouth daily      atorvastatin (LIPITOR) 40 MG tablet TAKE 1 TABLET BY MOUTH  DAILY 90 tablet 3    warfarin (COUMADIN) 5 MG tablet Take 5 mg by mouth four times a week Pt takes on Tue, Thur, Sat, Sun      albuterol sulfate  (90 Base) MCG/ACT inhaler Inhale 2 puffs into the lungs every 6 hours as needed for Wheezing 3 Inhaler 1    hydrOXYzine (VISTARIL) 50 MG capsule Take 1 capsule by mouth 4 times daily as needed for Anxiety 120 capsule 0    traZODone (DESYREL) 150 MG tablet Take 150 mg by mouth every evening      WIXELA INHUB 250-50 MCG/DOSE AEPB Inhale 1 puff into the lungs every 12 hours 60 each 3    FLUoxetine (PROZAC) 20 MG capsule Take 3 capsules by mouth daily 45 capsule 0    Cholecalciferol (VITAMIN D3) 50 MCG (2000 UT) CAPS Take 4,000 Units by mouth daily       warfarin (COUMADIN) 5 MG tablet Take 1.5 tablets by mouth daily Take 7.5mg daily. (Patient taking differently: Take 7.5 mg by mouth three times a week Pt takes on M-W-F) 135 tablet 2    primidone (MYSOLINE) 250 MG tablet Take 750 mg by mouth nightly       QUEtiapine (SEROQUEL) 400 MG tablet Take 800 mg by mouth nightly      zinc 50 MG CAPS Take by mouth daily       Melatonin 10 MG TABS Take 20 mg by mouth nightly       Current Facility-Administered Medications   Medication Dose Route Frequency Provider Last Rate Last Admin    triamcinolone acetonide (KENALOG) injection 3 mg  3 mg Intramuscular Once Brie Vickers MD           Physical Exam  Vitals reviewed. Constitutional:       General: He is not in acute distress. HENT:      Head: Normocephalic and atraumatic. Eyes:      Extraocular Movements: Extraocular movements intact. Cardiovascular:      Rate and Rhythm: Bradycardia present. Rhythm irregular. Heart sounds: Murmur (loud) heard. Pulmonary:      Effort: Pulmonary effort is normal.      Breath sounds: Normal breath sounds. Musculoskeletal:      Right lower leg: No edema. Left lower leg: No edema. Skin:     Comments: Bruising of right lateral hip and base of left 3/4 toes   Neurological:      General: No focal deficit present. Mental Status: He is alert and oriented to person, place, and time. Psychiatric:         Mood and Affect: Mood normal.           On this date I have spent 40 minutes reviewing previous notes, test results and face to face with the patient discussing the diagnosis and importance of compliance with the treatment plan as well as documenting on the day of the visit. This note was generated completely or in part utilizing Dragon dictation speech recognition software. Occasionally, words are mistranscribed and despite editing, the text may contain inaccuracies due to incorrect word recognition. If further clarification is needed please contact the office at [phone]          An electronic signature was used to authenticate this note.     --Jatinder Grier MD

## 2021-08-16 ENCOUNTER — ANTI-COAG VISIT (OUTPATIENT)
Dept: PHARMACY | Age: 76
End: 2021-08-16
Payer: MEDICARE

## 2021-08-16 DIAGNOSIS — I48.91 ATRIAL FIBRILLATION, UNSPECIFIED TYPE (HCC): Primary | ICD-10-CM

## 2021-08-16 LAB — INTERNATIONAL NORMALIZATION RATIO, POC: 3

## 2021-08-16 PROCEDURE — 99211 OFF/OP EST MAY X REQ PHY/QHP: CPT

## 2021-08-16 PROCEDURE — 85610 PROTHROMBIN TIME: CPT

## 2021-08-16 NOTE — PROGRESS NOTES
Mr. Joan Molina is a 67 y.o. y/o male with history of Afib   He presents today for anticoagulation monitoring and adjustment. Pertinent PMH: COPD, CHF, HTN  Patient has his lap band adjusted occasioanly which will continue to affect his diet. Patient Reported Findings:  Yes     No  [x]   []       Patient verifies current dosing regimen as listed- patient cuts out calendar and puts it on top of his pill box and follows day by day- says he follows the AVS---> confirmed dose then got confused about what he took last night, stated followed AVS ---> changed dose   [x]   []       S/S bleeding/bruising/swelling/SOB- has large bruise on hip from fall in shower   []   [x]       Blood in urine or stool- denies---> black stool after starting iron, has apt this week, doc aware--->denies     []   [x]       Procedures scheduled in the future at this time upcoming procedure doesn't have date. Needs to stop warfarin 7 days prior and 3 days after, per pt ok from Dr. Constanza Mata already---> had procedure 4/5 and held for 7 days before and then resumed 4/7---> colonoscopy 7/14, states contacted Constanza Mata to get clearance- was told to hold starting 7/8 and resume 7/15 and take 10mg, no lovenox --> none      []   [x]       Missed dose denies    []   [x]       Extra dose- denies   []   [x]       Change in medications -was on pain meds, done now--> more tylenol recently --> is having iron infusions--> increase vistaril 50mg TID --> has been taking 1500 mg 1-2 times daily for headaches---> continues with tylenol---> vistaril inc, iron added ---> was on keflex, done now, inc atorvastatin, vistaril increased---> no changes        []   [x]       Change in health/diet/appetite He has had  green beans 2 x last week and has had broccoli 4 times a week which is an increase. He will also have mixed vegetables depending on other food available on the menu. (tani slaw, green beans, corn).   He tries to keep Vit K intake consistent, but sometimes has trouble. --->has had less greens than normal ---> no vit k--> no change, no NVD--> diarrhea for about a week --> no vit k, still having diarrhea (believes it is from vaccine from 2 weeks ago)---> no greens, no appetite, lost a lot of weight, no NVD---> denies, no NVD   []   [x]       Change in alcohol use- denies   []   [x]       Change in activity  []   [x]       Hospital admission  [x]   []       Emergency department visit 8/7 for fall. No changes    []   [x]       Other complaints   []   [x]       Tobacco use  Stopped smoking as of Wed 11/28/19. Former smoked 2 1/2 ppd. Clinical Outcomes:  Yes     No  []   [x]       Major bleeding event  []   [x]       Thromboembolic event  Uses a pillbox  Duration of warfarin Therapy: indefinitely  INR Range:  2.0-3.0     INR is 3 today  Continue taking dose of 7.5 mg on Mon, Wed and Fri and 5 mg all other days of the week. Encouraged patient to remain consistent with vit k intake.   Recheck INR in 4 weeks, 9/13    **Patient does NOT want a yellow highlighter** <---- refused AVS, wanted apt card only    Referring cardiologist: Dr. Geo Garrido  INR (no units)   Date Value   07/23/2021 2.1   07/14/2021 0.93   06/29/2021 3.1   06/01/2021 2.6   05/03/2021 2.7   10/02/2020 1.03   07/31/2020 1.05   11/27/2019 5.16 ([facility])     For Pharmacy Admin Tracking Only     Total # of Interventions Recommended: 0   Total # of Interventions Accepted: 0   Time Spent (min): 15

## 2021-08-18 RX ORDER — ALBUTEROL SULFATE 90 UG/1
AEROSOL, METERED RESPIRATORY (INHALATION)
Qty: 34 G | Refills: 3 | Status: SHIPPED | OUTPATIENT
Start: 2021-08-18

## 2021-08-26 DIAGNOSIS — G44.52 NEW DAILY PERSISTENT HEADACHE: Primary | ICD-10-CM

## 2021-08-30 DIAGNOSIS — I35.0 AORTIC VALVE STENOSIS, ETIOLOGY OF CARDIAC VALVE DISEASE UNSPECIFIED: Primary | ICD-10-CM

## 2021-09-03 ENCOUNTER — HOSPITAL ENCOUNTER (OUTPATIENT)
Dept: GENERAL RADIOLOGY | Age: 76
Discharge: HOME | End: 2021-09-03
Payer: MEDICARE

## 2021-09-03 ENCOUNTER — HOSPITAL ENCOUNTER (OUTPATIENT)
Age: 76
Discharge: HOME | End: 2021-09-03
Payer: MEDICARE

## 2021-09-03 DIAGNOSIS — M89.9 LYTIC BONE LESIONS ON XRAY: ICD-10-CM

## 2021-09-03 DIAGNOSIS — D64.9 ANEMIA, UNSPECIFIED TYPE: ICD-10-CM

## 2021-09-03 DIAGNOSIS — R93.7 ABNORMAL BONE DENSITY SCREENING: ICD-10-CM

## 2021-09-03 PROCEDURE — 84155 ASSAY OF PROTEIN SERUM: CPT

## 2021-09-03 PROCEDURE — 36415 COLL VENOUS BLD VENIPUNCTURE: CPT

## 2021-09-03 PROCEDURE — 84165 PROTEIN E-PHORESIS SERUM: CPT

## 2021-09-03 PROCEDURE — 77080 DXA BONE DENSITY AXIAL: CPT

## 2021-09-06 LAB
ALBUMIN SERPL-MCNC: 3.4 G/DL (ref 3.1–4.9)
ALPHA-1-GLOBULIN: 0.3 G/DL (ref 0.2–0.4)
ALPHA-2-GLOBULIN: 0.9 G/DL (ref 0.4–1.1)
BETA GLOBULIN: 1.1 G/DL (ref 0.9–1.6)
GAMMA GLOBULIN: 0.8 G/DL (ref 0.6–1.8)
TOTAL PROTEIN: 6.5 G/DL (ref 6.4–8.2)

## 2021-09-07 LAB — SPE/IFE INTERPRETATION: NORMAL

## 2021-09-12 ENCOUNTER — TELEPHONE (OUTPATIENT)
Dept: INTERNAL MEDICINE CLINIC | Age: 76
End: 2021-09-12

## 2021-09-13 ENCOUNTER — ANTI-COAG VISIT (OUTPATIENT)
Dept: PHARMACY | Age: 76
End: 2021-09-13
Payer: MEDICARE

## 2021-09-13 DIAGNOSIS — I48.91 ATRIAL FIBRILLATION, UNSPECIFIED TYPE (HCC): Primary | ICD-10-CM

## 2021-09-13 LAB — INTERNATIONAL NORMALIZATION RATIO, POC: 3

## 2021-09-13 PROCEDURE — 99211 OFF/OP EST MAY X REQ PHY/QHP: CPT

## 2021-09-13 PROCEDURE — 85610 PROTHROMBIN TIME: CPT

## 2021-09-13 NOTE — PROGRESS NOTES
Mr. Corrie Lares is a 67 y.o. y/o male with history of Afib   He presents today for anticoagulation monitoring and adjustment. Pertinent PMH: COPD, CHF, HTN  Patient has his lap band adjusted occasioanly which will continue to affect his diet. Patient Reported Findings:  Yes     No  [x]   []       Patient verifies current dosing regimen as listed- patient cuts out calendar and puts it on top of his pill box and follows day by day- says he follows the AVS---> confirmed dose then got confused about what he took last night, stated followed AVS ---> changed dose---> states correct dose   [x]   []       S/S bleeding/bruising/swelling/SOB- has large bruise on hip from fall in shower ---> denies   []   [x]       Blood in urine or stool- denies---> black stool after starting iron, has apt this week, doc aware--->denies     []   [x]       Procedures scheduled in the future at this time upcoming procedure doesn't have date. Needs to stop warfarin 7 days prior and 3 days after, per pt ok from Dr. Dang Patient already---> had procedure 4/5 and held for 7 days before and then resumed 4/7---> colonoscopy 7/14, states contacted Laurence Patient to get clearance- was told to hold starting 7/8 and resume 7/15 and take 10mg, no lovenox --> none      []   [x]       Missed dose denies    []   [x]       Extra dose- not sure if he took 5mg or 7.5mg yesterday, states got confused    []   [x]       Change in medications -was on pain meds, done now--> more tylenol recently --> is having iron infusions--> increase vistaril 50mg TID --> has been taking 1500 mg 1-2 times daily for headaches---> continues with tylenol---> vistaril inc, iron added ---> was on keflex, done now, inc atorvastatin, vistaril increased---> no changes        []   [x]       Change in health/diet/appetite He has had  green beans 2 x last week and has had broccoli 4 times a week which is an increase.   He will also have mixed vegetables depending on other food available on the menu. (tani slaw, green beans, corn). He tries to keep Vit K intake consistent, but sometimes has trouble. --->has had less greens than normal ---> no vit k--> no change, no NVD--> diarrhea for about a week --> no vit k, still having diarrhea (believes it is from vaccine from 2 weeks ago)---> no greens, no appetite, lost a lot of weight, no NVD---> denies, no NVD   []   [x]       Change in alcohol use- denies   []   [x]       Change in activity  []   [x]       Hospital admission  [x]   []       Emergency department visit 8/7 for fall. No changes    []   [x]       Other complaints   []   [x]       Tobacco use  Stopped smoking as of Wed 11/28/19. Former smoked 2 1/2 ppd. Clinical Outcomes:  Yes     No  []   [x]       Major bleeding event  []   [x]       Thromboembolic event  Uses a pillbox  Duration of warfarin Therapy: indefinitely  INR Range:  2.0-3.0     INR is 3.0 again today  Continue taking dose of 7.5 mg on Mon, Wed and Fri and 5 mg all other days of the week. Encouraged patient to remain consistent with vit k intake.   Recheck INR in 4 weeks, 10/11    **Patient does NOT want a yellow highlighter** <---- refused AVS, wanted apt card only    Referring cardiologist: Dr. Thelma Early  INR (no units)   Date Value   09/13/2021 3.0   08/16/2021 3   07/23/2021 2.1   07/14/2021 0.93   06/29/2021 3.1   10/02/2020 1.03   07/31/2020 1.05   11/27/2019 5.16 ([facility])     For Pharmacy Admin Tracking Only     Total # of Interventions Recommended: 0   Total # of Interventions Accepted: 0   Time Spent (min): 15

## 2021-09-17 ENCOUNTER — HOSPITAL ENCOUNTER (OUTPATIENT)
Dept: CT IMAGING | Age: 76
Discharge: HOME | End: 2021-09-17
Payer: MEDICARE

## 2021-09-17 DIAGNOSIS — G44.52 NEW DAILY PERSISTENT HEADACHE: ICD-10-CM

## 2021-09-17 PROCEDURE — 70450 CT HEAD/BRAIN W/O DYE: CPT

## 2021-09-20 ENCOUNTER — HOSPITAL ENCOUNTER (OUTPATIENT)
Dept: NON INVASIVE DIAGNOSTICS | Age: 76
Discharge: HOME | End: 2021-09-20
Payer: MEDICARE

## 2021-09-20 DIAGNOSIS — I35.0 AORTIC VALVE STENOSIS, ETIOLOGY OF CARDIAC VALVE DISEASE UNSPECIFIED: ICD-10-CM

## 2021-09-20 LAB
LV EF: 58 %
LVEF MODALITY: NORMAL

## 2021-09-20 PROCEDURE — 93306 TTE W/DOPPLER COMPLETE: CPT

## 2021-09-23 ENCOUNTER — TELEPHONE (OUTPATIENT)
Dept: INTERNAL MEDICINE CLINIC | Age: 76
End: 2021-09-23

## 2021-09-23 ENCOUNTER — OFFICE VISIT (OUTPATIENT)
Dept: INTERNAL MEDICINE CLINIC | Age: 76
End: 2021-09-23
Payer: MEDICARE

## 2021-09-23 VITALS
TEMPERATURE: 98.5 F | BODY MASS INDEX: 29.35 KG/M2 | DIASTOLIC BLOOD PRESSURE: 60 MMHG | WEIGHT: 193 LBS | OXYGEN SATURATION: 95 % | SYSTOLIC BLOOD PRESSURE: 110 MMHG | HEART RATE: 68 BPM | RESPIRATION RATE: 16 BRPM

## 2021-09-23 DIAGNOSIS — R51.9 CHRONIC NONINTRACTABLE HEADACHE, UNSPECIFIED HEADACHE TYPE: ICD-10-CM

## 2021-09-23 DIAGNOSIS — G89.29 CHRONIC NONINTRACTABLE HEADACHE, UNSPECIFIED HEADACHE TYPE: ICD-10-CM

## 2021-09-23 DIAGNOSIS — Z72.0 TOBACCO ABUSE: ICD-10-CM

## 2021-09-23 DIAGNOSIS — M81.0 OSTEOPOROSIS, UNSPECIFIED OSTEOPOROSIS TYPE, UNSPECIFIED PATHOLOGICAL FRACTURE PRESENCE: Primary | ICD-10-CM

## 2021-09-23 PROCEDURE — 99214 OFFICE O/P EST MOD 30 MIN: CPT | Performed by: INTERNAL MEDICINE

## 2021-09-23 PROCEDURE — G8427 DOCREV CUR MEDS BY ELIG CLIN: HCPCS | Performed by: INTERNAL MEDICINE

## 2021-09-23 PROCEDURE — 4004F PT TOBACCO SCREEN RCVD TLK: CPT | Performed by: INTERNAL MEDICINE

## 2021-09-23 PROCEDURE — G8417 CALC BMI ABV UP PARAM F/U: HCPCS | Performed by: INTERNAL MEDICINE

## 2021-09-23 PROCEDURE — 4040F PNEUMOC VAC/ADMIN/RCVD: CPT | Performed by: INTERNAL MEDICINE

## 2021-09-23 PROCEDURE — 1123F ACP DISCUSS/DSCN MKR DOCD: CPT | Performed by: INTERNAL MEDICINE

## 2021-09-23 RX ORDER — ALENDRONATE SODIUM 70 MG/1
70 TABLET ORAL
Qty: 12 TABLET | Refills: 0 | Status: SHIPPED | OUTPATIENT
Start: 2021-09-23 | End: 2022-01-18

## 2021-09-23 RX ORDER — ALENDRONATE SODIUM 70 MG/1
70 TABLET ORAL
Qty: 12 TABLET | Refills: 0 | Status: SHIPPED | OUTPATIENT
Start: 2021-09-23 | End: 2021-09-23 | Stop reason: SDUPTHER

## 2021-09-23 RX ORDER — ALENDRONATE SODIUM 70 MG/1
70 TABLET ORAL
Qty: 4 TABLET | Refills: 3 | Status: SHIPPED | OUTPATIENT
Start: 2021-09-23 | End: 2021-09-23

## 2021-09-23 NOTE — TELEPHONE ENCOUNTER
Patient is requesting his prescription for:  alendronate (FOSAMAX) 70 MG tablet TAKE 1 TABLET BY MOUTH EVERY 7 DAYS     be Cancelled at [facility], and called to:     [address] [phone]    For a 3 month supply. Patient can be reached @ phone # provided should there be any questions.

## 2021-09-23 NOTE — TELEPHONE ENCOUNTER
Last appointment: 9/23/2021  Next appointment: 12/27/2021  Last refill: 90 day supply requested for Alendronate

## 2021-09-23 NOTE — PROGRESS NOTES
Aileen Garcia (:  1945) is a 67 y.o. male, here for evaluation of the following chief complaint(s):    Follow-up (Daily headaches still persist, some relief with Tylenol.)      ASSESSMENT/PLAN:  1. Osteoporosis, unspecified osteoporosis type, unspecified pathological fracture presence  Start alendronate. Discussed rationale, mechanism of action and how to take the medication. 2. Chronic nonintractable headache, unspecified headache type  -     [facility]FL - Sofia Montoya MD, Neurology, [facility]  Due to other medications he takes, difficult to decide on a good medication regimen. ---  Tobacco abuse  Ct Lungs due . Counseled to quit. Coronary artery calcification seen on CAT scan  On statin. Has not been on aspirin, perhaps because on Warfarin? Chronic obstructive pulmonary disease, unspecified COPD type (HCC)  Stable on Albuterol, Singulair, Wixela    Atrial fibrillation, unspecified type (HCC)  Stable on Warfarin, blood pressure on low side    Essential tremor  Stable on Primidone    Anxiety disorder-Sees Anusha Lujan. Stable on Prozac, hydroxyzine, melatonin, quetiapine, trazodone. Aortic stenosis -  echo.     Return in about 3 months (around 2021). SUBJECTIVE/OBJECTIVE:  HPI   Patient is here to review recent DEXA scan which shows osteoporosis. I suspect is due to his history of cigarette smoking and his history of bariatric surgery. Patient continues with daily headaches. He often takes 3 Tylenol per day. The headaches are frontal and he rates them an 8 out of 10. There are no associated symptoms. Flonase did not help. We reviewed his  head CT. We discussed the importance of stopping smoking but he is not interested in quitting.       Past Medical History:   Diagnosis Date    Allergic rhintis     Seasonal Hay Fever    Anxiety     Aortic stenosis     echo shows mod stenosis    Asthma     mixed w copd    Atrial fibrillation ([address]     takes warfarin due to cost, cardioversion 8/4/11, 7/1/11, 2/16/11, 9/23/10    Atrial flutter (HCC)     status post ablation-jack hare    Azoospermia     dx'd in teens    Bipolar 1 disorder [facility]     Chronic insomnia     Colon polyps     on colonoscopy    COPD (chronic obstructive pulmonary disease) ([facility]     Coronary artery calcification seen on CAT scan     [facility]    Essential tremor     dr Vitaly Aguilera Essential tremor     GERD (gastroesophageal reflux disease)     weaned off lansoprazole    Hiatal hernia     small, ugi-esophageal dysmotility    Hidradenitis suppurativa     perirectal    history of alcoholism     sober since 2008    Hyperlipidemia     Hypertension     Insomnia     Microcytic anemia 10/14/2010    iron infusions [facility], now resolved, due to malabsorption-dr mix    Obstructive sleep apnea     on bipap 17/11resolved 3 years ago    Osteoporosis 09/2021    dexa    Prostate nodule     dr Agus Donaldson, urology    Se[facility]ures [facility]     1973-One occurance. Unknown etiology. On Dilantin 1 year.     Tobacco abuse     annual chest ct in may    Tremor     primidone as per  neurology       Current Outpatient Medications   Medication Sig Dispense Refill    albuterol sulfate  (90 Base) MCG/ACT inhaler USE 2 INHALATIONS BY MOUTH  EVERY 6 HOURS AS NEEDED FOR WHEEZING 34 g 3    montelukast (SINGULAIR) 10 MG tablet Take 1 tablet by mouth nightly 90 tablet 1    vitamin B-12 (CYANOCOBALAMIN) 1000 MCG tablet Take 1,000 mcg by mouth daily      atorvastatin (LIPITOR) 40 MG tablet TAKE 1 TABLET BY MOUTH  DAILY 90 tablet 3    warfarin (COUMADIN) 5 MG tablet Take 5 mg by mouth four times a week Pt takes on Tue, Thur, Sat, Sun      hydrOXYzine (VISTARIL) 50 MG capsule Take 1 capsule by mouth 4 times daily as needed for Anxiety 120 capsule 0    traZODone (DESYREL) 150 MG tablet Take 150 mg by mouth every evening      WIXELA INHUB 250-50 MCG/DOSE AEPB Inhale 1 puff into the lungs every 12 hours 60 each 3    FLUoxetine (PROZAC) 20 MG capsule Take 3 capsules by mouth daily 45 capsule 0    Cholecalciferol (VITAMIN D3) 50 MCG (2000 UT) CAPS Take 4,000 Units by mouth daily       warfarin (COUMADIN) 5 MG tablet Take 1.5 tablets by mouth daily Take 7.5mg daily. (Patient taking differently: Take 7.5 mg by mouth three times a week Pt takes on M-W-F) 135 tablet 2    primidone (MYSOLINE) 250 MG tablet Take 750 mg by mouth nightly       QUEtiapine (SEROQUEL) 400 MG tablet Take 800 mg by mouth nightly      Melatonin 10 MG TABS Take 20 mg by mouth nightly      alendronate (FOSAMAX) 70 MG tablet Take 1 tablet by mouth every 7 days 12 tablet 0    zinc 50 MG CAPS Take by mouth daily        Current Facility-Administered Medications   Medication Dose Route Frequency Provider Last Rate Last Admin    triamcinolone acetonide (KENALOG) injection 3 mg  3 mg IntraMUSCular Once Héctor Singh MD           Physical Exam  Vitals reviewed. Constitutional:       General: He is not in acute distress. Cardiovascular:      Rate and Rhythm: Normal rate and regular rhythm. Comments: distant  Pulmonary:      Effort: Pulmonary effort is normal.      Comments: reduced  Neurological:      Mental Status: He is alert and oriented to person, place, and time. Mental status is at baseline. Psychiatric:         Mood and Affect: Mood normal.               This note was generated completely or in part utilizing Dragon dictation speech recognition software. Occasionally, words are mistranscribed and despite editing, the text may contain inaccuracies due to incorrect word recognition. If further clarification is needed please contact the office at [phone]          An electronic signature was used to authenticate this note.     --Reena Hogan MD

## 2021-09-23 NOTE — Clinical Note
Tell patient I placed referral to Dr. Johnny Calderon with [address] [place] [address] [address] Neurology to address his headaches if they persist. See referral.

## 2021-09-29 ENCOUNTER — TELEPHONE (OUTPATIENT)
Dept: INTERNAL MEDICINE CLINIC | Age: 76
End: 2021-09-29

## 2021-09-29 NOTE — TELEPHONE ENCOUNTER
----- Message from Lynne De La Rosa MD sent at 9/28/2021 10:06 PM EDT -----  Tell patient I placed referral to Dr. Jesus Umanzor with [address] [place] [address] [address] Neurology to address his headaches if they persist. See referral.

## 2021-09-29 NOTE — TELEPHONE ENCOUNTER
Detailed message left on answering machine with name and  Number to schedule. Referral faxed to [address] [place] [address] [address].   Note completed

## 2021-09-29 NOTE — PROGRESS NOTES
Spoke with patient to advise and he states he has his own neurologist at [facility] and will be seeing him next month and will discuss his headaches at that time.

## 2021-09-29 NOTE — TELEPHONE ENCOUNTER
Patient states he is returning a call to Jorge Navarro regarding seeing a Neurologist at [facility]. He states he already has a Neurologist he sees. He is requesting to speak with Jorge Navarro and can be reached @ phone # provided.

## 2021-10-04 ENCOUNTER — TELEPHONE (OUTPATIENT)
Dept: PHARMACY | Age: 76
End: 2021-10-04

## 2021-10-06 NOTE — TELEPHONE ENCOUNTER
Called Dr. Dieudonne Goodwin office and spoke with Velvet Dhillon. She states patient has an appt with them tomorrow and will have a nurse call me back regarding transfer of care for the INR management.

## 2021-10-15 ENCOUNTER — ANTI-COAG VISIT (OUTPATIENT)
Dept: PHARMACY | Age: 76
End: 2021-10-15

## 2021-10-15 NOTE — TELEPHONE ENCOUNTER
Called Dr. Demetrius Soulier office again and spoke with Uma Vallecillo. Uma Vallecillo took the information on transfer of care and will have a nurse call me back.

## 2021-10-15 NOTE — TELEPHONE ENCOUNTER
Jeyson Robb received a call from Berenice [address] @ Dr Dee Dee Linda office confirming their office is taking over the INR management for patient. Discharging patient from [address] [place] [address].

## 2021-11-06 ENCOUNTER — PATIENT MESSAGE (OUTPATIENT)
Dept: INTERNAL MEDICINE CLINIC | Age: 76
End: 2021-11-06

## 2021-11-08 RX ORDER — MONTELUKAST SODIUM 10 MG/1
10 TABLET ORAL NIGHTLY
Qty: 90 TABLET | Refills: 1 | Status: SHIPPED | OUTPATIENT
Start: 2021-11-08 | End: 2022-01-20

## 2021-11-08 NOTE — TELEPHONE ENCOUNTER
From: Amina Flanagan  To: Dr. Cordell Mendez: 11/6/2021 9:57 AM EDT  Subject: Prescription Question    I have been waiting for a refill of my prescription for Montelucast from [facility]. They said they have tried to reach you several times to get authorization for a 90 day supply. I am now out of the medication.   Kasandra Henderson

## 2021-11-18 ENCOUNTER — OFFICE VISIT (OUTPATIENT)
Dept: INTERNAL MEDICINE CLINIC | Age: 76
End: 2021-11-18
Payer: MEDICARE

## 2021-11-18 VITALS
WEIGHT: 193 LBS | DIASTOLIC BLOOD PRESSURE: 72 MMHG | SYSTOLIC BLOOD PRESSURE: 118 MMHG | HEART RATE: 65 BPM | HEIGHT: 68 IN | BODY MASS INDEX: 29.25 KG/M2 | OXYGEN SATURATION: 96 % | TEMPERATURE: 97.3 F

## 2021-11-18 DIAGNOSIS — I35.0 AORTIC VALVE STENOSIS, ETIOLOGY OF CARDIAC VALVE DISEASE UNSPECIFIED: ICD-10-CM

## 2021-11-18 DIAGNOSIS — R89.9 ABNORMAL LABORATORY TEST: Primary | ICD-10-CM

## 2021-11-18 PROCEDURE — 1123F ACP DISCUSS/DSCN MKR DOCD: CPT | Performed by: INTERNAL MEDICINE

## 2021-11-18 PROCEDURE — 4040F PNEUMOC VAC/ADMIN/RCVD: CPT | Performed by: INTERNAL MEDICINE

## 2021-11-18 PROCEDURE — 4004F PT TOBACCO SCREEN RCVD TLK: CPT | Performed by: INTERNAL MEDICINE

## 2021-11-18 PROCEDURE — 99214 OFFICE O/P EST MOD 30 MIN: CPT | Performed by: INTERNAL MEDICINE

## 2021-11-18 PROCEDURE — G8417 CALC BMI ABV UP PARAM F/U: HCPCS | Performed by: INTERNAL MEDICINE

## 2021-11-18 PROCEDURE — G8484 FLU IMMUNIZE NO ADMIN: HCPCS | Performed by: INTERNAL MEDICINE

## 2021-11-18 PROCEDURE — G8427 DOCREV CUR MEDS BY ELIG CLIN: HCPCS | Performed by: INTERNAL MEDICINE

## 2021-11-18 RX ORDER — GABAPENTIN 300 MG/1
300 CAPSULE ORAL 2 TIMES DAILY
COMMUNITY
Start: 2021-11-13 | End: 2021-11-22

## 2021-11-18 NOTE — PROGRESS NOTES
Leeanna Guzman (:  1945) is a 67 y.o. male, here for evaluation of the following chief complaint(s):    Discuss Labs      ASSESSMENT/PLAN:  1. Abnormal laboratory test  Reviewed his labs done over the past year. He had concerns about his kidney function. Told him that his creatinine and GFR have been normal.  Reviewed other labs on the [facility] chart which are overall normal except for elevated vitamin D.  2. Aortic valve stenosis, etiology of cardiac valve disease unspecified  Discussed his recent visit with cardiology who plans to monitor this every 6 months. ---  Osteoporosis, unspecified osteoporosis type, unspecified pathological fracture presence  Says he will start alendronate after January due to cost reasons. Chronic nonintractable headache, unspecified headache type  Doing much better with gabapentin. Asks if I will refill it in the future and I agreed. Anxiety disorder-Sees Elina Amaya. Stable on Prozac, hydroxyzine, melatonin, quetiapine, trazodone.   States his social anxiety is much better with that hydroxyzine. ---  Tobacco abuse  Ct Lungs due . Counseled to quit.     Coronary artery calcification seen on CAT scan  On statin. Has not been on aspirin, perhaps because on Warfarin?     Chronic obstructive pulmonary disease, unspecified COPD type (HCC)  Stable on Albuterol, Singulair, Wixela     Atrial fibrillation, unspecified type ([address]  Stable on Warfarin, blood pressure on low side, managed by Cardiology     Essential tremor  Stable on Primidone         Return in about 2 months (around 2022). SUBJECTIVE/OBJECTIVE:  HPI   Patient is here to discuss his recent blood work. He states the neurologist told him his kidney function was abnormal but in chart review does not not appear to be the case. He denies change in urination. We discussed his recent visit with cardiology regarding aortic stenosis.   He denies chest pain, increased shortness of breath, or dizziness. He also states that the addition of Vistaril has helped his social anxiety. Past Medical History:   Diagnosis Date    Allergic rhintis 1959    Seasonal Hay Fever    Anxiety     Aortic stenosis     echo shows mod stenosis    Asthma     mixed w copd    Atrial fibrillation (HCC)     takes warfarin due to cost, cardioversion 8/4/11, 7/1/11, 2/16/11, 9/23/10    Atrial flutter ([address]     status post ablation-jack hare    Azoospermia     dx'd in teens    Bipolar 1 disorder ([address]     Chronic insomnia     Colon polyps     on colonoscopy    COPD (chronic obstructive pulmonary disease) ([address]     Coronary artery calcification seen on CAT scan     Depression     [facility]    Essential tremor     dr Katheryn To Essential tremor     GERD (gastroesophageal reflux disease)     weaned off lansoprazole    Hiatal hernia     small, ugi-esophageal dysmotility    Hidradenitis suppurativa     perirectal    history of alcoholism     sober since 2008    Hyperlipidemia     Hypertension     Insomnia     Microcytic anemia 10/14/2010    iron infusions [facility], now resolved, due to malabsorption-dr mix    Obstructive sleep apnea     on bipap 17/11resolved 3 years ago    Osteoporosis 09/2021    dexa    Prostate nodule     dr Siddharth Sweet, urology    Seizures ([facility]     1973-One occurance. Unknown etiology. On Dilantin 1 year.  Tobacco abuse     annual chest ct in may    Tremor     primidone as per  neurology       Current Outpatient Medications   Medication Sig Dispense Refill    montelukast (SINGULAIR) 10 MG tablet Take 1 tablet by mouth nightly 90 tablet 1    warfarin (COUMADIN) 5 MG tablet Take 1 tablet by mouth four times a week AND 1.5 tablets three times a week. Take 7.5 mg (5 mg x 1.5) every Mon, Wed, Fri; 5 mg (5 mg x 1) all other days as directed by [facility] CC. Elisabeth Amor  102 tablet 2    albuterol sulfate  (90 Base) MCG/ACT inhaler USE 2 INHALATIONS BY MOUTH  EVERY 6 HOURS AS NEEDED FOR WHEEZING 34 g 3    vitamin B-12 (CYANOCOBALAMIN) 1000 MCG tablet Take 1,000 mcg by mouth daily      atorvastatin (LIPITOR) 40 MG tablet TAKE 1 TABLET BY MOUTH  DAILY 90 tablet 3    hydrOXYzine (VISTARIL) 50 MG capsule Take 1 capsule by mouth 4 times daily as needed for Anxiety 120 capsule 0    traZODone (DESYREL) 150 MG tablet Take 150 mg by mouth every evening      WIXELA INHUB 250-50 MCG/DOSE AEPB Inhale 1 puff into the lungs every 12 hours 60 each 3    FLUoxetine (PROZAC) 20 MG capsule Take 3 capsules by mouth daily 45 capsule 0    Cholecalciferol (VITAMIN D3) 50 MCG (2000 UT) CAPS Take 4,000 Units by mouth daily       primidone (MYSOLINE) 250 MG tablet Take 750 mg by mouth nightly       QUEtiapine (SEROQUEL) 400 MG tablet Take 800 mg by mouth nightly      zinc 50 MG CAPS Take by mouth daily       Melatonin 10 MG TABS Take 20 mg by mouth nightly      gabapentin (NEURONTIN) 300 MG capsule Take 300 mg by mouth 2 times daily.  alendronate (FOSAMAX) 70 MG tablet Take 1 tablet by mouth every 7 days 12 tablet 0     Current Facility-Administered Medications   Medication Dose Route Frequency Provider Last Rate Last Admin    triamcinolone acetonide (KENALOG) injection 3 mg  3 mg IntraMUSCular Once Rosa MD Joann           Physical Exam  Vitals reviewed. HENT:      Head: Normocephalic and atraumatic. Neurological:      Mental Status: He is alert. Mental status is at baseline. Psychiatric:         Mood and Affect: Mood normal.           On this date 11/18/2021 I have spent 30 minutes reviewing previous notes, test results and face to face with the patient discussing the diagnosis and importance of compliance with the treatment plan as well as documenting on the day of the visit. This note was generated completely or in part utilizing Dragon dictation speech recognition software.   Occasionally, words are mistranscribed and despite editing, the text may contain inaccuracies due to incorrect

## 2021-11-21 ENCOUNTER — PATIENT MESSAGE (OUTPATIENT)
Dept: INTERNAL MEDICINE CLINIC | Age: 76
End: 2021-11-21

## 2021-11-22 RX ORDER — GABAPENTIN 300 MG/1
300 CAPSULE ORAL 3 TIMES DAILY
Qty: 90 CAPSULE | Refills: 1
Start: 2021-11-22 | End: 2021-11-23

## 2021-11-22 NOTE — TELEPHONE ENCOUNTER
From: Mery Flanagan  To: Dr. Hall Radha: 11/21/2021 2:16 PM EST  Subject: Gabapentin    Im getting some relief from Gabapentin 300mg bid Would I be able to change it to 300mg tid?   Susy Page

## 2021-11-23 RX ORDER — GABAPENTIN 300 MG/1
300 CAPSULE ORAL 3 TIMES DAILY
Qty: 270 CAPSULE | Refills: 0 | Status: SHIPPED | OUTPATIENT
Start: 2021-11-23 | End: 2021-11-29 | Stop reason: SDUPTHER

## 2021-11-25 ENCOUNTER — PATIENT MESSAGE (OUTPATIENT)
Dept: INTERNAL MEDICINE CLINIC | Age: 76
End: 2021-11-25

## 2021-11-29 RX ORDER — GABAPENTIN 300 MG/1
300 CAPSULE ORAL 3 TIMES DAILY
Qty: 270 CAPSULE | Refills: 0 | Status: SHIPPED | OUTPATIENT
Start: 2021-11-29 | End: 2022-01-28

## 2021-11-29 NOTE — TELEPHONE ENCOUNTER
From: Homer Flanagan  To: Dr. Amador Rear: 11/25/2021 9:30 AM EST  Subject: Prescription     [facility] has yet to receive your order got Gabapentin 300mg tid. Would appreciate it if you will take care of it.    Poncho Clements

## 2021-12-09 ENCOUNTER — OFFICE VISIT (OUTPATIENT)
Dept: DERMATOLOGY | Age: 76
End: 2021-12-09
Payer: MEDICARE

## 2021-12-09 VITALS — TEMPERATURE: 97.4 F

## 2021-12-09 DIAGNOSIS — L30.0 NUMMULAR DERMATITIS: ICD-10-CM

## 2021-12-09 DIAGNOSIS — L82.1 SK (SEBORRHEIC KERATOSIS): Primary | ICD-10-CM

## 2021-12-09 DIAGNOSIS — L73.2 HIDRADENITIS SUPPURATIVA: ICD-10-CM

## 2021-12-09 DIAGNOSIS — K13.0 ANGULAR CHEILITIS: ICD-10-CM

## 2021-12-09 PROCEDURE — 99214 OFFICE O/P EST MOD 30 MIN: CPT | Performed by: DERMATOLOGY

## 2021-12-09 PROCEDURE — G8417 CALC BMI ABV UP PARAM F/U: HCPCS | Performed by: DERMATOLOGY

## 2021-12-09 PROCEDURE — G8484 FLU IMMUNIZE NO ADMIN: HCPCS | Performed by: DERMATOLOGY

## 2021-12-09 PROCEDURE — 1123F ACP DISCUSS/DSCN MKR DOCD: CPT | Performed by: DERMATOLOGY

## 2021-12-09 PROCEDURE — 4040F PNEUMOC VAC/ADMIN/RCVD: CPT | Performed by: DERMATOLOGY

## 2021-12-09 PROCEDURE — 4004F PT TOBACCO SCREEN RCVD TLK: CPT | Performed by: DERMATOLOGY

## 2021-12-09 PROCEDURE — G8427 DOCREV CUR MEDS BY ELIG CLIN: HCPCS | Performed by: DERMATOLOGY

## 2021-12-09 NOTE — PROGRESS NOTES
[facility]  Brent Horner MD  [phone]      Sheridan Flanagan  [birth year]    67 y.o. male     Date of Visit: 12/9/2021    Chief Complaint: skin lesions, dermatitis, hidradenitis     History of Present Illness:    1. He complains of an asymptomatic growth on the scalp. 2.  Follow-up for history of angular cheilitisreports minimal improvement with use of nystatin cream.    3.  He has a history of nummular dermatitis - has been quiescent. Doing well with daily use of emollients. 4.  He also has a history of hidradenitis suppurativa that has been relatively quiescent. Review of Systems:  Gen: Feels well, good sense of health. Skin: No new or changing moles. Past Medical History, Family History, Surgical History, Medications and Allergies reviewed.     Past Medical History:   Diagnosis Date    Allergic rhintis 1959    Seasonal Hay Fever    Anxiety     Aortic stenosis     echo shows mod stenosis    Asthma     mixed w copd    Atrial fibrillation (HCC)     takes warfarin due to cost, cardioversion 8/4/11, 7/1/11, 2/16/11, 9/23/10    Atrial flutter ([address]     status post ablation-jack hare    Azoospermia     dx'd in teens    Bipolar 1 disorder ([address]     Chronic insomnia     Colon polyps     on colonoscopy    COPD (chronic obstructive pulmonary disease) ([address]     Coronary artery calcification seen on CAT scan     [facility]    Essential tremor     dr Trang Pierce Essential tremor     GERD (gastroesophageal reflux disease)     weaned off lansoprazole    Hiatal hernia     small, ugi-esophageal dysmotility    Hidradenitis suppurativa     perirectal    history of alcoholism     sober since 2008    Hyperlipidemia     Hypertension     Insomnia     Microcytic anemia 10/14/2010    iron infusions [facility], now resolved, due to malabsorption-dr mix    Obstructive sleep apnea     on bipap 17/11resolved 3 years ago    Osteoporosis 09/2021    dexa  Prostate nodule     dr Adolfo Simon, urology    Seizures [facility])     1973-One occurance. Unknown etiology. On Dilantin 1 year.  Tobacco abuse     annual chest ct in may    Tremor     [facility]     Past Surgical History:   Procedure Laterality Date    ABDOMEN SURGERY      gastric band    [place]    ATRIAL ABLATION SURGERY  08/05/2011    atrial flutter ablation    ATRIAL ABLATION SURGERY  09/01/2011    cryoablation for atrial fibrillation    ATRIAL ABLATION SURGERY  09/03/2015    RFCA for AF with PVI    BARIATRIC SURGERY  08/05/2013    band over bypass    CARDIOVERSION  x4    CATARACT REMOVAL Bilateral 2009    [facility], LAPAROSCOPIC N/A 06/20/2016    LAPAROSCOPIC CHOLECYSTECTOMY WITH CHOLANGIOGRAM    COLONOSCOPY  10/01/2009    [place], repeat 3 years    COLONOSCOPY N/A 07/31/2020    COLONOSCOPY POLYPECTOMY SNARE/COLD BIOPSY performed by Gustabo Kirkland MD at [address] [place] [address] COLONOSCOPY N/A 07/14/2021    COLONOSCOPY POLYPECTOMY SNARE/COLD BIOPSY performed by Gustabo Kirkland MD at [place]      from chest    HEMORRHOID SURGERY  03/23/2016    HERNIA REPAIR      Repaired in conjunction with Band Over Bypass, 2013.     OTHER SURGICAL HISTORY  09/21/2015    excision vivian rectal cyst    PROSTATE BIOPSY  04/2021    neg    DIMITRIS-EN-[facility]STRIC [facility]PASS  [facility]    UPPER [facility]    UPPER GASTROINTESTINAL ENDOSCOPY N/A 07/31/2020    EGD BIOPSY performed by Gustabo Kirkland MD at [address] N/A 10/02/2020    EGD DIAGNOSTIC ONLY performed by Gustabo Kirkland MD at [address]  04/2021    UPPER GASTROINTESTINAL ENDOSCOPY  07/28/2021       No Known Allergies  Outpatient Medications Marked as Taking for the 12/9/21 encounter (Office Visit) with Abdiaziz Mejias MD   Medication Sig Dispense Refill    gabapentin (NEURONTIN) 300 MG capsule Take 1 capsule by mouth 3 times daily for 90 days. 270 capsule 0    montelukast (SINGULAIR) 10 MG tablet Take 1 tablet by mouth nightly 90 tablet 1    warfarin (COUMADIN) 5 MG tablet Take 1 tablet by mouth four times a week AND 1.5 tablets three times a week. Take 7.5 mg (5 mg x 1.5) every Mon, Wed, Fri; 5 mg (5 mg x 1) all other days as directed by [facility] CC. Sabrina Rast 102 tablet 2    alendronate (FOSAMAX) 70 MG tablet Take 1 tablet by mouth every 7 days 12 tablet 0    albuterol sulfate  (90 Base) MCG/ACT inhaler USE 2 INHALATIONS BY MOUTH  EVERY 6 HOURS AS NEEDED FOR WHEEZING 34 g 3    vitamin B-12 (CYANOCOBALAMIN) 1000 MCG tablet Take 1,000 mcg by mouth daily      atorvastatin (LIPITOR) 40 MG tablet TAKE 1 TABLET BY MOUTH  DAILY 90 tablet 3    hydrOXYzine (VISTARIL) 50 MG capsule Take 1 capsule by mouth 4 times daily as needed for Anxiety 120 capsule 0    traZODone (DESYREL) 150 MG tablet Take 150 mg by mouth every evening      WIXELA INHUB 250-50 MCG/DOSE AEPB Inhale 1 puff into the lungs every 12 hours 60 each 3    FLUoxetine (PROZAC) 20 MG capsule Take 3 capsules by mouth daily 45 capsule 0    Cholecalciferol (VITAMIN D3) 50 MCG (2000 UT) CAPS Take 4,000 Units by mouth daily       primidone (MYSOLINE) 250 MG tablet Take 750 mg by mouth nightly       QUEtiapine (SEROQUEL) 400 MG tablet Take 800 mg by mouth nightly      zinc 50 MG CAPS Take by mouth daily       Melatonin 10 MG TABS Take 20 mg by mouth nightly           Physical Examination       The following were examined and determined to be normal: Psych/Neuro, Scalp/hair, Conjunctivae/eyelids, Gums/teeth/lips, Neck, Breast/axilla/chest, Abdomen, Back, RUE, LUE, RLE, LLE and Nails/digits. The following were examined and determined to be abnormal: Head/face. Well-appearing.     1.  Right frontal scalp with a stuck on appearing verrucous brown papule. 2.  Melomental folds extending to the angles of the mouth with macerated erythematous patches. 3.  Clear. 4.  Exam deferred. Assessment and Plan     1. SK (seborrheic keratosis)     Reassurance. 2. Angular cheilitis -no change    Offered a prescription for Mycolog ointment the patient declined today. Will consider in the future. 3. Nummular dermatitis -quiescent right now. Continue daily use of emollients. Consider resuming clobetasol cream twice daily as needed for recurrences. 4. Hidradenitis suppurativa - no active lesions    Reassurance. Return in about 6 months (around 6/9/2022).     --Abdiaziz Mejias MD

## 2022-01-06 ENCOUNTER — TELEPHONE (OUTPATIENT)
Dept: INTERNAL MEDICINE CLINIC | Age: 77
End: 2022-01-06

## 2022-01-06 NOTE — TELEPHONE ENCOUNTER
Patient called  to have orders placed for Covid testing he will have this done at [address] as he is not having symptoms and cannot be scheduled here unless he is. He was exposed a week ago.

## 2022-01-06 NOTE — TELEPHONE ENCOUNTER
This patient has no symptoms does he need to be tested at all??  I will forward this to Dr Maik Hardy for further advice

## 2022-01-07 DIAGNOSIS — Z20.822 CLOSE EXPOSURE TO COVID-19 VIRUS: ICD-10-CM

## 2022-01-08 LAB — SARS-COV-2: NOT DETECTED

## 2022-01-09 ENCOUNTER — HOSPITAL ENCOUNTER (EMERGENCY)
Age: 77
Discharge: HOME | End: 2022-01-10
Payer: MEDICARE

## 2022-01-09 ENCOUNTER — APPOINTMENT (OUTPATIENT)
Dept: GENERAL RADIOLOGY | Age: 77
End: 2022-01-09
Payer: MEDICARE

## 2022-01-09 DIAGNOSIS — K59.00 CONSTIPATION, UNSPECIFIED CONSTIPATION TYPE: ICD-10-CM

## 2022-01-09 DIAGNOSIS — R33.8 ENLARGED PROSTATE WITH URINARY RETENTION: Primary | ICD-10-CM

## 2022-01-09 DIAGNOSIS — N40.1 ENLARGED PROSTATE WITH URINARY RETENTION: Primary | ICD-10-CM

## 2022-01-09 LAB
ANION GAP SERPL CALCULATED.3IONS-SCNC: 14 MMOL/L (ref 3–16)
BASOPHILS ABSOLUTE: 0 K/UL (ref 0–0.2)
BASOPHILS RELATIVE PERCENT: 0.3 %
BILIRUBIN URINE: ABNORMAL
BLOOD, URINE: NEGATIVE
BUN BLDV-MCNC: 12 MG/DL (ref 7–20)
CALCIUM SERPL-MCNC: 8.7 MG/DL (ref 8.3–10.6)
CHLORIDE BLD-SCNC: 95 MMOL/L (ref 99–110)
CLARITY: CLEAR
CO2: 19 MMOL/L (ref 21–32)
COLOR: ABNORMAL
CREAT SERPL-MCNC: 0.6 MG/DL (ref 0.8–1.3)
EOSINOPHILS ABSOLUTE: 0.1 K/UL (ref 0–0.6)
EOSINOPHILS RELATIVE PERCENT: 0.5 %
GFR AFRICAN AMERICAN: >60
GFR NON-AFRICAN AMERICAN: >60
GLUCOSE BLD-MCNC: 122 MG/DL (ref 70–99)
GLUCOSE URINE: NEGATIVE MG/DL
HCT VFR BLD CALC: 47.6 % (ref 40.5–52.5)
HEMOGLOBIN: 15.7 G/DL (ref 13.5–17.5)
KETONES, URINE: NEGATIVE MG/DL
LEUKOCYTE ESTERASE, URINE: NEGATIVE
LYMPHOCYTES ABSOLUTE: 0.9 K/UL (ref 1–5.1)
LYMPHOCYTES RELATIVE PERCENT: 6.1 %
MCH RBC QN AUTO: 28.9 PG (ref 26–34)
MCHC RBC AUTO-ENTMCNC: 33 G/DL (ref 31–36)
MCV RBC AUTO: 87.6 FL (ref 80–100)
MICROSCOPIC EXAMINATION: ABNORMAL
MONOCYTES ABSOLUTE: 1.1 K/UL (ref 0–1.3)
MONOCYTES RELATIVE PERCENT: 7.1 %
NEUTROPHILS ABSOLUTE: 12.7 K/UL (ref 1.7–7.7)
NEUTROPHILS RELATIVE PERCENT: 86 %
NITRITE, URINE: NEGATIVE
PDW BLD-RTO: 16.7 % (ref 12.4–15.4)
PH UA: 6.5 (ref 5–8)
PLATELET # BLD: 334 K/UL (ref 135–450)
PMV BLD AUTO: 7.9 FL (ref 5–10.5)
POTASSIUM REFLEX MAGNESIUM: 4.4 MMOL/L (ref 3.5–5.1)
PROTEIN UA: NEGATIVE MG/DL
RBC # BLD: 5.44 M/UL (ref 4.2–5.9)
SODIUM BLD-SCNC: 128 MMOL/L (ref 136–145)
SPECIFIC GRAVITY UA: 1.02 (ref 1–1.03)
URINE REFLEX TO CULTURE: ABNORMAL
URINE TYPE: ABNORMAL
UROBILINOGEN, URINE: 1 E.U./DL
WBC # BLD: 14.8 K/UL (ref 4–11)

## 2022-01-09 PROCEDURE — 81003 URINALYSIS AUTO W/O SCOPE: CPT

## 2022-01-09 PROCEDURE — 74018 RADEX ABDOMEN 1 VIEW: CPT

## 2022-01-09 PROCEDURE — 99285 EMERGENCY DEPT VISIT HI MDM: CPT

## 2022-01-09 PROCEDURE — 51702 INSERT TEMP BLADDER CATH: CPT

## 2022-01-09 PROCEDURE — 85025 COMPLETE CBC W/AUTO DIFF WBC: CPT

## 2022-01-09 PROCEDURE — 80048 BASIC METABOLIC PNL TOTAL CA: CPT

## 2022-01-09 PROCEDURE — 36415 COLL VENOUS BLD VENIPUNCTURE: CPT

## 2022-01-09 PROCEDURE — 51798 US URINE CAPACITY MEASURE: CPT

## 2022-01-09 ASSESSMENT — PAIN SCALES - GENERAL
PAINLEVEL_OUTOF10: 10
PAINLEVEL_OUTOF10: 0

## 2022-01-09 NOTE — ED PROVIDER NOTES
[facility]        Pt Name: Sohan Bell  MRN: [id number]  [place] [birth year]  Date of evaluation: 1/9/2022  Provider: Joseph Pham PA-C  PCP: Mckay Vega MD  Note Started: 6:25 PM EST       RUSTAM. I have evaluated this patient. My supervising physician was available for consultation. Vaishali Shay MD      CHIEF COMPLAINT       Chief Complaint   Patient presents with    Urinary Retention     states unable to urinate since yesterday, constipation and believes his rectum is prolapsed also states all over body shakes       HISTORY OF PRESENT ILLNESS   (Location, Timing/Onset, Context/Setting, Quality, Duration, Modifying Factors, Severity, Associated Signs and Symptoms)  Note limiting factors. Chief Complaint: Unable to urinate    Sohan Bell is a 67 y.o. male who presents with complaint unable to urinate. Last urinated yesterday. No urination today. Suprapubic pressure. Urge to urinate but unable to do so. No received call medication. He is on Flomax. Likely has BPH. He has not had this happen previously. He indicates no nausea, vomiting, diarrhea or constipation. No chest pain or shortness of breath    Nursing Notes were all reviewed and agreed with or any disagreements were addressed in the HPI. REVIEW OF SYSTEMS    (2-9 systems for level 4, 10 or more for level 5)     Review of Systems    Positives and Pertinent negatives as per HPI. Except as noted above in the ROS, all other systems were reviewed and negative.        PAST MEDICAL HISTORY     Past Medical History:   Diagnosis Date    Allergic rhintis 1959    Seasonal Hay Fever    Anxiety     Aortic stenosis     echo shows mod stenosis    Asthma     mixed w copd    Atrial fibrillation ([address]     takes warfarin due to cost, cardioversion 8/4/11, 7/1/11, 2/16/11, 9/23/10    Atrial flutter ([address]     status post ablation-jack hare  Azoospermia     dx'd in teens   [place] Bipolar 1 disorder ([facility]     Chronic insomnia     Colon polyps     on colonoscopy    COPD (chronic obstructive pulmonary disease) (HCC)     Coronary artery calcification seen on CAT scan     Depression     [facility]    Essential tremor     dr Mcdowell Angry Essential tremor     GERD (gastroesophageal reflux disease)     weaned off lansoprazole    Hiatal hernia     small, ugi-esophageal dysmotility    Hidradenitis suppurativa     perirectal    history of alcoholism     sober since 2008    Hyperlipidemia     Hypertension     Insomnia     Microcytic anemia 10/14/2010    iron infusions [facility], now resolved, due to malabsorption-dr mix    Obstructive sleep apnea     on bipap 17/11resolved 3 years ago    Osteoporosis 09/2021    dexa    Prostate nodule     dr Padmini Sherman, urology    Seizures ([facility]     1973-One occurance. Unknown etiology. On Dilantin 1 year.     Tobacco abuse     annual chest ct in may    Tremor     primidone as per  neurology         SURGICAL HISTORY     Past Surgical History:   Procedure Laterality Date    ABDOMEN SURGERY      gastric band    [place]    ATRIAL ABLATION SURGERY  08/05/2011    atrial flutter ablation    ATRIAL ABLATION SURGERY  09/01/2011    cryoablation for atrial fibrillation    ATRIAL ABLATION SURGERY  09/03/2015    RFCA for AF with PVI    BARIATRIC SURGERY  08/05/2013    band over bypass    CARDIOVERSION  x4    CATARACT REMOVAL Bilateral 2009    [facility], LAPAROSCOPIC N/A 06/20/2016    LAPAROSCOPIC CHOLECYSTECTOMY WITH CHOLANGIOGRAM    COLONOSCOPY  10/01/2009    Ke[place], repeat 3 years    COLONOSCOPY N/A 07/31/2020    COLONOSCOPY POLYPECTOMY SNARE/COLD BIOPSY performed by Mehul Elizabeth MD at [address] [place] [address] COLONOSCOPY N/A 07/14/2021    COLONOSCOPY POLYPECTOMY SNARE/COLD BIOPSY performed by Mehul Elizabeth MD at [place] from chest    HEMORRHOID SURGERY  03/23/2016    HERNIA REPAIR      Repaired in conjunction with Band Over Bypass, 2013.  OTHER SURGICAL HISTORY  09/21/2015    excision vivian rectal cyst    PROSTATE BIOPSY  04/2021    neg    DIMITRIS-EN-Y GASTRIC BYPASS  2001    [facility]  [place]    UPPER GASTROINTESTINAL ENDOSCOPY  05/13/2013    [facility]    UPPER GASTROINTESTINAL ENDOSCOPY N/A 07/31/2020    EGD BIOPSY performed by Ryan Monzon MD at [address] N/A 10/02/2020    EGD DIAGNOSTIC ONLY performed by Ryan Monzon MD at [address]  04/2021    UPPER GASTROINTESTINAL ENDOSCOPY  07/28/2021         [address]       Discharge Medication List as of 1/9/2022 11:41 PM      CONTINUE these medications which have NOT CHANGED    Details   gabapentin (NEURONTIN) 300 MG capsule Take 1 capsule by mouth 3 times daily for 90 days. , Disp-270 capsule, R-0Normal      montelukast (SINGULAIR) 10 MG tablet Take 1 tablet by mouth nightly, Disp-90 tablet, R-1Normal      warfarin (COUMADIN) 5 MG tablet Take 1 tablet by mouth four times a week AND 1.5 tablets three times a week. Take 7.5 mg (5 mg x 1.5) every Mon, Wed, Fri; 5 mg (5 mg x 1) all other days as directed by [facility]. ., Disp-102 tablet, R-2Requesting 1 year supplyNormal      alendronate (FOSAMAX) 70 MG tablet Take 1 tablet by mouth every 7 days, Disp-12 tablet, R-0**Patient requests 90 days supply**Normal      albuterol sulfate  (90 Base) MCG/ACT inhaler USE 2 INHALATIONS BY MOUTH  EVERY 6 HOURS AS NEEDED FOR WHEEZING, Disp-34 g, R-3Requesting 1 year supplyNormal      vitamin B-12 (CYANOCOBALAMIN) 1000 MCG tablet Take 1,000 mcg by mouth dailyHistorical Med      atorvastatin (LIPITOR) 40 MG tablet TAKE 1 TABLET BY MOUTH  DAILY, Disp-90 tablet, R-3Requesting 1 year supplyNormal      hydrOXYzine (VISTARIL) 50 MG capsule Take 1 capsule by mouth 4 times daily as needed for Anxiety, Disp-120 capsule, R-0Historical Med      traZODone (DESYREL) 150 MG tablet Take 150 mg by mouth every eveningHistorical Med      WIXELA INHUB 250-50 MCG/DOSE AEPB Inhale 1 puff into the lungs every 12 hours, Disp-60 each, R-3, DAWNormal      FLUoxetine (PROZAC) 20 MG capsule Take 3 capsules by mouth daily, Disp-45 capsule, R-0Adjust Sig      Cholecalciferol (VITAMIN D3) 50 MCG (2000 UT) CAPS Take 4,000 Units by mouth daily Historical Med      primidone (MYSOLINE) 250 MG tablet Take 750 mg by mouth nightly Historical Med      QUEtiapine (SEROQUEL) 400 MG tablet Take 800 mg by mouth nightlyHistorical Med      zinc 50 MG CAPS Take by mouth daily Historical Med      Melatonin 10 MG TABS Take 20 mg by mouth nightly               ALLERGIES     Patient has no known allergies. FAMILYHISTORY       Family History   Problem Relation Age of Onset   [address] Migraines Mother     Emphysema Mother     Depression Mother         ECT; meds.     Heart Disease Mother         Mitral Valve Replacement    Heart Disease Father     Hypertension Father     Alcohol Abuse Father     High Blood Pressure Father     High Cholesterol Father         Treated with meds    Stroke Father         AHD    Substance Abuse Father         ETOH Abuse          SOCIAL HISTORY       Social History     Tobacco Use    Smoking status: Current Every Day Smoker     Packs/day: 3.00     Years: 50.00     Pack years: 150.00     Start date: 1959     Last attempt to quit: 2009     Years since quittin.4    Smokeless tobacco: Never Used    Tobacco comment: 1 ppd on average, more so during covid   Vaping Use    Vaping Use: Never used   Substance Use Topics    Alcohol use: Not Currently    Drug use: No       SCREENINGS    Furman Coma Scale  Eye Opening: Spontaneous  Best Verbal Response: Oriented  Best Motor Response: Obeys commands  Furman Coma Scale Score: 15        PHYSICAL EXAM    (up to 7 for level 4, 8 or more for components:    Sodium 128 (*)     Chloride 95 (*)     CO2 19 (*)     Glucose 122 (*)     CREATININE 0.6 (*)     All other components within normal limits    Narrative:     Performed at:  [facility] [address],  [facility], [address]   Phone [phone]   URINE RT REFLEX TO CULTURE - Abnormal; Notable for the following components:    Bilirubin Urine SMALL (*)     All other components within normal limits    Narrative:     Performed at:  [facility] [address],  [facility], [address]   Phone [phone]       When ordered only abnormal lab results are displayed. All other labs were within normal range or not returned as of this dictation. EKG: When ordered, EKG's are interpreted by the Emergency Department Physician in the absence of a cardiologist.  Please see their note for interpretation of EKG. RADIOLOGY:   Non-plain film images such as CT, Ultrasound and MRI are read by the radiologist. Plain radiographic images are visualized and preliminarily interpreted by the ED Provider with the below findings:        Interpretation per the Radiologist below, if available at the time of this note:    XR ABDOMEN (KUB) (SINGLE AP VIEW)   Final Result      Possible degree of constipation in the area of the splenic flexure of the   colon. Otherwise nonspecific bowel gas pattern. No results found. PROCEDURES   Unless otherwise noted below, none     Procedures    CRITICAL CARE TIME   N/A    CONSULTS:  None      EMERGENCY DEPARTMENT COURSE and DIFFERENTIAL DIAGNOSIS/MDM:   Vitals:    Vitals:    01/09/22 1806 01/09/22 2319 01/09/22 2329   BP: 130/76 86/66 132/73   Pulse: 71  86   Resp: 16  16   Temp: 98.9 °F (37.2 °C)     SpO2: 92% 96% 97%       Patient was given the following medications:  Medications - No data to display        Patient presented with urinary retention. Escobar catheter placed. Initial 760 mL urine drained. Ultimately patient total approximately 1500 mL urine. Patient improved. Patient with constipation issues. Had multiple BMs while in room 12. Patient feeling overall improved. I did evaluate his anal area. No prolapse as patient had expressed occurred a few days ago. Overall patient is improved. He is aware to contact urology for follow-up appointment. Catheter will remain in place and leg bag attached. There is some minor bleeding at the meatus which was controlled by loosening the adhesive like attachment for increase room. No evidence UTI. Patient be discharged in good condition and follow-up with urology. The patient did express understanding was diagnosis and the treatment plan. WBC a bit elevated 14.2 and I suspect related to the bladder distention stress. FINAL IMPRESSION      1. Enlarged prostate with urinary retention    2. Constipation, unspecified constipation type          DISPOSITION/PLAN   DISPOSITION Decision To Discharge 01/09/2022 11:37:27 PM      PATIENT REFERRED TO:  Mery Betts MD  [facility]  [address]  [phone]    Schedule an appointment as soon as possible for a visit in 2 days      Kaitlin Dejesus MD  [address] [place] [address]  [phone]    Schedule an appointment as soon as possible for a visit   As needed    [facility] Emergency Department  [facility] [address]  [phone]  Go to   If symptoms worsen      DISCHARGE MEDICATIONS:  Discharge Medication List as of 1/9/2022 11:41 PM          DISCONTINUED MEDICATIONS:  Discharge Medication List as of 1/9/2022 11:41 PM                 (Please note that portions of this note were completed with a voice recognition program.  Efforts were made to edit the dictations but occasionally words are mis-transcribed. )    Georgia Land PA-C (electronically signed)           Georgia Land PA-C  01/10/22 1686

## 2022-01-10 ENCOUNTER — TELEPHONE (OUTPATIENT)
Dept: INTERNAL MEDICINE CLINIC | Age: 77
End: 2022-01-10

## 2022-01-10 VITALS
RESPIRATION RATE: 16 BRPM | DIASTOLIC BLOOD PRESSURE: 73 MMHG | SYSTOLIC BLOOD PRESSURE: 132 MMHG | HEART RATE: 86 BPM | OXYGEN SATURATION: 97 % | TEMPERATURE: 98.9 F

## 2022-01-10 DIAGNOSIS — D72.819 LEUKOPENIA, UNSPECIFIED TYPE: ICD-10-CM

## 2022-01-10 DIAGNOSIS — E87.1 HYPONATREMIA: Primary | ICD-10-CM

## 2022-01-10 NOTE — ED NOTES
Pt had bleeding from around insertion site. Pt cleaned, dumont draining well, urine yellow/straw colored. PA aware and evaluated pt. Switched to leg bag for dc. Pt had multiple BM's and reports improved symptoms. Pt given all discharge and follow-up instructions verbally and in writing. Pt verbalized understanding and preformed return demonstration for dumont care. Pt left care area without incident accompanied by friend.       Lyda Schirmer, RN  01/10/22 3235

## 2022-01-11 NOTE — TELEPHONE ENCOUNTER
Spoke to patient and gave him the below information. I offered him sooner appointment but he explained that he relies on transportation and this is the soonest he can get here. He will call if he needs anything further.   Note completed

## 2022-01-11 NOTE — TELEPHONE ENCOUNTER
Please tell patient I reviewed his recent ER visit. Try to see me sooner than 1/20 to FU. There were some abnormal labs that need to be rechecked. If cant see me sooner, at least get the labs. They are ordered.

## 2022-01-18 RX ORDER — ALENDRONATE SODIUM 70 MG/1
70 TABLET ORAL
Qty: 12 TABLET | Refills: 3 | Status: SHIPPED | OUTPATIENT
Start: 2022-01-18 | End: 2022-05-31 | Stop reason: SDUPTHER

## 2022-01-19 NOTE — PROGRESS NOTES
Evelia Pineda (:  1945) is a 67 y.o. male, here for evaluation of the following chief complaint(s):    3 Month Follow-Up      ASSESSMENT/PLAN:  1. Chronic obstructive pulmonary disease, unspecified COPD type (HCC)  Stable on Albuterol, Singulair, Wixela    2. Congestive heart failure, unspecified HF chronicity, unspecified heart failure type [facility])  Reviewed 2021 echo showing diastolic dysfunction  --  Abnormal laboratory test  Recheck today    Aortic valve stenosis, etiology of cardiac valve disease unspecified  Discussed his recent visit with cardiology who plans to monitor this every 6 months.     Osteoporosis, unspecified osteoporosis type, unspecified pathological fracture presence  Tolerating alendronate       Chronic nonintractable headache, unspecified headache type  Doing much better with gabapentin. Asks if I will refill it in the future and I agreed.     Anxiety disorder-Sees Anusha Issa. Stable on Prozac, hydroxyzine, melatonin, quetiapine, trazodone.   States his social anxiety is much better with that hydroxyzine. (also for bipolar)   ---  Tobacco abuse  Ct Lungs due .  scheduled [facility]     Coronary artery calcification seen on CAT scan  On statin. Has not been on aspirin.       Atrial fibrillation, unspecified type [facility])  Now on Eliquis, managed by Cardiology       Essential tremor  Stable on Primidone      Return in about 4 months (around 2022) for awv. SUBJECTIVE/OBJECTIVE:  HPI   Patient is here for routine follow-up visit. He continues to be troubled by forgetfulness. He asks if I can review his neurology evaluation with him as he did not want to return to the neurologist.  I told him that I did not have a copy of the MRI they ordered will review their office note. I told him I would try to obtain it. He was in the emergency room a few weeks ago for constipation and urinary retention. He states constipation resolved with docusate.   He states urinary retention was evaluated by a urologist and has resolved. He did have a few abnormal labs which I want to recheck. He is trying to limit the number of specialist he sees due to changes in his Medicare plan. It is noted that he has gained weight. He says due to increased snacking. Past Medical History:   Diagnosis Date    Allergic rhintis 1959    Seasonal Hay Fever    Anxiety     Aortic stenosis     echo shows mod stenosis    Asthma     mixed w copd    Atrial fibrillation (HCC)     takes warfarin due to cost, cardioversion 8/4/11, 7/1/11, 2/16/11, 9/23/10    Atrial flutter ([address]     status post ablation-jack hare    Azoospermia     dx'd in teens    Bipolar 1 disorder ([address]     Chronic insomnia     Colon polyps     on colonoscopy    COPD (chronic obstructive pulmonary disease) ([address]     Coronary artery calcification seen on CAT scan     Depression     [facility]    Essential tremor     dr Blanco Villeda Essential tremor     GERD (gastroesophageal reflux disease)     weaned off lansoprazole    Hiatal hernia     small, ugi-esophageal dysmotility    Hidradenitis suppurativa     perirectal    history of alcoholism     sober since 2008    Hyperlipidemia     Hypertension     Insomnia     Microcytic anemia 10/14/2010    iron infusions [facility], now resolved, due to malabsorption-dr mix    Obstructive sleep apnea     on bipap 17/11resolved 3 years ago    Osteoporosis 09/2021    dexa    Prostate nodule     dr Virgilio Ramírez, urology    Seizures ([facility]     1973-One occurance. Unknown etiology. On Dilantin 1 year.     Tobacco abuse     annual chest ct in may    Tremor     primidone as per  neurology       Current Outpatient Medications   Medication Sig Dispense Refill    apixaban (ELIQUIS) 5 MG TABS tablet Take 5 mg by mouth 2 times daily      docusate sodium (COLACE) 100 MG capsule Take 1 capsule by mouth 2 times daily as needed for Constipation      alendronate (FOSAMAX) 70 MG tablet TAKE 1 TABLET BY MOUTH  EVERY 7 DAYS 12 tablet 3    gabapentin (NEURONTIN) 300 MG capsule Take 1 capsule by mouth 3 times daily for 90 days. 270 capsule 0    albuterol sulfate  (90 Base) MCG/ACT inhaler USE 2 INHALATIONS BY MOUTH  EVERY 6 HOURS AS NEEDED FOR WHEEZING 34 g 3    vitamin B-12 (CYANOCOBALAMIN) 1000 MCG tablet Take 1,000 mcg by mouth daily      atorvastatin (LIPITOR) 40 MG tablet TAKE 1 TABLET BY MOUTH  DAILY 90 tablet 3    hydrOXYzine (VISTARIL) 50 MG capsule Take 1 capsule by mouth 4 times daily as needed for Anxiety 120 capsule 0    traZODone (DESYREL) 150 MG tablet Take 150 mg by mouth every evening      WIXELA INHUB 250-50 MCG/DOSE AEPB Inhale 1 puff into the lungs every 12 hours 60 each 3    FLUoxetine (PROZAC) 20 MG capsule Take 3 capsules by mouth daily 45 capsule 0    Cholecalciferol (VITAMIN D3) 50 MCG (2000 UT) CAPS Take 4,000 Units by mouth daily       primidone (MYSOLINE) 250 MG tablet Take 750 mg by mouth nightly       QUEtiapine (SEROQUEL) 400 MG tablet Take 800 mg by mouth nightly      zinc 50 MG CAPS Take by mouth daily       Melatonin 10 MG TABS Take 20 mg by mouth nightly      montelukast (SINGULAIR) 10 MG tablet TAKE 1 TABLET BY MOUTH AT  NIGHT 90 tablet 3     No current facility-administered medications for this visit. Physical Exam  Vitals reviewed. Constitutional:       General: He is not in acute distress. HENT:      Head: Normocephalic and atraumatic. Cardiovascular:      Rate and Rhythm: Normal rate and regular rhythm. Pulmonary:      Effort: Pulmonary effort is normal.      Breath sounds: Normal breath sounds. Abdominal:      General: Abdomen is flat. Bowel sounds are normal.   Neurological:      Mental Status: He is alert. Mental status is at baseline.            On this date 1/20/2022 I have spent 30 minutes reviewing previous notes, test results and face to face with the patient discussing the diagnosis and importance of compliance with the treatment plan as well as documenting on the day of the visit. This note was generated completely or in part utilizing Dragon dictation speech recognition software. Occasionally, words are mistranscribed and despite editing, the text may contain inaccuracies due to incorrect word recognition. If further clarification is needed please contact the office at [phone]          An electronic signature was used to authenticate this note.     --Kameron Navas MD

## 2022-01-20 ENCOUNTER — PATIENT MESSAGE (OUTPATIENT)
Dept: INTERNAL MEDICINE CLINIC | Age: 77
End: 2022-01-20

## 2022-01-20 ENCOUNTER — OFFICE VISIT (OUTPATIENT)
Dept: INTERNAL MEDICINE CLINIC | Age: 77
End: 2022-01-20
Payer: MEDICARE

## 2022-01-20 VITALS
BODY MASS INDEX: 31.28 KG/M2 | RESPIRATION RATE: 14 BRPM | HEIGHT: 68 IN | WEIGHT: 206.4 LBS | HEART RATE: 67 BPM | DIASTOLIC BLOOD PRESSURE: 70 MMHG | OXYGEN SATURATION: 96 % | SYSTOLIC BLOOD PRESSURE: 112 MMHG

## 2022-01-20 DIAGNOSIS — I50.9 CONGESTIVE HEART FAILURE, UNSPECIFIED HF CHRONICITY, UNSPECIFIED HEART FAILURE TYPE (HCC): ICD-10-CM

## 2022-01-20 DIAGNOSIS — J44.9 CHRONIC OBSTRUCTIVE PULMONARY DISEASE, UNSPECIFIED COPD TYPE (HCC): ICD-10-CM

## 2022-01-20 PROCEDURE — 99214 OFFICE O/P EST MOD 30 MIN: CPT | Performed by: INTERNAL MEDICINE

## 2022-01-20 PROCEDURE — G8484 FLU IMMUNIZE NO ADMIN: HCPCS | Performed by: INTERNAL MEDICINE

## 2022-01-20 PROCEDURE — G8427 DOCREV CUR MEDS BY ELIG CLIN: HCPCS | Performed by: INTERNAL MEDICINE

## 2022-01-20 PROCEDURE — 3023F SPIROM DOC REV: CPT | Performed by: INTERNAL MEDICINE

## 2022-01-20 PROCEDURE — 1123F ACP DISCUSS/DSCN MKR DOCD: CPT | Performed by: INTERNAL MEDICINE

## 2022-01-20 PROCEDURE — 4004F PT TOBACCO SCREEN RCVD TLK: CPT | Performed by: INTERNAL MEDICINE

## 2022-01-20 PROCEDURE — G8417 CALC BMI ABV UP PARAM F/U: HCPCS | Performed by: INTERNAL MEDICINE

## 2022-01-20 PROCEDURE — 4040F PNEUMOC VAC/ADMIN/RCVD: CPT | Performed by: INTERNAL MEDICINE

## 2022-01-20 RX ORDER — MONTELUKAST SODIUM 10 MG/1
TABLET ORAL
Qty: 90 TABLET | Refills: 3 | Status: SHIPPED | OUTPATIENT
Start: 2022-01-20 | End: 2022-04-11 | Stop reason: SDUPTHER

## 2022-01-20 RX ORDER — DOCUSATE SODIUM 100 MG/1
100 CAPSULE, LIQUID FILLED ORAL 2 TIMES DAILY PRN
COMMUNITY
Start: 2022-01-20 | End: 2022-02-25

## 2022-01-20 NOTE — TELEPHONE ENCOUNTER
Last appointment: 1/20/2022  Next appointment: 5/9/2022  Last refill: 11/8/21 - Year supply requested

## 2022-01-28 RX ORDER — GABAPENTIN 300 MG/1
CAPSULE ORAL
Qty: 270 CAPSULE | Refills: 0 | Status: SHIPPED | OUTPATIENT
Start: 2022-01-28 | End: 2022-01-31 | Stop reason: SDUPTHER

## 2022-02-01 ENCOUNTER — TELEPHONE (OUTPATIENT)
Dept: INTERNAL MEDICINE CLINIC | Age: 77
End: 2022-02-01

## 2022-02-01 ENCOUNTER — PATIENT MESSAGE (OUTPATIENT)
Dept: INTERNAL MEDICINE CLINIC | Age: 77
End: 2022-02-01

## 2022-02-01 NOTE — TELEPHONE ENCOUNTER
FYI:    [facility] Update per patient. My insurance has changed: For 30-day scripts use [employer] [id number] at [address] My [address] [phone]    For 90-day scripts use [employer] on your electronic ordering process.      I will send a separate request for a refill  Euel Cooler

## 2022-02-01 NOTE — TELEPHONE ENCOUNTER
From: Ursula Flanagan  To: Dr. Camacho Coleman: 2/1/2022 1:18 PM EST  Subject: Gabapentin     I made a mistake in thinking I was close to running out of Gabapentin. However after you placed the order I found another full bottle so I called [facility] and cancelled the order. Im sorry for the misunderstanding and inconvenience to you.

## 2022-02-24 ENCOUNTER — TELEPHONE (OUTPATIENT)
Dept: INTERNAL MEDICINE CLINIC | Age: 77
End: 2022-02-24

## 2022-02-24 NOTE — TELEPHONE ENCOUNTER
Please ask him to make an appointment to discuss these 2 medications. They were weaned based on his request by mali but I think we need to further discuss in person. I can see him tomorrow at 130.

## 2022-02-24 NOTE — TELEPHONE ENCOUNTER
Called pt to let him know about his inhaler being refilled. Pt mentioned now that he was taking off his primidone and gabapentin. Pt states he is okay being taking off the gabapentin but now he is wondering if he should be put back on the primidone but small dose. Pt states he was on it for so long and even after being titrated off he feels like he is having withdraws from being off of it and his shaking is becoming to much.

## 2022-02-25 ENCOUNTER — OFFICE VISIT (OUTPATIENT)
Dept: INTERNAL MEDICINE CLINIC | Age: 77
End: 2022-02-25
Payer: MEDICARE

## 2022-02-25 VITALS
WEIGHT: 195 LBS | OXYGEN SATURATION: 97 % | SYSTOLIC BLOOD PRESSURE: 138 MMHG | HEART RATE: 61 BPM | DIASTOLIC BLOOD PRESSURE: 60 MMHG | BODY MASS INDEX: 29.65 KG/M2

## 2022-02-25 DIAGNOSIS — G25.0 ESSENTIAL TREMOR: Primary | ICD-10-CM

## 2022-02-25 PROCEDURE — 99213 OFFICE O/P EST LOW 20 MIN: CPT | Performed by: INTERNAL MEDICINE

## 2022-02-25 RX ORDER — PRIMIDONE 50 MG/1
50 TABLET ORAL 3 TIMES DAILY
Qty: 90 TABLET | Refills: 0 | Status: SHIPPED | OUTPATIENT
Start: 2022-02-25 | End: 2022-05-09

## 2022-02-25 SDOH — ECONOMIC STABILITY: FOOD INSECURITY: WITHIN THE PAST 12 MONTHS, THE FOOD YOU BOUGHT JUST DIDN'T LAST AND YOU DIDN'T HAVE MONEY TO GET MORE.: NEVER TRUE

## 2022-02-25 SDOH — ECONOMIC STABILITY: FOOD INSECURITY: WITHIN THE PAST 12 MONTHS, YOU WORRIED THAT YOUR FOOD WOULD RUN OUT BEFORE YOU GOT MONEY TO BUY MORE.: NEVER TRUE

## 2022-02-25 ASSESSMENT — SOCIAL DETERMINANTS OF HEALTH (SDOH): HOW HARD IS IT FOR YOU TO PAY FOR THE VERY BASICS LIKE FOOD, HOUSING, MEDICAL CARE, AND HEATING?: SOMEWHAT HARD

## 2022-02-25 NOTE — PROGRESS NOTES
Laura Galicia (:  1945) is a 67 y.o. male, here for evaluation of the following chief complaint(s):    Follow-up (Medication follow up)      ASSESSMENT/PLAN:  1. Essential tremor  Patient developed significant symptoms of essential tremor off of the primidone so I advised him to restart. He can take 50 mg before bed for 3 days, then increase to 100 mg before bed for 3 days, then increase to 150 mg before bed. He was previously on a much higher dose of 750 mg before bed for a long time. Told him to let me know if his symptoms are significantly resolved on the lower dose. -     primidone (MYSOLINE) 50 MG tablet; Take 1 tablet by mouth 3 times daily As directed, Disp-90 tablet, R-0Normal    Pointed out to him that there is a slight drug interaction with his Eliquis. He continues with polypharmacy related to his Seroquel, Prozac, trazodone managed by his psychiatrist.  Shelia Rothman 22 appt    SUBJECTIVE/OBJECTIVE:  HPI    Patient is here to follow-up recent drug holiday. Wanted to try himself off of gabapentin and primidone to see if they were still needed. He states getting off gabapentin did not have any effect on his headaches one way or the other. He states getting off primidone has led to significant shaking of his head and left hand, similar to his previous essential tremor symptoms. He has had some weight loss off the gabapentin. He has had some cloudy thinking since off primidone he says. Review of Systems   Neurological: Positive for tremors and headaches.        Past Medical History:   Diagnosis Date    Allergic rhintis     Seasonal Hay Fever    Anxiety     Aortic stenosis     echo shows mod stenosis    Asthma     mixed w copd    Atrial fibrillation ([facility]     takes warfarin due to cost, cardioversion 11, 11, 11, 9/23/10    Atrial flutter (HCC)     status post ablation-[facility] hare    Azoospermia     dx'd in teens    Bipolar 1 disorder ([facility]     Chronic insomnia  Colon polyps     on colonoscopy    COPD (chronic obstructive pulmonary disease) (HCC)     Coronary artery calcification seen on CAT scan     Depression     [facility]    Essential tremor     dr Gerhard Graves   [facility] GERD (gastroesophageal reflux disease)     weaned off lansoprazole    Hiatal hernia     small, ugi-esophageal dysmotility    Hidradenitis suppurativa     perirectal    history of alcoholism     sober since 2008    Hyperlipidemia     Hypertension     Insomnia     Microcytic anemia 10/14/2010    iron infusions [facility], now resolved, due to malabsorption-dr mix    Obstructive sleep apnea     on bipap 17/11resolved 3 years ago    Osteoporosis 09/2021    dexa    Prostate nodule     dr Kait Parra, urology    Seizures [facility])     1973-One occurance. Unknown etiology. On Dilantin 1 year.  Tobacco abuse     annual chest ct in may    Tremor     primidone as per  neurology       Current Outpatient Medications   Medication Sig Dispense Refill    apixaban (ELIQUIS) 5 MG TABS tablet Take 5 mg by mouth 2 times daily      primidone (MYSOLINE) 50 MG tablet Take 1 tablet by mouth 3 times daily As directed 90 tablet 0    fluticasone-salmeterol (ADVAIR) 250-50 MCG/DOSE AEPB Inhale 1 puff into the lungs every 12 hours Rinse mouth after use.  60 each 5    montelukast (SINGULAIR) 10 MG tablet TAKE 1 TABLET BY MOUTH AT  NIGHT 90 tablet 3    alendronate (FOSAMAX) 70 MG tablet TAKE 1 TABLET BY MOUTH  EVERY 7 DAYS 12 tablet 3    albuterol sulfate  (90 Base) MCG/ACT inhaler USE 2 INHALATIONS BY MOUTH  EVERY 6 HOURS AS NEEDED FOR WHEEZING 34 g 3    vitamin B-12 (CYANOCOBALAMIN) 1000 MCG tablet Take 1,000 mcg by mouth daily      atorvastatin (LIPITOR) 40 MG tablet TAKE 1 TABLET BY MOUTH  DAILY 90 tablet 3    hydrOXYzine (VISTARIL) 50 MG capsule Take 1 capsule by mouth 4 times daily as needed for Anxiety 120 capsule 0    traZODone (DESYREL) 150 MG tablet Take 150 mg by mouth every evening      FLUoxetine (PROZAC) 20 MG capsule Take 3 capsules by mouth daily 45 capsule 0    QUEtiapine (SEROQUEL) 400 MG tablet Take 800 mg by mouth nightly      zinc 50 MG CAPS Take by mouth daily       Melatonin 10 MG TABS Take 20 mg by mouth nightly      Cholecalciferol (VITAMIN D3) 50 MCG (2000 UT) CAPS Take 4,000 Units by mouth daily        No current facility-administered medications for this visit. Physical Exam  Vitals reviewed. Constitutional:       General: He is not in acute distress. Neurological:      Mental Status: He is alert and oriented to person, place, and time. Comments: \"neck\" and left hand tremor noted   Psychiatric:         Mood and Affect: Mood normal.                This note was generated completely or in part utilizing Dragon dictation speech recognition software. Occasionally, words are mistranscribed and despite editing, the text may contain inaccuracies due to incorrect word recognition. If further clarification is needed please contact the office at [phone]          An electronic signature was used to authenticate this note.     --Fara Gomez MD

## 2022-02-25 NOTE — PATIENT INSTRUCTIONS
-Take primidone 50 mg before bed.  -If tolerating and essential tremor persists after 3 days, then can increase to 100 mg before bed  -If tolerating and essential tremor persists after 3 more days, then can increase to 150 mg before bed    Call with results.

## 2022-03-11 ENCOUNTER — TELEPHONE (OUTPATIENT)
Dept: INTERNAL MEDICINE CLINIC | Age: 77
End: 2022-03-11

## 2022-03-11 NOTE — TELEPHONE ENCOUNTER
I have faxed over the most recent office notes so I am not sure what else they are needing.  I have faxed them again

## 2022-03-11 NOTE — TELEPHONE ENCOUNTER
Danielle,  Can you help us with this. This patients Radha Lyles is not covered and we tried to do a a PA but they did not cover it and needed more information but never sent us the request. We need help!     Thank you

## 2022-03-11 NOTE — TELEPHONE ENCOUNTER
The last OV note does not address his COPD or Wixela. They likely have other in network combination inhalers . If DR Valentín Ram feels Kade Decent is best choice due to others not being as effective or. . then she could document that on the forms they sent. Khai Ann otherwise he will have to go with the in network Breo Juan , Taliaferro , Advair or Symbicort. Khai Ann

## 2022-03-11 NOTE — TELEPHONE ENCOUNTER
I don't see the forms ( referenced to on 3/7 in telephone encounter) that were sent in the media section of chart. .I would look over those and make sure all information and supporting documentation was faxed back to them

## 2022-03-17 ENCOUNTER — PATIENT MESSAGE (OUTPATIENT)
Dept: INTERNAL MEDICINE CLINIC | Age: 77
End: 2022-03-17

## 2022-03-17 NOTE — TELEPHONE ENCOUNTER
From: Gabino Flanagan  To: Dr. Rosario [place]: 3/17/2022 12:10 PM EDT  Subject: Cream    Can you prescribe a cream for me due to (pardon the words) crotch or jock itch? I have had it for a couple of weeks and the OTC cream I used does not do much. If I would please send a prescription to [facility] on [facility] [facility] in [facility] [facility]. Thank you.

## 2022-03-20 DIAGNOSIS — G25.0 ESSENTIAL TREMOR: ICD-10-CM

## 2022-03-21 RX ORDER — PRIMIDONE 50 MG/1
50 TABLET ORAL 3 TIMES DAILY
Qty: 90 TABLET | Refills: 0 | OUTPATIENT
Start: 2022-03-21

## 2022-03-21 NOTE — TELEPHONE ENCOUNTER
Please call patient to clarify the current dose of primidone he is taking so I can refill his primidone prescription with the correct instructions.

## 2022-04-25 SDOH — HEALTH STABILITY: PHYSICAL HEALTH: ON AVERAGE, HOW MANY MINUTES DO YOU ENGAGE IN EXERCISE AT THIS LEVEL?: 30 MIN

## 2022-04-25 SDOH — HEALTH STABILITY: PHYSICAL HEALTH: ON AVERAGE, HOW MANY DAYS PER WEEK DO YOU ENGAGE IN MODERATE TO STRENUOUS EXERCISE (LIKE A BRISK WALK)?: 1 DAY

## 2022-04-25 ASSESSMENT — PATIENT HEALTH QUESTIONNAIRE - PHQ9
SUM OF ALL RESPONSES TO PHQ QUESTIONS 1-9: 0
2. FEELING DOWN, DEPRESSED OR HOPELESS: 0
SUM OF ALL RESPONSES TO PHQ QUESTIONS 1-9: 0
SUM OF ALL RESPONSES TO PHQ9 QUESTIONS 1 & 2: 0
1. LITTLE INTEREST OR PLEASURE IN DOING THINGS: 0

## 2022-04-25 ASSESSMENT — LIFESTYLE VARIABLES
HOW MANY STANDARD DRINKS CONTAINING ALCOHOL DO YOU HAVE ON A TYPICAL DAY: 98
HOW MANY STANDARD DRINKS CONTAINING ALCOHOL DO YOU HAVE ON A TYPICAL DAY: PATIENT DECLINED
HOW OFTEN DO YOU HAVE SIX OR MORE DRINKS ON ONE OCCASION: 1
HOW OFTEN DO YOU HAVE A DRINK CONTAINING ALCOHOL: NEVER
HOW OFTEN DO YOU HAVE A DRINK CONTAINING ALCOHOL: 1

## 2022-05-09 ENCOUNTER — OFFICE VISIT (OUTPATIENT)
Dept: INTERNAL MEDICINE CLINIC | Age: 77
End: 2022-05-09
Payer: MEDICARE

## 2022-05-09 VITALS
HEART RATE: 80 BPM | DIASTOLIC BLOOD PRESSURE: 58 MMHG | WEIGHT: 181.2 LBS | BODY MASS INDEX: 27.46 KG/M2 | HEIGHT: 68 IN | OXYGEN SATURATION: 97 % | SYSTOLIC BLOOD PRESSURE: 92 MMHG

## 2022-05-09 DIAGNOSIS — I25.10 CORONARY ARTERY CALCIFICATION SEEN ON CAT SCAN: ICD-10-CM

## 2022-05-09 DIAGNOSIS — R63.4 WEIGHT LOSS: ICD-10-CM

## 2022-05-09 DIAGNOSIS — D72.819 LEUKOPENIA, UNSPECIFIED TYPE: ICD-10-CM

## 2022-05-09 DIAGNOSIS — R26.89 BALANCE DISORDER: ICD-10-CM

## 2022-05-09 DIAGNOSIS — Z00.00 MEDICARE ANNUAL WELLNESS VISIT, SUBSEQUENT: Primary | ICD-10-CM

## 2022-05-09 DIAGNOSIS — G25.0 ESSENTIAL TREMOR: ICD-10-CM

## 2022-05-09 DIAGNOSIS — R74.8 ELEVATED ALKALINE PHOSPHATASE LEVEL: ICD-10-CM

## 2022-05-09 PROCEDURE — 99213 OFFICE O/P EST LOW 20 MIN: CPT | Performed by: INTERNAL MEDICINE

## 2022-05-09 PROCEDURE — G0439 PPPS, SUBSEQ VISIT: HCPCS | Performed by: INTERNAL MEDICINE

## 2022-05-09 NOTE — PATIENT INSTRUCTIONS
Labs today    Living Will    Cut back on smoking and will likely have better appetite    Physical Therapy    Increase fluids (water and Gatorade) and use Ensure    ----  Personalized Preventive Plan for Jaquelin Flanagan - 5/9/2022  Medicare offers a range of preventive health benefits. Some of the tests and screenings are paid in full while other may be subject to a deductible, co-insurance, and/or copay. Some of these benefits include a comprehensive review of your medical history including lifestyle, illnesses that may run in your family, and various assessments and screenings as appropriate. After reviewing your medical record and screening and assessments performed today your provider may have ordered immunizations, labs, imaging, and/or referrals for you. A list of these orders (if applicable) as well as your Preventive Care list are included within your After Visit Summary for your review. Other Preventive Recommendations:    · A preventive eye exam performed by an eye specialist is recommended every 1-2 years to screen for glaucoma; cataracts, macular degeneration, and other eye disorders. · A preventive dental visit is recommended every 6 months. · Try to get at least 150 minutes of exercise per week or 10,000 steps per day on a pedometer . · Order or download the FREE \"Exercise & Physical Activity: Your Everyday Guide\" from [employer]. Call 3-793.399.4153 or search [employer] online. · You need 4241-7203 mg of calcium and 6420-9168 IU of vitamin D per day. It is possible to meet your calcium requirement with diet alone, but a vitamin D supplement is usually necessary to meet this goal.  · When exposed to the sun, use a sunscreen that protects against both UVA and UVB radiation with an SPF of 30 or greater. Reapply every 2 to 3 hours or after sweating, drying off with a towel, or swimming. · Always wear a seat belt when traveling in a car.  Always wear a helmet when riding a bicycle or motorcycle.

## 2022-05-09 NOTE — PROGRESS NOTES
Medicare Annual Wellness Visit    Delmy Matute is here for Medicare AWV    [address] was seen today for medicare awv. Diagnoses and all orders for this visit:    Medicare annual wellness visit, subsequent  -Living Will discussed    Balance disorder  -     [facility] [address] increased fluids. Weight loss  -     Sedimentation Rate; Future  -     TSH; Future  Advised reducing cigarette smoking and using Ensure    Coronary artery calcification seen on CAT scan  On statin. aspirin? ?  -     LIPID PANEL; Future    Essential tremor  Stable on primidone  -     Comprehensive Metabolic Panel; Future    Leukopenia, unspecified type  -     CBC; Future    --  Chronic obstructive pulmonary disease, unspecified COPD type ([address]  Stable on Albuterol, Singulair, Wixela     Congestive heart failure, unspecified HF chronicity, unspecified heart failure type [facility])  Reviewed September 2021 echo showing diastolic dysfunction. Advised increasing his fluids as his blood pressure is now on the low side.     Aortic valve stenosis, etiology of cardiac valve disease unspecified  Discussed his recent visit with cardiology who plans to monitor this every 6 months.     Osteoporosis, unspecified osteoporosis type, unspecified pathological fracture presence  Tolerating alendronate     Chronic nonintractable headache, unspecified headache type  Doing ok off gabapentin     Anxiety disorder-Sees Anusha Lujan.  Stable on Prozac, hydroxyzine, melatonin, quetiapine, trazodone.   States his social anxiety is much better with that hydroxyzine. (also for bipolar)     Tobacco abuse  Ct Lungs due 5/22.  scheduled at [facility]      Atrial fibrillation, unspecified type ([address]  Now on Eliquis, managed by Cardiology     Essential tremor  Stable on Primidone  ---       Recommendations for Preventive Services Due: see orders and patient instructions/AVS.  Recommended screening schedule for the next 5-10 years is provided to the patient in written form: see Patient Instructions/AVS.     Return in 3 months (on 8/9/2022) for Medicare Annual Wellness Visit in 1 year. Subjective      Patient reports 2 recent falls that caused abrasions of both his elbows and bruise of left lower anterior ribs. The falls tend to happen when he is bending over to pick something up. His blood pressure is noted to be low today, he is not on blood pressure medication. He does have a history of aortic stenosis. His appetite is reduced and he has lost 25 pounds over the past 6 months or so. Smoking is up to 3 packs/day and he tends to use smoking to try to reduce his appetite. He has a history of bariatric surgery and is concerned about weight gain. Upcoming chest CT and follow-up with urology for some abnormality seen on CAT scan last year. Patient's complete Health Risk Assessment and screening values have been reviewed and are found in Flowsheets. The following problems were reviewed today and where indicated follow up appointments were made and/or referrals ordered.     Positive Risk Factor Screenings with Interventions:    Fall Risk:  Do you feel unsteady or are you worried about falling? : (!) yes  2 or more falls in past year?: (!) yes  Fall with injury in past year?: no     Fall Risk Interventions:    · Physical therapy referral ordered for strength and balance training        Tobacco Use:     Tobacco Use: High Risk    Smoking Tobacco Use: Current Every Day Smoker    Smokeless Tobacco Use: Never Used     E-Cigarettes/Vaping Use     Questions Responses    E-Cigarette/Vaping Use Never User    Start Date     Passive Exposure     Quit Date     Counseling Given     Comments         Substance Use - Tobacco Interventions:  patient is not ready to work toward tobacco cessation at this time       smoking 3 packs per day, so discussed that this is likely reducing his appetite    General Health and ACP:  General  In general, how would you say your health is?: Fair  In the past 7 days, have you experienced any of the following: New or Increased Pain, New or Increased Fatigue, Loneliness, Social Isolation, Stress or Anger?: No  Do you get the social and emotional support that you need?: Yes  Do you have a Living Will?: No    Advance Directives     Power of [address] Will ACP-Advance Directive ACP-Power of     Not on File Not on File Not on File Not on File      General Health Risk Interventions:  · No Living Will: Advance Care Planning addressed with patient today    Health Habits/Nutrition:     Physical Activity: Insufficiently Active    Days of Exercise per Week: 1 day    Minutes of Exercise per Session: 30 min     Have you lost any weight without trying in the past 3 months?: (!) Yes  Body mass index: (!) 27.55  Have you seen the dentist within the past year?: (!) No    Health Habits/Nutrition Interventions:  · Nutritional issues:  discussed ensure, reduce smoking      ADLs:  In the past 7 days, did you need help from others to perform any of the following everyday activities: Eating, dressing, grooming, bathing, toileting, or walking/balance?: No  In the past 7 days, did you need help from others to take care of any of the following: Laundry, housekeeping, banking/finances, shopping, telephone use, food preparation, transportation, or taking medications?: (!) Yes  Select all that apply: (!) Housekeeping,Shopping,Transportation    ADL Interventions:  · gets help from [facility]          Objective   Vitals:    05/09/22 1031   BP: (!) 92/58   Site: Right Upper Arm   Position: Sitting   Cuff Size: Medium Adult   Pulse: 80   SpO2: 97%   Weight: 181 lb 3.2 oz (82.2 kg)   Height: 5' 8\" (1.727 m)      Body mass index is 27.55 kg/m². Patient is in no acute distress. He smells of cigarette smoke. Heart is regular rate and rhythm with murmur. Lung sounds are reduced. Abdomen soft, nontender. Calves are without edema. Abrasions are noted at both elbows without surrounding erythema. Patient is alert and oriented x3. No Known Allergies  Prior to Visit Medications    Medication Sig Taking?  Authorizing Provider   fluticasone-salmeterol (ADVAIR DISKUS) 250-50 MCG/ACT AEPB diskus inhaler Inhale 1 puff into the lungs every 12 hours Yes Lashell Burroughs MD   montelukast (SINGULAIR) 10 MG tablet TAKE 1 TABLET BY MOUTH AT  NIGHT Yes Tyler Albarran MD   primidone (MYSOLINE) 250 MG tablet Take 1 tablet by mouth 3 times daily Yes Tyler Albarran MD   apixaban (ELIQUIS) 5 MG TABS tablet Take 5 mg by mouth 2 times daily Yes Historical Provider, MD   alendronate (FOSAMAX) 70 MG tablet TAKE 1 TABLET BY MOUTH  EVERY 7 DAYS Yes Lashell Winters MD   albuterol sulfate  (90 Base) MCG/ACT inhaler USE 2 INHALATIONS BY MOUTH  EVERY 6 HOURS AS NEEDED FOR WHEEZING Yes Lashell Burroughs MD   vitamin B-12 (CYANOCOBALAMIN) 1000 MCG tablet Take 1,000 mcg by mouth daily Yes Historical Provider, MD   atorvastatin (LIPITOR) 40 MG tablet TAKE 1 TABLET BY MOUTH  DAILY Yes Tyler Albarran MD   hydrOXYzine (VISTARIL) 50 MG capsule Take 1 capsule by mouth 4 times daily as needed for Anxiety Yes Tyler Albarran MD   traZODone (DESYREL) 150 MG tablet Take 150 mg by mouth every evening Yes Historical Provider, MD   FLUoxetine (PROZAC) 20 MG capsule Take 3 capsules by mouth daily Yes Tyler Albarran MD   Cholecalciferol (VITAMIN D3) 50 MCG (2000 UT) CAPS Take 4,000 Units by mouth daily  Yes Historical Provider, MD   QUEtiapine (SEROQUEL) 400 MG tablet Take 800 mg by mouth nightly Yes Historical Provider, MD   zinc 50 MG CAPS Take by mouth daily  Yes Historical Provider, MD   Melatonin 10 MG TABS Take 20 mg by mouth nightly Yes Historical Provider, MD   butenafine 1 % CREA Apply topically two times per day  Tyler Albarran MD       CareTeam (Including outside providers/suppliers regularly involved in providing care):   Patient Care Team:  Aylin Tran MD as PCP - General (Internal Medicine)  Aylin Tran MD as PCP - [facility] Provider  Linh Monreal MD as Consulting Physician (Gastroenterology)  Ruthanne Kussmaul, MD as Consulting Physician (Cardiology)  Whit Swift MD as Consulting Physician (Pulmonology)  Meli Chery MD as Consulting Physician (Cardiology)  Torres Douglas MD as Consulting Physician (Pulmonology)  Alison Crowe MD as Consulting Physician (Urology)  Alvarez Knight MD as Surgeon (General Surgery)  CHRISTOPHER Pena CNP as Nurse Practitioner (Nurse Practitioner)  Meli Chery MD as Surgeon (Urology)  Katiuska Tillman MD as Consulting Physician (Hematology and Oncology)  Talib Seo MD as Consulting Physician (Urology)    Reviewed and updated this visit:  Tobacco  Allergies  Meds  Problems  Med Hx  Surg Hx  Soc Hx  Fam Hx

## 2022-05-10 DIAGNOSIS — R74.8 ELEVATED ALKALINE PHOSPHATASE LEVEL: ICD-10-CM

## 2022-05-13 ENCOUNTER — APPOINTMENT (OUTPATIENT)
Dept: CT IMAGING | Age: 77
End: 2022-05-13
Payer: MEDICARE

## 2022-05-13 ENCOUNTER — HOSPITAL ENCOUNTER (EMERGENCY)
Age: 77
Discharge: HOME | End: 2022-05-13
Attending: EMERGENCY MEDICINE
Payer: MEDICARE

## 2022-05-13 ENCOUNTER — APPOINTMENT (OUTPATIENT)
Dept: GENERAL RADIOLOGY | Age: 77
End: 2022-05-13
Payer: MEDICARE

## 2022-05-13 VITALS
HEART RATE: 64 BPM | SYSTOLIC BLOOD PRESSURE: 127 MMHG | RESPIRATION RATE: 18 BRPM | TEMPERATURE: 98 F | OXYGEN SATURATION: 96 % | DIASTOLIC BLOOD PRESSURE: 65 MMHG
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
## 2022-05-13 LAB
A/G RATIO: 0.8 (ref 1.1–2.2)
ALBUMIN SERPL-MCNC: 2.5 G/DL (ref 3.4–5)
ALK PHOS OTHER CALC: 0 U/L
ALK PHOSPHATASE: 202 U/L (ref 40–120)
ALKALINE PHOSPHATASE BONE FRACTION: 57 U/L (ref 0–55)
ALKALINE PHOSPHATASE LIVER FRACTION: 145 U/L (ref 0–94)
ALP BLD-CCNC: 171 U/L (ref 40–129)
ALT SERPL-CCNC: 16 U/L (ref 10–40)
ANION GAP SERPL CALCULATED.3IONS-SCNC: 9 MMOL/L (ref 3–16)
APTT: 40.9 SEC (ref 26.2–38.6)
AST SERPL-CCNC: 29 U/L (ref 15–37)
BASE EXCESS VENOUS: 4.8 MMOL/L (ref -3–3)
BASOPHILS ABSOLUTE: 0 K/UL (ref 0–0.2)
BASOPHILS RELATIVE PERCENT: 0.1 %
BILIRUB SERPL-MCNC: 0.5 MG/DL (ref 0–1)
BUN BLDV-MCNC: 10 MG/DL (ref 7–20)
CALCIUM SERPL-MCNC: 7.9 MG/DL (ref 8.3–10.6)
CARBOXYHEMOGLOBIN: 5.3 % (ref 0–1.5)
CHLORIDE BLD-SCNC: 101 MMOL/L (ref 99–110)
CO2: 24 MMOL/L (ref 21–32)
CREAT SERPL-MCNC: <0.5 MG/DL (ref 0.8–1.3)
EKG ATRIAL RATE: 66 BPM
EKG DIAGNOSIS: NORMAL
EKG P AXIS: 35 DEGREES
EKG P-R INTERVAL: 170 MS
EKG Q-T INTERVAL: 476 MS
EKG QRS DURATION: 142 MS
EKG QTC CALCULATION (BAZETT): 499 MS
EKG R AXIS: -58 DEGREES
EKG T AXIS: 20 DEGREES
EKG VENTRICULAR RATE: 66 BPM
EOSINOPHILS ABSOLUTE: 0.1 K/UL (ref 0–0.6)
EOSINOPHILS RELATIVE PERCENT: 0.9 %
GFR AFRICAN AMERICAN: >60
GFR NON-AFRICAN AMERICAN: >60
GLUCOSE BLD-MCNC: 100 MG/DL (ref 70–99)
HCO3 VENOUS: 30 MMOL/L (ref 23–29)
HCT VFR BLD CALC: 40 % (ref 40.5–52.5)
HEMOGLOBIN, VEN, REDUCED: 5 %
HEMOGLOBIN: 13.5 G/DL (ref 13.5–17.5)
INR BLD: 1.08 (ref 0.88–1.12)
LACTIC ACID, SEPSIS: 0.8 MMOL/L (ref 0.4–1.9)
LYMPHOCYTES ABSOLUTE: 1 K/UL (ref 1–5.1)
LYMPHOCYTES RELATIVE PERCENT: 9 %
MCH RBC QN AUTO: 30.7 PG (ref 26–34)
MCHC RBC AUTO-ENTMCNC: 33.7 G/DL (ref 31–36)
MCV RBC AUTO: 91.1 FL (ref 80–100)
METHEMOGLOBIN VENOUS: 0 %
MONOCYTES ABSOLUTE: 0.9 K/UL (ref 0–1.3)
MONOCYTES RELATIVE PERCENT: 7.9 %
NEUTROPHILS ABSOLUTE: 9 K/UL (ref 1.7–7.7)
NEUTROPHILS RELATIVE PERCENT: 82.1 %
O2 CONTENT, VEN: 18 VOL %
O2 SAT, VEN: 95 %
O2 THERAPY: ABNORMAL
PCO2, VEN: 45.5 MMHG (ref 40–50)
PDW BLD-RTO: 17.3 % (ref 12.4–15.4)
PH VENOUS: 7.43 (ref 7.35–7.45)
PLATELET # BLD: 330 K/UL (ref 135–450)
PMV BLD AUTO: 8 FL (ref 5–10.5)
PO2, VEN: 69.7 MMHG (ref 25–40)
POTASSIUM REFLEX MAGNESIUM: 3.8 MMOL/L (ref 3.5–5.1)
PRO-BNP: 156 PG/ML (ref 0–449)
PROTHROMBIN TIME: 12.2 SEC (ref 9.9–12.7)
RBC # BLD: 4.39 M/UL (ref 4.2–5.9)
SODIUM BLD-SCNC: 134 MMOL/L (ref 136–145)
TCO2 CALC VENOUS: 70 MMOL/L
TOTAL CK: 66 U/L (ref 39–308)
TOTAL PROTEIN: 5.5 G/DL (ref 6.4–8.2)
TROPONIN: <0.01 NG/ML
WBC # BLD: 10.9 K/UL (ref 4–11)

## 2022-05-13 PROCEDURE — 84484 ASSAY OF TROPONIN QUANT: CPT

## 2022-05-13 PROCEDURE — 82550 ASSAY OF CK (CPK): CPT

## 2022-05-13 PROCEDURE — 2580000003 HC RX 258: Performed by: EMERGENCY MEDICINE

## 2022-05-13 PROCEDURE — 93005 ELECTROCARDIOGRAM TRACING: CPT | Performed by: EMERGENCY MEDICINE

## 2022-05-13 PROCEDURE — 80053 COMPREHEN METABOLIC PANEL: CPT

## 2022-05-13 PROCEDURE — 83880 ASSAY OF NATRIURETIC PEPTIDE: CPT

## 2022-05-13 PROCEDURE — 83605 ASSAY OF LACTIC ACID: CPT

## 2022-05-13 PROCEDURE — 85730 THROMBOPLASTIN TIME PARTIAL: CPT

## 2022-05-13 PROCEDURE — 99285 EMERGENCY DEPT VISIT HI MDM: CPT

## 2022-05-13 PROCEDURE — 82803 BLOOD GASES ANY COMBINATION: CPT

## 2022-05-13 PROCEDURE — 85025 COMPLETE CBC W/AUTO DIFF WBC: CPT

## 2022-05-13 PROCEDURE — 71045 X-RAY EXAM CHEST 1 VIEW: CPT

## 2022-05-13 PROCEDURE — 70450 CT HEAD/BRAIN W/O DYE: CPT

## 2022-05-13 PROCEDURE — 85610 PROTHROMBIN TIME: CPT

## 2022-05-13 PROCEDURE — 93010 ELECTROCARDIOGRAM REPORT: CPT | Performed by: INTERNAL MEDICINE

## 2022-05-13 RX ORDER — SODIUM CHLORIDE, SODIUM LACTATE, POTASSIUM CHLORIDE, CALCIUM CHLORIDE 600; 310; 30; 20 MG/100ML; MG/100ML; MG/100ML; MG/100ML
30 INJECTION, SOLUTION INTRAVENOUS ONCE
Status: COMPLETED | OUTPATIENT
Start: 2022-05-13 | End: 2022-05-13

## 2022-05-13 RX ADMIN — SODIUM CHLORIDE, POTASSIUM CHLORIDE, SODIUM LACTATE AND CALCIUM CHLORIDE 1000 ML: 600; 310; 30; 20 INJECTION, SOLUTION INTRAVENOUS at 08:34

## 2022-05-13 ASSESSMENT — PAIN SCALES - GENERAL: PAINLEVEL_OUTOF10: 0

## 2022-05-13 NOTE — ED NOTES
Report provided to [facility], Pt still remains in stable condition at this time, and all questions answered.      Venkat Cardoza RN  05/13/22 9757

## 2022-05-13 NOTE — ED PROVIDER NOTES
[facility]        Pt Name: Carol Donaldson  MRN: [id number]  [place] [birth year]  Date of evaluation: 5/13/2022  Provider: Yesenia Talavera MD  PCP: Noni Cunningham MD    This patient was seen and evaluated by the attending physician Yesenia Talavera MD.      Delio Gunnar       Chief Complaint   Patient presents with    Fall     Pt brought in by EMS from home found down on the floor, slipped in stool, fall at midnight, on eliquis, denies LOC, no numbness/tingling, A and O x4, had to drag himself across the floor to get life alert. HISTORY OF PRESENT ILLNESS   (Location/Symptom, Timing/Onset, Context/Setting, Quality, Duration, Modifying Factors, Severity)  Note limiting factors. Carol Donaldson is a 67 y.o. male here today with a chief complaint of fall at home. Apparently had to have bowel movement overnight got up was incontinent of stool and tripped and fell while trying to clean it up. Denies any complaints outside of having to drag himself across the floor and thus sustaining abrasions on the carpet. No nausea or diarrhea no reported head injury. No tingling numbness paresthesia. No fevers chills sweats. History of chronic A. fib and aortic stenosis on Eliquis. Nursing Notes were all reviewed and agreed with or any disagreements were addressed  in the HPI. REVIEW OF SYSTEMS    (2-9 systems for level 4, 10 or more for level 5)     Review of Systems    Positives and Pertinent negatives as per HPI. Except as noted abovein the ROS, all other systems were reviewed and negative.        PAST MEDICAL HISTORY     Past Medical History:   Diagnosis Date    Allergic rhintis 1959    Seasonal Hay Fever    Anxiety     Aortic stenosis     echo shows mod stenosis    Asthma     mixed w copd    Atrial fibrillation ([address]     takes warfarin due to cost, cardioversion 8/4/11, 7/1/11, 2/16/11, 9/23/10    Atrial flutter (HCC)     status post ablation-jack hare    Azoospermia     dx'd in teens    Bipolar 1 disorder ([facility]     Chronic insomnia     Colon polyps     on colonoscopy    COPD (chronic obstructive pulmonary disease) (HCC)     Coronary artery calcification seen on CAT scan     Depression     [facility]    Essential tremor     dr Wali Delvalle GERD (gastroesophageal reflux disease)     weaned off lansoprazole    Hiatal hernia     small, ugi-esophageal dysmotility    Hidradenitis suppurativa     perirectal    history of alcoholism     sober since 2008    Hyperlipidemia     Hypertension     Insomnia     Microcytic anemia 10/14/2010    iron infusions [facility], now resolved, due to malabsorption-dr mix    Obstructive sleep apnea     on bipap 17/11resolved 3 years ago    Osteoporosis 09/2021    dexa    Prostate nodule     dr Ron Woods, urology    Seizures ([facility]     1973-One occurance. Unknown etiology. On Dilantin 1 year.     Tobacco abuse     annual chest ct in may    Tremor     primidone as per  neurology         SURGICAL HISTORY     Past Surgical History:   Procedure Laterality Date    ABDOMEN SURGERY      gastric band    [place]    ATRIAL ABLATION SURGERY  08/05/2011    atrial flutter ablation    ATRIAL ABLATION SURGERY  09/01/2011    cryoablation for atrial fibrillation    ATRIAL ABLATION SURGERY  09/03/2015    RFCA for AF with PVI    BARIATRIC SURGERY  08/05/2013    band over bypass    CARDIOVERSION  x4    CATARACT REMOVAL Bilateral 2009    [address] [place] [address], LAPAROSCOPIC N/A 06/20/2016    LAPAROSCOPIC CHOLECYSTECTOMY WITH CHOLANGIOGRAM    COLONOSCOPY  10/01/2009    Keegan, repeat 3 years    COLONOSCOPY N/A 07/31/2020    COLONOSCOPY POLYPECTOMY SNARE/COLD BIOPSY performed by Ramiro Colon MD at [facility] COLONOSCOPY N/A 07/14/2021    COLONOSCOPY POLYPECTOMY SNARE/COLD BIOPSY performed by Ramiro Colon MD at [facility]    CYST REMOVAL      from chest    HEMORRHOID SURGERY  03/23/2016    HERNIA REPAIR      Repaired in conjunction with Band Over Bypass, 2013.     OTHER SURGICAL HISTORY  09/21/2015    excision vivian rectal cyst    PROSTATE BIOPSY  04/2021    neg    DIMITRIS-EN-Y GASTRIC BYPASS  2001    [facility]    UPPER GASTROINTESTINAL ENDOSCOPY  05/13/2013    [place]    UPPER GASTROINTESTINAL ENDOSCOPY N/A 07/31/2020    EGD BIOPSY performed by Leyda Luke MD at [facility] 10/02/2020    EGD DIAGNOSTIC ONLY performed by Leyda Luke MD at [address] [place] [address]  04/2021    UPPER GASTROINTESTINAL ENDOSCOPY  07/28/2021         CURRENTMEDICATIONS       Previous Medications    ALBUTEROL SULFATE  (90 BASE) MCG/ACT INHALER    USE 2 INHALATIONS BY MOUTH  EVERY 6 HOURS AS NEEDED FOR WHEEZING    ALENDRONATE (FOSAMAX) 70 MG TABLET    TAKE 1 TABLET BY MOUTH  EVERY 7 DAYS    APIXABAN (ELIQUIS) 5 MG TABS TABLET    Take 5 mg by mouth 2 times daily    ATORVASTATIN (LIPITOR) 40 MG TABLET    TAKE 1 TABLET BY MOUTH  DAILY    CHOLECALCIFEROL (VITAMIN D3) 50 MCG (2000 UT) CAPS    Take 4,000 Units by mouth daily     FLUOXETINE (PROZAC) 20 MG CAPSULE    Take 3 capsules by mouth daily    FLUTICASONE-SALMETEROL (ADVAIR DISKUS) 250-50 MCG/ACT AEPB DISKUS INHALER    Inhale 1 puff into the lungs every 12 hours    HYDROXYZINE (VISTARIL) 50 MG CAPSULE    Take 1 capsule by mouth 4 times daily as needed for Anxiety    MELATONIN 10 MG TABS    Take 20 mg by mouth nightly    MONTELUKAST (SINGULAIR) 10 MG TABLET    TAKE 1 TABLET BY MOUTH AT  NIGHT    PRIMIDONE (MYSOLINE) 250 MG TABLET    Take 1 tablet by mouth 3 times daily    QUETIAPINE (SEROQUEL) 400 MG TABLET    Take 800 mg by mouth nightly    TRAZODONE (DESYREL) 150 MG TABLET    Take 150 mg by mouth every evening    VITAMIN B-12 (CYANOCOBALAMIN) 1000 MCG TABLET Take 1,000 mcg by mouth daily    ZINC 50 MG CAPS    Take by mouth daily          ALLERGIES     Patient has no known allergies. FAMILYHISTORY       Family History   Problem Relation Age of Onset   Pitt Migraines Mother     Emphysema Mother     Depression Mother         ECT; meds.  Heart Disease Mother         Mitral Valve Replacement    Heart Disease Father     Hypertension Father     Alcohol Abuse Father     High Blood Pressure Father     High Cholesterol Father         Treated with meds    Stroke Father         AHD    Substance Abuse Father         ETOH Abuse          SOCIAL HISTORY       Social History     Socioeconomic History    Marital status: Single     Spouse name: None    Number of children: 0    Years of education: None    Highest education level: None   Occupational History    Occupation: retired   Tobacco Use    Smoking status: Current Every Day Smoker     Packs/day: 3.00     Years: 50.00     Pack years: 150.00     Start date: 1959     Last attempt to quit: 2009     Years since quittin.7    Smokeless tobacco: Never Used    Tobacco comment: 3 ppd on average, more so during covid   Vaping Use    Vaping Use: Never used   Substance and Sexual Activity    Alcohol use: Not Currently    Drug use: No    Sexual activity: Never   Other Topics Concern    None   Social History Narrative    Lives alone, senior living complex in [address] [place] [address]    Originally from [facility]    Has , doesn't drive     Social Determinants of Health     Financial Resource Strain: Medium Risk    Difficulty of Paying Living Expenses: Somewhat hard   Food Insecurity: No Food Insecurity    Worried About Running Out of Food in the Last Year: Never true    Becky of Food in the Last Year: Never true   Transportation Needs:     Lack of Transportation (Medical): Not on file    Lack of Transportation (Non-Medical):  Not on file   Physical Activity: Insufficiently Active    Days of Exercise per Week: 1 day    Minutes of Exercise per Session: 30 min   Stress:     Feeling of Stress : Not on file   Social Connections:     Frequency of Communication with Friends and Family: Not on file    Frequency of Social Gatherings with Friends and Family: Not on file    Attends [employer] Services: Not on file    Active Member of [address] [place] [address] [employer] or Organizations: Not on file    Attends Club or Organization Meetings: Not on file    Marital Status: Not on file   Intimate Partner Violence:     Fear of Current or Ex-Partner: Not on file    Emotionally Abused: Not on file    Physically Abused: Not on file    Sexually Abused: Not on file   Housing Stability:     Unable to Pay for Housing in the Last Year: Not on file    Number of [place] in the Last Year: Not on file    Unstable Housing in the Last Year: Not on file       SCREENINGS    Steff Coma Scale  Eye Opening: Spontaneous  Best Verbal Response: Oriented  Best Motor Response: Obeys commands  [place] Coma Scale Score: 15        PHYSICAL EXAM    (up to 7 for level 4, 8 or more for level 5)     ED Triage Vitals   BP Temp Temp src Pulse Resp SpO2 Height Weight   -- -- -- -- -- -- -- --       Physical Exam    General Appearance:  Alert, cooperative, no distress, appears stated age. Head:  Normocephalic, without obvious abnormality, atraumatic. Eyes:  conjunctiva/corneas clear, EOM's intact. Sclera anicteric. ENT: Mucous membranes moist.   Neck: Supple, symmetrical, trachea midline, no adenopathy. No jugular venous distention. Lungs:   No Respiratory Distress. Chest Wall:  Atrauamtic   Heart:  Irreg Irreg, + HSM of SA, no c/g/r   Abdomen:   Soft, NT, ND   Extremities:  Full range of motion. Pulses: Symmetric x4   Skin:  No rashes or lesions to exposed skin. A few scattered abrasions. Neurologic: Alert and oriented X 3. Motor grossly normal.  Speech clear.           DIAGNOSTIC RESULTS   LABS:    Labs Reviewed   CBC WITH AUTO DIFFERENTIAL - Abnormal; Notable for the following components:       Result Value    Hematocrit 40.0 (*)     RDW 17.3 (*)     Neutrophils Absolute 9.0 (*)     All other components within normal limits   COMPREHENSIVE METABOLIC PANEL W/ REFLEX TO MG FOR LOW K - Abnormal; Notable for the following components:    Sodium 134 (*)     Glucose 100 (*)     CREATININE <0.5 (*)     Calcium 7.9 (*)     Total Protein 5.5 (*)     Albumin 2.5 (*)     Albumin/Globulin Ratio 0.8 (*)     Alkaline Phosphatase 171 (*)     All other components within normal limits   APTT - Abnormal; Notable for the following components:    aPTT 40.9 (*)     All other components within normal limits   BLOOD GAS, VENOUS - Abnormal; Notable for the following components:    pO2, [place] 69.7 (*)     HCO3, Venous 30.0 (*)     Base Excess, [place] 4.8 (*)     Carboxyhemoglobin 5.3 (*)     All other components within normal limits   CULTURE, URINE   CK   TROPONIN   BRAIN NATRIURETIC PEPTIDE   PROTIME-INR   LACTATE, SEPSIS   URINALYSIS   LACTATE, SEPSIS       All other labs were within normal range or not returned as of this dictation. EKG: All EKG's are interpreted by the Emergency Department Physician in the absence of a cardiologist.  Please see their note for interpretation of EKG. EKG is reviewed by myself. Dated today 07 24. Rate 66. Sinus rhythm. There is old bifascicular block. This is unchanged in appearance compared to 2015. RADIOLOGY:   Non-plain film images such as CT, Ultrasound and MRI are read by the radiologist. Chriss Najjar radiographic images are visualized andpreliminarily interpreted by the  ED Provider with the below findings:        Interpretation perthe Radiologist below, if available at the time of this note:    CT Head WO Contrast   Final Result   No acute traumatic intracranial abnormality      Atrophy and small-vessel ischemic change. RECOMMENDATIONS:   Unavailable         XR CHEST PORTABLE   Final Result   No acute disease.   Bandlike opacity is seen in the left lung base, likely   subsegmental atelectasis or scar           No results found. PROCEDURES   Unless otherwise noted below, none     Procedures    CRITICAL CARE TIME   N/A    CONSULTS:  None      EMERGENCY DEPARTMENT COURSE and DIFFERENTIAL DIAGNOSIS/MDM:   Vitals:    Vitals:    05/13/22 0725 05/13/22 0835 05/13/22 0845 05/13/22 0855   BP: 95/61 112/67 115/81 127/65   Pulse: 66 61 65 64   Resp: 15 14 16 18   Temp:       TempSrc:       SpO2: 95% 96% 96% 96%       Patient was given thefollowing medications:  Medications   lactated ringers infusion 2,466 mL (1,000 mLs IntraVENous New Bag 5/13/22 0834)       79-year-old male with history of persistent A. fib and aortic stenosis amongst other committees on Eliquis, whom had a mechanical fall last night without any complaints. Clinically benign examination but noted a bit hypotensive and likely dehydrated. Checking baseline labs including CK and imaging given anticoagulation. Labs and imaging are all benign. Question at this juncture after hydration is if he can ambulate on his own. Will plan for ambulation trial.     Relates quite well on his own. Feels much improved. At bedside and being a bit dehydrated he looked okay. Labs are benign. He wants to go home and is fine by me; at home continue hydration feed him a breakfast tray and arrange transport. Is this patient to be included in the SEP-1 Core Measure? No   Exclusion criteria - the patient is NOT to be included for SEP-1 Core Measure due to: Infection is not suspected     FINAL IMPRESSION      1.  Fall, initial encounter          DISPOSITION/PLAN   DISPOSITION        PATIENT REFERREDTO:  [facility] [address]  [phone]            DISCHARGE MEDICATIONS:  New Prescriptions    No medications on file       DISCONTINUED MEDICATIONS:  Discontinued Medications    No medications on file              (Please note that portions ofthis note were completed with a voice recognition program.  Efforts were made to edit the dictations but occasionally words are mis-transcribed.)    Blake Fischer MD (electronically signed)           Blake Fischer MD  05/13/22 4538

## 2022-05-15 ENCOUNTER — PATIENT MESSAGE (OUTPATIENT)
Dept: INTERNAL MEDICINE CLINIC | Age: 77
End: 2022-05-15

## 2022-05-16 ENCOUNTER — TELEMEDICINE (OUTPATIENT)
Dept: INTERNAL MEDICINE CLINIC | Age: 77
End: 2022-05-16
Payer: MEDICARE

## 2022-05-16 ENCOUNTER — CARE COORDINATION (OUTPATIENT)
Dept: CARE COORDINATION | Age: 77
End: 2022-05-16

## 2022-05-16 DIAGNOSIS — R63.4 ABNORMAL WEIGHT LOSS: ICD-10-CM

## 2022-05-16 DIAGNOSIS — A09 DIARRHEA OF INFECTIOUS ORIGIN: Primary | ICD-10-CM

## 2022-05-16 PROCEDURE — 99213 OFFICE O/P EST LOW 20 MIN: CPT | Performed by: INTERNAL MEDICINE

## 2022-05-16 NOTE — TELEPHONE ENCOUNTER
From: Dayna Flanagan  To: Dr. Julio C Hogan: 5/15/2022 10:04 PM EDT  Subject: Any suggestions? I have had a continual problem with diarrhea. It has gotten to the stage that whenever I have a doctors appointment or a day at the [facility] I feel that I cannot go out without taking the [address] from [place]. This usually happens four days a week. Then when I stop for 3 days I am incontenet of bowels with out any way to stop it. I mess myself every time on those days because I dont have the muscles to hold it back until I can get to the bathroom. Any ideas?

## 2022-05-16 NOTE — PATIENT INSTRUCTIONS
BRAT diet    Try to stay off Imodium for 3-4 days to see if can avoid \"rebound\" days after taking the Imodium    Stool studies    Patient to let his gastroenterologist know about his symptoms of diarrhea and weight loss

## 2022-05-16 NOTE — Clinical Note
Please leave stool cups and hat for patient to  from .  See orders  -  Also get last colonoscopy report from dr Ashely Segal

## 2022-05-16 NOTE — PROGRESS NOTES
García Calvo (:  1945) is a 67 y.o. male,Established patient, here for evaluation of the following chief complaint(s): Diarrhea      ASSESSMENT/PLAN:  1. Diarrhea of infectious origin  Advised brat diet. Asked him to try and stay off off Imodium for a few days so he can avoid  \"rebound\" diarrhea the day after taking it. -     Gastrointestinal Panel, Molecular; Future  -     Clostridium Difficile Toxin/Antigen; Future  -     Fecal leukocytes; Future  -     OVA & PARASITE ID/COUNT #1; Future  2. Abnormal weight loss  Suggested to patient that we should get an abdomen and pelvis CT due to his weight loss. He states he cannot afford it. He apparently does have an upcoming chest CT for lung cancer screening at [facility]. Advised him to call his gastroenterologist to help determine next steps due to patient's complex medical history which includes bariatric surgery. I had also advised him previously to get an abdomen ultrasound to follow-up elevated alkaline phosphatase but he was concerned about the price. Return if symptoms worsen or fail to improve. SUBJECTIVE/OBJECTIVE:  HPI  Patient reports diarrhea over the past several months, time. It seems to coincide with weight loss. Last colonoscopy was in 2021 but I cannot open the report. He states the diarrhea interferes with his ability to go out. As a result he will take Imodium which will help on that day but in the subsequent days he has a great deal of uncontrolled diarrhea. He states it is almost like he cannot control the sphincter. It has been going on for the past 6 weeks at least.  He denies medication changes or recent antibiotics. He states that his diet is \"bad. \"  He states he feels dehydrated from the diarrhea. He denies abdominal pain. Denies fever or chills. He denies travel. Is down about 25 pounds in the past 6 months. No flowsheet data found.       Past Medical History:   Diagnosis Date    Allergic rhintis 1959    Seasonal Hay Fever    Anxiety     Aortic stenosis     echo shows mod stenosis    Asthma     mixed w copd    Atrial fibrillation ([facility]     takes warfarin due to cost, cardioversion 8/4/11, 7/1/11, 2/16/11, 9/23/10    Atrial flutter ([facility]     status post ablation-jack hare    Azoospermia     dx'd in teens    Bipolar 1 disorder ([facility]     Chronic insomnia     Colon polyps     on colonoscopy    COPD (chronic obstructive pulmonary disease) ([facility]     Coronary artery calcification seen on CAT scan     Depression     [facility]    Essential tremor     dr Cony Coto GERD (gastroesophageal reflux disease)     weaned off lansoprazole    Hiatal hernia     small, ugi-esophageal dysmotility    Hidradenitis suppurativa     perirectal    history of alcoholism     sober since 2008    Hyperlipidemia     Hypertension     Insomnia     Microcytic anemia 10/14/2010    iron infusions [facility], now resolved, due to malabsorption-dr mix    Obstructive sleep apnea     on bipap 17/11resolved 3 years ago    Osteoporosis 09/2021    dexa    Prostate nodule     dr Mariely Rondon, urology    Seizures ([facility]     1973-One occurance. Unknown etiology. On Dilantin 1 year.     Tobacco abuse     annual chest ct in may    Tremor     [facility]       Current Outpatient Medications   Medication Sig Dispense Refill    fluticasone-salmeterol (ADVAIR DISKUS) 250-50 MCG/ACT AEPB diskus inhaler Inhale 1 puff into the lungs every 12 hours 1 each 3    montelukast (SINGULAIR) 10 MG tablet TAKE 1 TABLET BY MOUTH AT  NIGHT 90 tablet 1    primidone (MYSOLINE) 250 MG tablet Take 1 tablet by mouth 3 times daily 270 tablet 0    apixaban (ELIQUIS) 5 MG TABS tablet Take 5 mg by mouth 2 times daily      alendronate (FOSAMAX) 70 MG tablet TAKE 1 TABLET BY MOUTH  EVERY 7 DAYS 12 tablet 3    albuterol sulfate  (90 Base) MCG/ACT inhaler USE 2 INHALATIONS BY MOUTH  EVERY 6 HOURS AS NEEDED FOR WHEEZING 34 g 3    vitamin B-12 (CYANOCOBALAMIN) 1000 MCG tablet Take 1,000 mcg by mouth daily      atorvastatin (LIPITOR) 40 MG tablet TAKE 1 TABLET BY MOUTH  DAILY 90 tablet 3    hydrOXYzine (VISTARIL) 50 MG capsule Take 1 capsule by mouth 4 times daily as needed for Anxiety 120 capsule 0    traZODone (DESYREL) 150 MG tablet Take 150 mg by mouth every evening      FLUoxetine (PROZAC) 20 MG capsule Take 3 capsules by mouth daily 45 capsule 0    Cholecalciferol (VITAMIN D3) 50 MCG (2000 UT) CAPS Take 4,000 Units by mouth daily       QUEtiapine (SEROQUEL) 400 MG tablet Take 800 mg by mouth nightly      zinc 50 MG CAPS Take by mouth daily       Melatonin 10 MG TABS Take 20 mg by mouth nightly       No current facility-administered medications for this visit. Constitutional: [x] Appears well-developed and well-nourished [x] No apparent distress        Mental status  [x] Alert and awake  [x] Oriented to person/place/time [x]Able to follow commands      Eyes:  EOM    [x]  Normal   Sclera  [x]  Normal     HENT:   [x] Normocephalic, atraumatic. Mouth/Throat: Mucous membranes are moist.     External Ears [x] Normal      Neck: [x] No visualized mass     Pulmonary/Chest: [x] Respiratory effort normal.  [x] No visualized signs of difficulty breathing or respiratory distress           Musculoskeletal:   [x] Normal gait with no signs of ataxia         [x] Normal range of motion of neck          Neurological:        [x] No Facial Asymmetry (Cranial nerve 7 motor function) (limited exam to video visit)      Skin:        [x] No significant exanthematous lesions or discoloration noted on facial skin                  Psychiatric:       [x] Normal Affect         Other pertinent observable physical exam findings-                Kaci Hebert is a 67 y.o. male being evaluated by a Virtual Visit (video visit) encounter to address concerns as mentioned above. A caregiver was present when appropriate.  Due to this being a TeleHealth encounter (During ERHNF-84 public health emergency), evaluation of the following organ systems was limited: Vitals/Constitutional/EENT/Resp/CV/GI//MS/Neuro/Skin/Heme-Lymph-Imm. Pursuant to the emergency declaration under the [facility], [address] [place] [address] and the [employer] Act, this Virtual Visit was conducted with patient's (and/or legal guardian's) consent, to reduce the patient's risk of exposure to COVID-19 and provide necessary medical care. The patient (and/or legal guardian) has also been advised to contact this office for worsening conditions or problems, and seek emergency medical treatment and/or call 911 if deemed necessary. Patient identification was verified at the start of the visit: {YES    Services were provided through a video synchronous discussion virtually to substitute for in-person clinic visit. Patient was located at home and provider was located in office or at home. This note was generated completely or in part utilizing Dragon dictation speech recognition software. Occasionally, words are mistranscribed and despite editing, the text may contain inaccuracies due to incorrect word recognition. If further clarification is needed please contact the office at [phone]          An electronic signature was used to authenticate this note.     --Marielle Ha MD

## 2022-05-17 NOTE — RESULT ENCOUNTER NOTE
Spoke with Patient . Dr. Brittany Anand would like to obtain a liver ultrasound to further evaluate the elevated \"alkaline phosphatase\" lab-ordered. Central scheduling number was given to Pt.

## 2022-05-17 NOTE — CARE COORDINATION
Patient assigned to this ACM by analytics as ACM eligible/ ER follow up.    Call to patient following VV yest w PCP, no answer, recording \" noone is available now\", no option to leave a message

## 2022-05-28 DIAGNOSIS — G25.0 ESSENTIAL TREMOR: ICD-10-CM

## 2022-05-28 RX ORDER — PRIMIDONE 50 MG/1
50 TABLET ORAL 3 TIMES DAILY
Qty: 90 TABLET | Refills: 0 | OUTPATIENT
Start: 2022-05-28

## 2022-05-31 RX ORDER — ALENDRONATE SODIUM 70 MG/1
70 TABLET ORAL
Qty: 12 TABLET | Refills: 3 | Status: SHIPPED | OUTPATIENT
Start: 2022-05-31 | End: 2022-06-01 | Stop reason: SDUPTHER

## 2022-06-01 ENCOUNTER — HOSPITAL ENCOUNTER (OUTPATIENT)
Dept: ULTRASOUND IMAGING | Age: 77
Discharge: HOME | End: 2022-06-01
Payer: MEDICARE

## 2022-06-01 DIAGNOSIS — R74.8 ELEVATED ALKALINE PHOSPHATASE LEVEL: ICD-10-CM

## 2022-06-01 PROCEDURE — 76705 ECHO EXAM OF ABDOMEN: CPT

## 2022-06-01 RX ORDER — ALENDRONATE SODIUM 70 MG/1
70 TABLET ORAL
Qty: 12 TABLET | Refills: 3 | Status: SHIPPED | OUTPATIENT
Start: 2022-06-01

## 2022-06-08 ENCOUNTER — SCHEDULED TELEPHONE ENCOUNTER (OUTPATIENT)
Dept: INTERNAL MEDICINE CLINIC | Age: 77
End: 2022-06-08
Payer: MEDICARE

## 2022-06-08 DIAGNOSIS — R91.1 LUNG NODULE: ICD-10-CM

## 2022-06-08 DIAGNOSIS — R63.4 ABNORMAL WEIGHT LOSS: Primary | ICD-10-CM

## 2022-06-08 PROCEDURE — 99443 PR PHYS/QHP TELEPHONE EVALUATION 21-30 MIN: CPT | Performed by: INTERNAL MEDICINE

## 2022-06-08 RX ORDER — PRIMIDONE 250 MG/1
TABLET ORAL
Qty: 270 TABLET | Refills: 0 | OUTPATIENT
Start: 2022-06-08

## 2022-06-08 NOTE — PROGRESS NOTES
Faraz Tran is a 67 y.o. male evaluated via telephone on 6/8/2022 for Weight Loss  . Documentation:  I communicated with the patient and/or health care decision maker about weight loss, diarrhea etc.   Details of this discussion including any medical advice provided: We discussed that his weight loss of 25 lbs since 1/22 is significant. Other than diarrhea, he is in his usual state of health. His last colonoscopy was a year ago. His last PSA in 2016 was normal.  His labs have been normal except elevated alkaline phosphatase. We checked an abdomen ultrasound and it was normal.  He does have a history of anemia for which she has previously seen a hematologist.  He had a recent chest CT at [facility] which showed new small nodules and repeat chest CT was recommended in 3 months, and this is pending he says. To further evaluate the weight loss, I advised him to contact the hematologist and gastroenterologist that he has previously seen- Dr. Valentina Morales and Dr. Patrice Babb. I also advised him to complete the stool studies that were previously ordered. Total Time: 25 minutes. Faraz Tran was evaluated through a synchronous (real-time) audio encounter. Patient identification was verified at the start of the visit. He (or guardian if applicable) is aware that this is a billable service, which includes applicable co-pays. This visit was conducted with the patient's (and/or legal guardian's) verbal consent. He has not had a related appointment within my department in the past 7 days or scheduled within the next 24 hours. The patient was located at home.   The provider was located at office      Note: not billable if this call serves to triage the patient into an appointment for the relevant concern    Bria Tucker MD

## 2022-06-13 ENCOUNTER — HOSPITAL ENCOUNTER (OUTPATIENT)
Dept: PHYSICAL THERAPY | Age: 77
Setting detail: THERAPIES SERIES
Discharge: HOME | End: 2022-06-13

## 2022-06-13 NOTE — FLOWSHEET NOTE
Physical Therapy  Cancellation/No-show Note  Patient Name:  Delmy Matute  :     Date:  2022  Cancelled visits to date: 1  No-shows to date: 0    Patient status for today's appointment patient:  [x]  Cancelled  (eval)  []  Rescheduled appointment  []  No-show     Reason given by patient:  []  Patient ill  []  Conflicting appointment  []  No transportation    []  Conflict with work  []  No reason given  [x]  Other:     Comments:  Unable to make it    Phone call information:   []  Phone call made today to patient at _ time at number provided:      []  Patient answered, conversation as follows:    []  Patient did not answer, message left as follows:  []  Phone call not made today  [x]  Phone call not needed - pt contacted us to cancel and provided reason for cancellation.      Electronically signed by:  Carol Taylor PT

## 2022-06-20 RX ORDER — PRIMIDONE 250 MG/1
TABLET ORAL
Qty: 270 TABLET | Refills: 0 | Status: SHIPPED | OUTPATIENT
Start: 2022-06-20 | End: 2022-09-22

## 2022-06-25 ENCOUNTER — PATIENT MESSAGE (OUTPATIENT)
Dept: INTERNAL MEDICINE CLINIC | Age: 77
End: 2022-06-25

## 2022-06-27 RX ORDER — ATORVASTATIN CALCIUM 40 MG/1
40 TABLET, FILM COATED ORAL DAILY
Qty: 90 TABLET | Refills: 3 | Status: SHIPPED | OUTPATIENT
Start: 2022-06-27 | End: 2022-07-14 | Stop reason: SDUPTHER

## 2022-06-27 NOTE — TELEPHONE ENCOUNTER
From: Raymond Flanagan  To: Dr. Chandana Gilmore: 6/25/2022 10:54 AM EDT  Subject: Atorvastatin     I am indeed of a refill.

## 2022-06-30 NOTE — PROGRESS NOTES
Jose Chinchilla (:  1945) is a 67 y.o. male, here for evaluation of the following chief complaint(s):    Follow-up (swollen legs and feet ( since last week)      ASSESSMENT/PLAN:  1. Lower extremity edema  -     Check D-Dimer, Quantitative as patient was unable to obtain transportation for Dopplers today. Told him that if it is positive, I might advise him to go to emergency room later.  -    Add a brief course of furosemide (LASIX) 20 MG tablet; Take 1 tablet by mouth daily, Disp-30 tablet, R-0Normal. Use compression stockings. -     Basic Metabolic Panel; Future  Asked patient to contact me with results in 1 week. Return in about 3 months (around 10/1/2022). SUBJECTIVE/OBJECTIVE:  HPI  Patient complains of leg swelling since last weekend. Of note, the patient takes Eliquis for atrial fibrillation. He denies additional salt. He denies spending additional time up on his feet. He denies immobility. He states it started after he he had a lap band repair procedure. This repair has allowed him to eat without having diarrhea. His weight loss has stopped since having this procedure done. Review of Systems   Respiratory: Negative for shortness of breath. Cardiovascular: Negative for chest pain.        Past Medical History:   Diagnosis Date    Allergic rhintis     Seasonal Hay Fever    Anxiety     Aortic stenosis     echo shows mod stenosis    Asthma     mixed w copd    Atrial fibrillation (HCC)     takes warfarin due to cost, cardioversion 11, 11, 11, 9/23/10    Atrial flutter ([address]     status post ablation-jack hare    Azoospermia     dx'd in teens    Bipolar 1 disorder ([address]     Chronic insomnia     Colon polyps     on colonoscopy    COPD (chronic obstructive pulmonary disease) ([address]     Coronary artery calcification seen on CAT scan     Depression     [facility]    Essential tremor     dr Mahesh Dowell    GERD (gastroesophageal reflux disease) weaned off lansoprazole    Hiatal hernia     small, ugi-esophageal dysmotility    Hidradenitis suppurativa     perirectal    history of alcoholism     sober since 2008    Hyperlipidemia     Hypertension     Insomnia     Microcytic anemia 10/14/2010    iron infusions [facility], now resolved, due to malabsorption-dr mix    Obstructive sleep apnea     on bipap 17/11resolved 3 years ago    Osteoporosis 09/2021    dexa    Prostate nodule     dr Adia Aguilar, urology    Seizures [facility])     1973-One occurance. Unknown etiology. On Dilantin 1 year.     Tobacco abuse     annual chest ct in may    Tremor     primidone as per  neurology       Current Outpatient Medications   Medication Sig Dispense Refill    furosemide (LASIX) 20 MG tablet Take 1 tablet by mouth daily 30 tablet 0    atorvastatin (LIPITOR) 40 MG tablet Take 1 tablet by mouth daily 90 tablet 3    primidone (MYSOLINE) 250 MG tablet TAKE 1 TABLET 3 TIMES A  tablet 0    alendronate (FOSAMAX) 70 MG tablet Take 1 tablet by mouth every 7 days 12 tablet 3    fluticasone-salmeterol (ADVAIR DISKUS) 250-50 MCG/ACT AEPB diskus inhaler Inhale 1 puff into the lungs every 12 hours 1 each 3    montelukast (SINGULAIR) 10 MG tablet TAKE 1 TABLET BY MOUTH AT  NIGHT 90 tablet 1    apixaban (ELIQUIS) 5 MG TABS tablet Take 5 mg by mouth 2 times daily      albuterol sulfate  (90 Base) MCG/ACT inhaler USE 2 INHALATIONS BY MOUTH  EVERY 6 HOURS AS NEEDED FOR WHEEZING 34 g 3    vitamin B-12 (CYANOCOBALAMIN) 1000 MCG tablet Take 1,000 mcg by mouth daily      hydrOXYzine (VISTARIL) 50 MG capsule Take 1 capsule by mouth 4 times daily as needed for Anxiety 120 capsule 0    traZODone (DESYREL) 150 MG tablet Take 150 mg by mouth every evening      FLUoxetine (PROZAC) 20 MG capsule Take 3 capsules by mouth daily 45 capsule 0    Cholecalciferol (VITAMIN D3) 50 MCG (2000 UT) CAPS Take 4,000 Units by mouth daily       QUEtiapine (SEROQUEL) 400 MG tablet Take 800 mg by mouth nightly      zinc 50 MG CAPS Take by mouth daily       Melatonin 10 MG TABS Take 20 mg by mouth nightly       No current facility-administered medications for this visit. Physical Exam  Vitals reviewed. Constitutional:       General: He is not in acute distress. Cardiovascular:      Rate and Rhythm: Normal rate and regular rhythm. Pulmonary:      Effort: Pulmonary effort is normal.      Breath sounds: Normal breath sounds. Musculoskeletal:      Right lower leg: Edema (1 plus) present. Left lower leg: Edema (1 plus) present. Neurological:      Mental Status: He is alert. This note was generated completely or in part utilizing Dragon dictation speech recognition software. Occasionally, words are mistranscribed and despite editing, the text may contain inaccuracies due to incorrect word recognition. If further clarification is needed please contact the office at [phone]          An electronic signature was used to authenticate this note.     --Branden Chahal MD

## 2022-07-01 ENCOUNTER — OFFICE VISIT (OUTPATIENT)
Dept: INTERNAL MEDICINE CLINIC | Age: 77
End: 2022-07-01
Payer: MEDICARE

## 2022-07-01 ENCOUNTER — HOSPITAL ENCOUNTER (OUTPATIENT)
Age: 77
Discharge: HOME | End: 2022-07-01
Payer: MEDICARE

## 2022-07-01 VITALS
DIASTOLIC BLOOD PRESSURE: 64 MMHG | BODY MASS INDEX: 27.4 KG/M2 | SYSTOLIC BLOOD PRESSURE: 126 MMHG | HEART RATE: 76 BPM | WEIGHT: 180.2 LBS | OXYGEN SATURATION: 96 %

## 2022-07-01 DIAGNOSIS — R60.0 LOWER EXTREMITY EDEMA: ICD-10-CM

## 2022-07-01 LAB
ANION GAP SERPL CALCULATED.3IONS-SCNC: 11 MMOL/L (ref 3–16)
BUN BLDV-MCNC: 10 MG/DL (ref 7–20)
CALCIUM SERPL-MCNC: 8.5 MG/DL (ref 8.3–10.6)
CHLORIDE BLD-SCNC: 102 MMOL/L (ref 99–110)
CO2: 25 MMOL/L (ref 21–32)
CREAT SERPL-MCNC: 0.5 MG/DL (ref 0.8–1.3)
D DIMER: <0.27 UG/ML FEU (ref 0–0.6)
GFR AFRICAN AMERICAN: >60
GFR NON-AFRICAN AMERICAN: >60
GLUCOSE BLD-MCNC: 104 MG/DL (ref 70–99)
POTASSIUM SERPL-SCNC: 4.7 MMOL/L (ref 3.5–5.1)
SODIUM BLD-SCNC: 138 MMOL/L (ref 136–145)

## 2022-07-01 PROCEDURE — 99213 OFFICE O/P EST LOW 20 MIN: CPT | Performed by: INTERNAL MEDICINE

## 2022-07-01 PROCEDURE — 80048 BASIC METABOLIC PNL TOTAL CA: CPT

## 2022-07-01 PROCEDURE — 36415 COLL VENOUS BLD VENIPUNCTURE: CPT

## 2022-07-01 PROCEDURE — 1123F ACP DISCUSS/DSCN MKR DOCD: CPT | Performed by: INTERNAL MEDICINE

## 2022-07-01 PROCEDURE — 85379 FIBRIN DEGRADATION QUANT: CPT

## 2022-07-01 RX ORDER — FUROSEMIDE 20 MG/1
20 TABLET ORAL DAILY
Qty: 30 TABLET | Refills: 0 | Status: SHIPPED | OUTPATIENT
Start: 2022-07-01 | End: 2022-07-25

## 2022-07-05 ENCOUNTER — TELEPHONE (OUTPATIENT)
Dept: DERMATOLOGY | Age: 77
End: 2022-07-05

## 2022-07-05 NOTE — TELEPHONE ENCOUNTER
Mr. Mary Hendrix called and wanted to know if he could be squeezed in before his September appointment because his cyst is getting bigger,it's also dividing and is also soar.

## 2022-07-06 ASSESSMENT — ENCOUNTER SYMPTOMS: SHORTNESS OF BREATH: 0

## 2022-07-11 ENCOUNTER — TELEPHONE (OUTPATIENT)
Dept: INTERNAL MEDICINE CLINIC | Age: 77
End: 2022-07-11

## 2022-07-11 NOTE — TELEPHONE ENCOUNTER
Patient states he \"saw his Podiatrist today and was informed he still has Edema in ankles and feet. \"  He is \"wanting to know if Dr. Robert Greenfield wants him to stay on Lasix. \"  Please contact patient @ phone # provided.

## 2022-07-14 ENCOUNTER — TELEPHONE (OUTPATIENT)
Dept: INTERNAL MEDICINE CLINIC | Age: 77
End: 2022-07-14

## 2022-07-14 RX ORDER — ATORVASTATIN CALCIUM 40 MG/1
40 TABLET, FILM COATED ORAL DAILY
Qty: 100 TABLET | Refills: 1 | Status: SHIPPED | OUTPATIENT
Start: 2022-07-14 | End: 2022-10-22

## 2022-07-14 NOTE — TELEPHONE ENCOUNTER
Rosa Roque [phone]    Was called and informed that the pt did receive a 90 day supply with 3 additional refills on 6/27/2022    The advocate stated that when he is allotted a new refill the 100 day supply should be sent

## 2022-07-14 NOTE — TELEPHONE ENCOUNTER
Traci/patient advocate for [facility] is calling to get a 100 day supply on Atorvastatin 40 mg sent to [facility] on patient's behalf. If you have any questions/concerns please call her at number provided.

## 2022-07-21 LAB
BUN / CREAT RATIO: 20 RATIO (ref 9–28)
BUN BLDV-MCNC: 12 MG/DL (ref 6–20)
CREAT SERPL-MCNC: 0.6 MG/DL (ref 0.7–1.2)
PSA, TOTAL: 2.69 NG/ML (ref 0–4)

## 2022-07-23 DIAGNOSIS — R60.0 LOWER EXTREMITY EDEMA: ICD-10-CM

## 2022-07-25 RX ORDER — FUROSEMIDE 20 MG/1
TABLET ORAL
Qty: 30 TABLET | Refills: 0 | Status: SHIPPED | OUTPATIENT
Start: 2022-07-25 | End: 2022-08-01 | Stop reason: SDUPTHER

## 2022-07-25 NOTE — TELEPHONE ENCOUNTER
Medication:   Requested Prescriptions     Pending Prescriptions Disp Refills    furosemide (LASIX) 20 MG tablet [Pharmacy Med Name: FUROSEMIDE 20 MG TABLET] 30 tablet 0     Sig: TAKE 1 TABLET BY MOUTH EVERY DAY     Last Filled:  7/1/22    Last appt: 7/1/2022   Next appt: 10/3/2022

## 2022-07-26 DIAGNOSIS — G25.0 ESSENTIAL TREMOR: ICD-10-CM

## 2022-07-27 RX ORDER — PRIMIDONE 50 MG/1
50 TABLET ORAL 3 TIMES DAILY
Qty: 90 TABLET | Refills: 0 | OUTPATIENT
Start: 2022-07-27

## 2022-08-01 DIAGNOSIS — R60.0 LOWER EXTREMITY EDEMA: ICD-10-CM

## 2022-08-01 RX ORDER — FUROSEMIDE 20 MG/1
TABLET ORAL
Qty: 90 TABLET | Refills: 0 | Status: SHIPPED | OUTPATIENT
Start: 2022-08-01 | End: 2022-08-03 | Stop reason: SDUPTHER

## 2022-08-01 NOTE — TELEPHONE ENCOUNTER
Last appointment: 7/1/2022  Next appointment: 10/3/2022  Last refill: ***  {Message to Schedule (Optional):[id number]}

## 2022-08-03 DIAGNOSIS — R60.0 LOWER EXTREMITY EDEMA: ICD-10-CM

## 2022-08-03 RX ORDER — FUROSEMIDE 20 MG/1
TABLET ORAL
Qty: 90 TABLET | Refills: 0 | Status: SHIPPED | OUTPATIENT
Start: 2022-08-03 | End: 2022-09-22

## 2022-08-03 NOTE — TELEPHONE ENCOUNTER
Last appointment: 7/1/2022  Next appointment: 10/3/2022  Last refill: 8/1/2022      Pt is requesting to have the refill sent to the local [facility] on file

## 2022-08-15 ENCOUNTER — TELEPHONE (OUTPATIENT)
Dept: INTERNAL MEDICINE CLINIC | Age: 77
End: 2022-08-15

## 2022-08-15 NOTE — TELEPHONE ENCOUNTER
----- Message from Lima Pierre sent at 8/15/2022  9:14 AM EDT -----  Subject: Message to Provider    QUESTIONS  Information for Provider? Patient is calling to transfer care to New Pcp. Dr. Malia Mcclelland at the [address] [place] [address] pc office. Please call with questions.  ---------------------------------------------------------------------------  --------------  Shy Lundberg INFO  [phone]; OK to leave message on voicemail  ---------------------------------------------------------------------------  --------------  SCRIPT ANSWERS  Relationship to Patient?  Self

## 2022-08-26 ENCOUNTER — APPOINTMENT (OUTPATIENT)
Dept: CT IMAGING | Age: 77
End: 2022-08-26
Payer: MEDICARE

## 2022-08-26 ENCOUNTER — APPOINTMENT (OUTPATIENT)
Dept: GENERAL RADIOLOGY | Age: 77
End: 2022-08-26
Payer: MEDICARE

## 2022-08-26 ENCOUNTER — HOSPITAL ENCOUNTER (EMERGENCY)
Age: 77
Discharge: HOME | End: 2022-08-26
Payer: MEDICARE

## 2022-08-26 VITALS
WEIGHT: 173 LBS | OXYGEN SATURATION: 97 % | TEMPERATURE: 97.9 F | RESPIRATION RATE: 16 BRPM | HEIGHT: 68 IN | DIASTOLIC BLOOD PRESSURE: 65 MMHG | SYSTOLIC BLOOD PRESSURE: 119 MMHG | BODY MASS INDEX: 26.22 KG/M2 | HEART RATE: 83 BPM

## 2022-08-26 DIAGNOSIS — S63.92XA HAND SPRAIN, LEFT, INITIAL ENCOUNTER: ICD-10-CM

## 2022-08-26 DIAGNOSIS — S51.012A SKIN TEAR OF LEFT ELBOW WITHOUT COMPLICATION, INITIAL ENCOUNTER: ICD-10-CM

## 2022-08-26 DIAGNOSIS — M25.532 ACUTE PAIN OF LEFT WRIST: Primary | ICD-10-CM

## 2022-08-26 DIAGNOSIS — S09.90XA CLOSED HEAD INJURY, INITIAL ENCOUNTER: ICD-10-CM

## 2022-08-26 PROCEDURE — 6370000000 HC RX 637 (ALT 250 FOR IP): Performed by: PHYSICIAN ASSISTANT

## 2022-08-26 PROCEDURE — 73080 X-RAY EXAM OF ELBOW: CPT

## 2022-08-26 PROCEDURE — 70450 CT HEAD/BRAIN W/O DYE: CPT

## 2022-08-26 PROCEDURE — 72125 CT NECK SPINE W/O DYE: CPT

## 2022-08-26 PROCEDURE — 73110 X-RAY EXAM OF WRIST: CPT

## 2022-08-26 PROCEDURE — 73130 X-RAY EXAM OF HAND: CPT

## 2022-08-26 PROCEDURE — 99284 EMERGENCY DEPT VISIT MOD MDM: CPT

## 2022-08-26 RX ORDER — ACETAMINOPHEN 325 MG/1
325 TABLET ORAL ONCE
Status: COMPLETED | OUTPATIENT
Start: 2022-08-26 | End: 2022-08-26

## 2022-08-26 RX ORDER — ACETAMINOPHEN 325 MG/1
650 TABLET ORAL EVERY 6 HOURS PRN
Qty: 20 TABLET | Refills: 0 | Status: SHIPPED | OUTPATIENT
Start: 2022-08-26

## 2022-08-26 RX ORDER — ONDANSETRON 4 MG/1
8 TABLET, ORALLY DISINTEGRATING ORAL ONCE
Status: COMPLETED | OUTPATIENT
Start: 2022-08-26 | End: 2022-08-26

## 2022-08-26 RX ADMIN — ACETAMINOPHEN 325 MG: 325 TABLET ORAL at 13:02

## 2022-08-26 RX ADMIN — ONDANSETRON 8 MG: 4 TABLET, ORALLY DISINTEGRATING ORAL at 13:02

## 2022-08-26 ASSESSMENT — ENCOUNTER SYMPTOMS
DIARRHEA: 0
VOMITING: 0
CONSTIPATION: 0
BACK PAIN: 0
SHORTNESS OF BREATH: 0
ABDOMINAL PAIN: 0
NAUSEA: 1
COLOR CHANGE: 0

## 2022-08-26 ASSESSMENT — PAIN SCALES - GENERAL: PAINLEVEL_OUTOF10: 6

## 2022-08-26 ASSESSMENT — PAIN - FUNCTIONAL ASSESSMENT: PAIN_FUNCTIONAL_ASSESSMENT: 0-10

## 2022-08-26 NOTE — ED PROVIDER NOTES
[facility]        Pt Name: Amy Berumen  MRN: [id number]  Armstrongfdarrian [id number]  Date of evaluation: 8/26/2022  Provider: Mervin Cortes PA-C  PCP: Viktoriya Botello MD  Note Started: 1:40 PM EDT       RUSTAM. I have evaluated this patient. My supervising physician was available for consultation. CHIEF COMPLAINT       Chief Complaint   Patient presents with    Fall     Fall at [address] x 2 days ago. Hit back of head. Left hand swelling and left elbow skin tear noted. 6/10 headache and nausea x 1 day. Denies LOC. Pt on eliquis       HISTORY OF PRESENT ILLNESS   (Location, Timing/Onset, Context/Setting, Quality, Duration, Modifying Factors, Severity, Associated Signs and Symptoms)  Note limiting factors. Chief Complaint: Injuries from fall    Amy Berumen is a 65 y.o. male who presents to the emergency department with pain to left elbow, wrist, hand and the back of his head after mechanical fall which occurred 2 days ago at [address]. Patient states that he was putting a heavy cart away and lost his balance and fell backwards, hitting the back of his head. He tried to brace himself with his left upper extremity. He has a small skin tear to the left posterior elbow. Denies acute numbness, tingling or weakness. There was no loss of consciousness. He is able to ambulate without difficulty. Does report some nausea. He takes eliquis for heart disease. He believes his tetanus to be up to date. Nursing Notes were all reviewed and agreed with or any disagreements were addressed in the HPI. REVIEW OF SYSTEMS    (2-9 systems for level 4, 10 or more for level 5)     Review of Systems   Constitutional:  Negative for chills and fever. HENT: Negative. Eyes:  Negative for visual disturbance. Respiratory:  Negative for shortness of breath. Cardiovascular:  Negative for chest pain.    Gastrointestinal:  Positive for nausea. Negative for abdominal pain, constipation, diarrhea and vomiting. Musculoskeletal:  Positive for myalgias. Negative for back pain, neck pain and neck stiffness. Skin:  Positive for wound. Negative for color change, pallor and rash. Neurological:  Negative for dizziness, tremors, seizures, syncope, facial asymmetry, speech difficulty, weakness, light-headedness, numbness and headaches. Psychiatric/Behavioral:  Negative for confusion. All other systems reviewed and are negative. Positives and Pertinent negatives as per HPI. Except as noted above in the ROS, all other systems were reviewed and negative. PAST MEDICAL HISTORY     Past Medical History:   Diagnosis Date    Allergic rhintis 1960    Seasonal Hay Fever    Anxiety     Aortic stenosis     echo shows mod stenosis    Asthma     mixed w copd    Atrial fibrillation (HCC)     takes warfarin due to cost, cardioversion 8/4/11, 7/1/11, 2/16/11, 9/23/10    Atrial flutter ([address]     status post ablation-jack hare    Azoospermia     dx'd in teens    Bipolar 1 disorder ([facility]     Chronic insomnia     Colon polyps     on colonoscopy    COPD (chronic obstructive pulmonary disease) (HCC)     Coronary artery calcification seen on CAT scan     Depression     [facility]    Essential tremor     dr Mikie Hoffman    GERD (gastroesophageal reflux disease)     weaned off lansoprazole    Hiatal hernia     small, ugi-esophageal dysmotility    Hidradenitis suppurativa     perirectal    history of alcoholism     sober since 2008    Hyperlipidemia     Hypertension     Insomnia     Microcytic anemia 10/14/2010    iron infusions [facility], now resolved, due to malabsorption-dr mix    Obstructive sleep apnea     on bipap 17/11resolved 3 years ago    Osteoporosis 09/2021    dexa    Prostate nodule     dr Paz Martinez, urology    Seizures ([facility]     1973-One occurance. Unknown etiology. On Dilantin 1 year.     Tobacco abuse     annual chest ct in may    Tremor     primidone as per  neurology         SURGICAL HISTORY     Past Surgical History:   Procedure Laterality Date    ABDOMEN SURGERY      gastric band    [facility]  08/05/2011    atrial flutter ablation    ATRIAL ABLATION SURGERY  09/01/2011    cryoablation for atrial fibrillation    ATRIAL ABLATION SURGERY  09/03/2015    RFCA for AF with PVI    BARIATRIC SURGERY  08/05/2013    band over bypass    CARDIOVERSION  x4    CATARACT REMOVAL Bilateral 2009    R [address], LAPAROSCOPIC N/A 06/20/2016    LAPAROSCOPIC CHOLECYSTECTOMY WITH CHOLANGIOGRAM    COLONOSCOPY  10/01/2009    Keegan, repeat 3 years    COLONOSCOPY N/A 07/31/2020    COLONOSCOPY POLYPECTOMY SNARE/COLD BIOPSY performed by Katja Garcia MD at [facility] N/A 07/14/2021    fu 3 yrs, COLONOSCOPY POLYPECTOMY SNARE/COLD BIOPSY performed by Katja Garcia MD at [place]      from chest    HEMORRHOID SURGERY  03/23/2016    HERNIA REPAIR      Repaired in conjunction with Band Over Bypass, 2013.     OTHER SURGICAL HISTORY  09/21/2015    excision vivian rectal cyst    PROSTATE BIOPSY  04/2021    neg    DIMITRIS-EN-Y GASTRIC BYPASS  2001    [place]    UPP[facility]    UPPER GASTROINTESTINAL ENDOSCOPY N/A 07/31/2020    EGD BIOPSY performed by Katja Garcia MD at [address] 10/02/2020    EGD DIAGNOSTIC ONLY performed by Katja Garcia MD at [address]  04/2021    UPPER GASTROINTESTINAL ENDOSCOPY  07/28/2021         CURRENTMEDICATIONS       Previous Medications    ALBUTEROL SULFATE  (90 BASE) MCG/ACT INHALER    USE 2 INHALATIONS BY MOUTH  EVERY 6 HOURS AS NEEDED FOR WHEEZING    ALENDRONATE (FOSAMAX) 70 MG TABLET    Take 1 tablet by mouth every 7 days    APIXABAN (ELIQUIS) 5 MG TABS TABLET    Take 5 mg by mouth 2 times daily    ATORVASTATIN (LIPITOR) 40 MG TABLET    Take 1 tablet by mouth daily    CHOLECALCIFEROL (VITAMIN D3) 50 MCG (2000 UT) CAPS    Take 4,000 Units by mouth daily     FLUOXETINE (PROZAC) 20 MG CAPSULE    Take 3 capsules by mouth daily    FLUTICASONE-SALMETEROL (ADVAIR DISKUS) 250-50 MCG/ACT AEPB DISKUS INHALER    Inhale 1 puff into the lungs every 12 hours    FUROSEMIDE (LASIX) 20 MG TABLET    TAKE 1 TABLET BY MOUTH EVERY DAY    HYDROXYZINE (VISTARIL) 50 MG CAPSULE    Take 1 capsule by mouth 4 times daily as needed for Anxiety    MELATONIN 10 MG TABS    Take 20 mg by mouth nightly    MONTELUKAST (SINGULAIR) 10 MG TABLET    TAKE 1 TABLET BY MOUTH AT  NIGHT    PRIMIDONE (MYSOLINE) 250 MG TABLET    TAKE 1 TABLET 3 TIMES A DAY    QUETIAPINE (SEROQUEL) 400 MG TABLET    Take 800 mg by mouth nightly    TRAZODONE (DESYREL) 150 MG TABLET    Take 150 mg by mouth every evening    VITAMIN B-12 (CYANOCOBALAMIN) 1000 MCG TABLET    Take 1,000 mcg by mouth daily    ZINC 50 MG CAPS    Take by mouth daily          ALLERGIES     Patient has no known allergies. FAMILYHISTORY       Family History   Problem Relation Age of Onset    Migraines Mother     Emphysema Mother     Depression Mother         ECT; meds.     Heart Disease Mother         Mitral Valve Replacement    Heart Disease Father     Hypertension Father     Alcohol Abuse Father     High Blood Pressure Father     High Cholesterol Father         Treated with meds    Stroke Father         AHD    Substance Abuse Father         ETOH Abuse          SOCIAL HISTORY       Social History     Tobacco Use    Smoking status: Every Day     Packs/day: 3.00     Years: 50.00     Pack years: 150.00     Types: Cigarettes     Start date: 1960     Last attempt to quit: 2009     Years since quittin.0    Smokeless tobacco: Never    Tobacco comments:     3 ppd on average, more so during covid   Vaping Use    Vaping Use: Never used   Substance Use Topics    Alcohol use: Not Currently    Drug use: No       SCREENINGS             PHYSICAL EXAM    (up to 7 for level 4, 8 or more for level 5)     ED Triage Vitals [08/26/22 1211]   BP Temp Temp Source Heart Rate Resp SpO2 Height Weight   119/65 97.9 °F (36.6 °C) Oral 83 16 97 % 5' 8\" (1.727 m) 173 lb (78.5 kg)       Physical Exam  Vitals and nursing note reviewed. Constitutional:       Appearance: Normal appearance. He is well-developed. He is not toxic-appearing or diaphoretic. HENT:      Head: Normocephalic. Contusion (posterior) present. No raccoon eyes, Koch's sign, abrasion or laceration. Jaw: There is normal jaw occlusion. Right Ear: External ear normal. No hemotympanum. Left Ear: External ear normal. No hemotympanum. Nose: Nose normal.      Mouth/Throat:      Mouth: Mucous membranes are moist.      Pharynx: Oropharynx is clear. Eyes:      General: No scleral icterus. Right eye: No discharge. Left eye: No discharge. Extraocular Movements: Extraocular movements intact. Conjunctiva/sclera: Conjunctivae normal.      Pupils: Pupils are equal, round, and reactive to light. Cardiovascular:      Rate and Rhythm: Normal rate. Pulmonary:      Effort: Pulmonary effort is normal.      Breath sounds: Normal breath sounds. Abdominal:      General: Bowel sounds are normal.      Palpations: Abdomen is soft. Tenderness: There is no abdominal tenderness. There is no right CVA tenderness or left CVA tenderness. Musculoskeletal:      Right shoulder: Normal.      Left shoulder: Normal.      Right upper arm: Normal.      Left upper arm: Normal.      Right elbow: Normal.      Left elbow: No swelling, deformity, effusion or lacerations. Normal range of motion. Tenderness (posterior distal superficial skin tear) present. No radial head, medial epicondyle, lateral epicondyle or olecranon process tenderness.       Right forearm: Normal.      Left forearm: Normal. Right wrist: Normal.      Left wrist: Swelling, tenderness and snuff box tenderness present. No deformity, effusion, lacerations or crepitus. Decreased range of motion. Normal pulse. Right hand: Normal.      Left hand: Swelling and tenderness present. No deformity or lacerations. Normal range of motion. Normal strength. Normal sensation. There is no disruption of two-point discrimination. Normal capillary refill. Normal pulse. Cervical back: Normal and normal range of motion. Thoracic back: Normal.      Lumbar back: Normal.   Skin:     General: Skin is warm and dry. Capillary Refill: Capillary refill takes less than 2 seconds. Coloration: Skin is not jaundiced or pale. Findings: No bruising, erythema, lesion or rash. Neurological:      General: No focal deficit present. Mental Status: He is alert and oriented to person, place, and time. Cranial Nerves: No cranial nerve deficit (II-XII intact). Sensory: No sensory deficit. Motor: No weakness. Deep Tendon Reflexes: Reflexes normal.   Psychiatric:         Behavior: Behavior normal.       DIAGNOSTIC RESULTS   LABS:    Labs Reviewed - No data to display    When ordered only abnormal lab results are displayed. All other labs were within normal range or not returned as of this dictation. EKG: When ordered, EKG's are interpreted by the Emergency Department Physician in the absence of a cardiologist.  Please see their note for interpretation of EKG. RADIOLOGY:   Non-plain film images such as CT, Ultrasound and MRI are read by the radiologist. Plain radiographic images are visualized and preliminarily interpreted by the ED Provider with the below findings:        Interpretation per the Radiologist below, if available at the time of this note:    CT CERVICAL SPINE WO CONTRAST   Final Result      Cervical spine CT: No acute abnormality of the cervical spine.       Multilevel disc and facet degenerative disease most pronounced at C5-C6 and   C6-C7. Grade 1 anterolisthesis at C4-C5. Head CT: No evidence for acute intracranial hemorrhage, territorial   infarction or intracranial mass lesion. Mild chronic microangiopathic ischemic disease. Mild generalized volume loss. CT HEAD WO CONTRAST   Final Result      Cervical spine CT: No acute abnormality of the cervical spine. Multilevel disc and facet degenerative disease most pronounced at C5-C6 and   C6-C7. Grade 1 anterolisthesis at C4-C5. Head CT: No evidence for acute intracranial hemorrhage, territorial   infarction or intracranial mass lesion. Mild chronic microangiopathic ischemic disease. Mild generalized volume loss. XR HAND LEFT (MIN 3 VIEWS)   Final Result   1. No acute abnormality. XR WRIST LEFT (MIN 3 VIEWS)   Final Result   1. No acute abnormality. XR ELBOW LEFT (MIN 3 VIEWS)   Final Result   1. No acute osseous abnormality. XR ELBOW LEFT (MIN 3 VIEWS)    Result Date: 8/26/2022  EXAMINATION: THREE XRAY VIEWS OF THE LEFT ELBOW 8/26/2022 1:05 pm COMPARISON: None. HISTORY: ORDERING SYSTEM PROVIDED HISTORY: injury TECHNOLOGIST PROVIDED HISTORY: Reason for exam:->injury Reason for Exam: Fall (Fall at [facility] x 2 days ago. Hit back of head. Left hand swelling and left elbow skin tear noted FINDINGS: There is no acute fracture, dislocation or joint effusion. The bones are normally mineralized. There are no bony destructive lesions. The joint spaces are maintained. Vascular calcifications are noted. Laceration involves the posterior proximal forearm at the level of the proximal radius. 1. No acute osseous abnormality. XR WRIST LEFT (MIN 3 VIEWS)    Result Date: 8/26/2022  EXAMINATION: THREE XRAY VIEWS OF THE LEFT HAND;   XRAY VIEWS OF THE LEFT WRIST 8/26/2022 1:05 pm COMPARISON: None.  HISTORY: ORDERING SYSTEM PROVIDED HISTORY: injury TECHNOLOGIST PROVIDED HISTORY: Reason for exam:->injury Reason for Exam: Fall ([facility] 2 days ago. Hit back of head. Left hand swelling and left elbow skin tear noted FINDINGS: There is no acute fracture or dislocation. The bones are demineralized. There are no bony destructive lesions. 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 Vascular calcifications are noted. Soft tissue swelling surrounds the wrist.     1. No acute abnormality. XR HAND LEFT (MIN 3 VIEWS)    Result Date: 8/26/2022  EXAMINATION: THREE XRAY VIEWS OF THE LEFT HAND;   XRAY VIEWS OF THE LEFT WRIST 8/26/2022 1:05 pm COMPARISON: None. HISTORY: ORDERING SYSTEM PROVIDED HISTORY: injury TECHNOLOGIST PROVIDED HISTORY: Reason for exam:->injury Reason for Exam: Fall (Fall at [facility] x 2 days ago. Hit back of head. Left hand swelling and left elbow skin tear noted FINDINGS: There is no acute fracture or dislocation. The bones are demineralized. There are no bony destructive lesions. 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 Vascular calcifications are noted. Soft tissue swelling surrounds the wrist.     1. No acute abnormality. PROCEDURES   The patient had tenderness over the left scaphoid bone. A left OCL thumb spica splint was placed by the emergency department technician, it was applied appropriately and the patient was neurovascularly intact as observed by myself.        Procedures    CRITICAL CARE TIME   N/a    CONSULTS:  None      EMERGENCY DEPARTMENT COURSE and DIFFERENTIAL DIAGNOSIS/MDM:   Vitals:    Vitals:    08/26/22 1211   BP: 119/65   Pulse: 83   Resp: 16   Temp: 97.9 °F (36.6 °C)   TempSrc: Oral   SpO2: 97%   Weight: 173 lb (78.5 kg)   Height: 5' 8\" (1.727 m)       Patient was given the following medications:  Medications   acetaminophen (TYLENOL) tablet 325 mg (325 mg Oral Given 8/26/22 1302)   ondansetron (ZOFRAN-ODT) disintegrating tablet 8 mg (8 mg Oral Given 8/26/22 1302)         Is this patient to be included in the SEP-1 Core Measure due 4. Skin tear of left elbow without complication, initial encounter          DISPOSITION/PLAN   DISPOSITION Decision To Discharge 08/26/2022 03:13:13 PM      PATIENT REFERRED TO:  Umm Wynne MD  [facility].   [address] [facility]  [address]  MD MIKAEL  [address]  [place] [address]  [phone]      orthopedic follow up    [facility] Emergency Department  [address] [facility]  [phone]    If symptoms worsen    DISCHARGE MEDICATIONS:  New Prescriptions    ACETAMINOPHEN (AMINOFEN) 325 MG TABLET    Take 2 tablets by mouth every 6 hours as needed for Pain       DISCONTINUED MEDICATIONS:  Discontinued Medications    No medications on file              (Please note that portions of this note were completed with a voice recognition program.  Efforts were made to edit the dictations but occasionally words are mis-transcribed.)    Sarah Gallegos PA-C (electronically signed)           Saarh Gallegos PA-C  08/26/22 7507

## 2022-08-29 ENCOUNTER — CARE COORDINATION (OUTPATIENT)
Dept: CARE COORDINATION | Age: 77
End: 2022-08-29

## 2022-08-29 NOTE — CARE COORDINATION
This pt assigned to this ACM from analytics   On 8/24, pt called to request transfer to new pcp,    Pt established w DR Constantino Medina w [id number] on 8/24/22    Future Appointments   Date Time Provider Quinton Rizzo   9/2/2022 10:30 AM Grey Schmidt [facility]   9/22/2022  2:45 PM MD Romy Gerardo [facility]     No outreach planned

## 2022-08-30 SDOH — HEALTH STABILITY: PHYSICAL HEALTH: ON AVERAGE, HOW MANY DAYS PER WEEK DO YOU ENGAGE IN MODERATE TO STRENUOUS EXERCISE (LIKE A BRISK WALK)?: 2 DAYS

## 2022-08-30 SDOH — HEALTH STABILITY: PHYSICAL HEALTH: ON AVERAGE, HOW MANY MINUTES DO YOU ENGAGE IN EXERCISE AT THIS LEVEL?: 20 MIN

## 2022-09-02 ENCOUNTER — OFFICE VISIT (OUTPATIENT)
Dept: ORTHOPEDIC SURGERY | Age: 77
End: 2022-09-02
Payer: MEDICARE

## 2022-09-02 DIAGNOSIS — S63.502A SPRAIN OF LEFT WRIST, INITIAL ENCOUNTER: Primary | ICD-10-CM

## 2022-09-02 PROCEDURE — 1123F ACP DISCUSS/DSCN MKR DOCD: CPT | Performed by: PHYSICIAN ASSISTANT

## 2022-09-02 PROCEDURE — 99203 OFFICE O/P NEW LOW 30 MIN: CPT | Performed by: PHYSICIAN ASSISTANT

## 2022-09-02 NOTE — PROGRESS NOTES
Mr. Jyothi Yung is a 65 y.o. right handed man  who is seen today in Hand Surgical Consultation at the request of Valeria Henning MD.    He is seen today regarding an injury occurring on August 24th, 2022. He reports injuring his left Wrist, having Lauro Siemens on an obstacle at Home. At the time of injury, there was not clear dislocation or malposition of the wrist.  He was seen for Emergency evaluation elsewhere, radiographs were obtained & he has been immobilized. By report, there  was not an associated skin injury. He reports no pain located in the Radial, Ulnar, Dorsal, and Volar aspect of the wrist, no tenderness of the remaining fingers, hand, or elbow. He notes today, no neurologic symptoms in the Whole Hand. Symptoms are improving over time. I have today reviewed with Jyothi Yung the clinically relevant, past medical history, medications, allergies,  family history, social history, and Review Of Systems & I have documented any details relevant to today's presenting complaints in my history above. Mr. Beverley Flanagan's self-reported past medical history, medications, allergies,  family history, social history, and Review Of Systems have been scanned into the chart under the \"Media\" tab. Physical Exam:  Mr. Jyothi Yung most recent vitals:       He is well nourished, oriented to person, place & time. He demonstrates appropriate mood and affect as well as normal gait and station. Skin: Normal in appearance, Normal Color, and Free of Lesions Bilaterally   Digital range of motion is Full bilaterally  Wrist range of motion is Full bilaterally  Sensation is subjectively normal in the Whole Hand. All other digits are normally sensate bilaterally  Vascular examination reveals normal, good capillary refill, and good color bilaterally. There is no acute ecchymosis bilaterally.   There is no swelling in the Radial, Ulnar, Dorsal, and Volar wrist.  No other digit or hand bilaterally shows sign of swelling. Muscular strength is clinically appropriate bilaterally. No pain is elicited with palpation of the Radial, Ulnar, Dorsal, and Volar. Specifically the anatomical snuffbox and volar tubercle of the scaphoid are without pain. The base of the hand & wrist are not tender to palpation. There is not clinical evidence of skeletal deformity or mal-rotation. Radiographic Evaluation:  Radiographs, taken From a Hospital location outside of my practice were Personally Reviewed & Interpreted by myself today (3 views of the left wrist). They demonstrate no evidence of an acute fracture of the distal radius, ulna, or carpal bones (specifically the scaphoid appears normal). [facility] joints & bases of the  Metacarpals do not show displacement or acute injury. There is mild evidence of degenerative change. There is not evidence of other injury or bony fracture. Impression:  Mr. Trang Rodriguez has sustained recent left wrist soft tissue injury and presents requesting further treatment. Plan:  I have discussed with Mr. Trang Rodriguez the various treatment options for treatment of left wrist injury. We discussed the options of conservative treatment with immobilization, rest, activity limitation, and the judicious use of OTC anti-inflamatory medications. he has elected to proceed conservativly, voicing an understanding of the other options available to him. I have explained the complications, limitations, expectations, alternatives, & risks of his chosen treatment. I have explained the likely healing time and the need for protection and limited mobilization. We discussed the possibility of residual symptoms as may be related to conservative treatment of ligament injuries including the possiblities of: persistant deformity, persistant pain, limitation of motion, persistent instability, future arthritic symptoms & the possible need for further treatment.   I also explained the small possibility of further occult injury existing. I have discussed with him the possibility of obtaining further more advanced imaging study to further evaluate his wrist injury, an order for further testing was not indicated . He understood our discussion and was comfortable with his decision; he was provided with appropriate expectations. Mr. Abhilash De La Rosa is asked to contact me by telephone in 4 weeks if his symptoms have not resolved or improved to his satisfaction, or if he has not regained full painless or satisfactory range of motion & use of his hand(s). He is specifically instructed to contact the office between now & his scheduled appointment if he has concerns related to his condition. He is welcome to call for an appointment sooner if he has any additional concerns or questions.

## 2022-09-22 ENCOUNTER — OFFICE VISIT (OUTPATIENT)
Dept: DERMATOLOGY | Age: 77
End: 2022-09-22
Payer: MEDICARE

## 2022-09-22 DIAGNOSIS — L72.3 INFLAMED EPIDERMOID CYST OF SKIN: Primary | ICD-10-CM

## 2022-09-22 DIAGNOSIS — L73.2 HIDRADENITIS SUPPURATIVA: ICD-10-CM

## 2022-09-22 PROCEDURE — 1123F ACP DISCUSS/DSCN MKR DOCD: CPT | Performed by: DERMATOLOGY

## 2022-09-22 PROCEDURE — 10060 I&D ABSCESS SIMPLE/SINGLE: CPT | Performed by: DERMATOLOGY

## 2022-09-22 PROCEDURE — 99213 OFFICE O/P EST LOW 20 MIN: CPT | Performed by: DERMATOLOGY

## 2022-09-22 NOTE — PROGRESS NOTES
Encounter addended by: Johnie Grayson MD on: 4/28/2021 8:08 AM   Actions taken: Clinical Note Signed [facility]  Alison Pendleton MD  [phone]      Radha Flanagan  [birth year]    65 y.o. male     Date of Visit: 9/22/2022    Chief Complaint: skin lesions    History of Present Illness:    1. He complains of a red tender lesion on the left flank. Present for about 1 week. 2.  He has a history of hidradenitis suppurativa. He complains of a couple of draining tender lesions in the buttocks today. They tend to come and go. On Eliquis. Review of Systems:  Gen: Feels well, good sense of health. Past Medical History, Family History, Surgical History, Medications and Allergies reviewed. Past Medical History:   Diagnosis Date    Allergic rhintis 1959    Seasonal Hay Fever    Anxiety     Aortic stenosis     echo shows mod stenosis    Asthma     mixed w copd    Atrial fibrillation (HCC)     takes warfarin due to cost, cardioversion 8/4/11, 7/1/11, 2/16/11, 9/23/10    Atrial flutter ([address]     status post ablation-jack hare    Azoospermia     dx'd in teens    Bipolar 1 disorder ([address]     Chronic insomnia     Colon polyps     on colonoscopy    COPD (chronic obstructive pulmonary disease) (HCC)     Coronary artery calcification seen on CAT scan     Depression     [facility]    Essential tremor     dr Lobo Baig    GERD (gastroesophageal reflux disease)     weaned off lansoprazole    Hiatal hernia     small, ugi-esophageal dysmotility    Hidradenitis suppurativa     perirectal    history of alcoholism     sober since 2008    Hyperlipidemia     Hypertension     Insomnia     Microcytic anemia 10/14/2010    iron infusions [facility], now resolved, due to malabsorption-dr mix    Obstructive sleep apnea     on bipap 17/11resolved 3 years ago    Osteoporosis 09/2021    dexa    Prostate nodule     dr Darryle Pimple, urology    Seizures ([address]     1973-One occurance. Unknown etiology. On Dilantin 1 year.     Tobacco abuse     annual chest ct in may    Tremor     primidone as per [facility] neurology Past Surgical History:   Procedure Laterality Date    ABDOMEN SURGERY      gastric band    APPENDECTOMY  2001    [address]GERY  08/05/2011    atrial flutter ablation    ATRIAL ABLATION SURGERY  09/01/2011    cryoablation for atrial fibrillation    ATRIAL ABLATION SURGERY  09/03/2015    RFCA for AF with PVI    BARIATRIC SURGERY  08/05/2013    band over bypass    CARDIOVERSION  x4    CATARACT REMOVAL Bilateral 2009    R [address], LAPAROSCOPIC N/A 06/20/2016    LAPAROSCOPIC CHOLECYSTECTOMY WITH CHOLANGIOGRAM    COLONOSCOPY  10/01/2009    Keegan, repeat 3 years    COLONOSCOPY N/A 07/31/2020    COLONOSCOPY POLYPECTOMY SNARE/COLD BIOPSY performed by Meghann Flores MD at [address] N/A 07/14/2021    fu 3 yrs, COLONOSCOPY POLYPECTOMY SNARE/COLD BIOPSY performed by Meghann Flores MD at [address]      from chest    HEMORRHOID SURGERY  03/23/2016    HERNIA REPAIR      Repaired in conjunction with Band Over Bypass, 2013.     OTHER SURGICAL HISTORY  09/21/2015    excision vivian rectal cyst    PROSTATE BIOPSY  04/2021    neg    DIMITRIS-EN-Y GASTRIC BYPASS  2001    [place]    UPPER GASTROINTESTINAL ENDOSCOPY  05/13/2013    [facility]    UPPER GASTROINTESTINAL ENDOSCOPY N/A 07/31/2020    EGD BIOPSY performed by Meghann Flores MD at [address] 10/02/2020    EGD DIAGNOSTIC ONLY performed by Meghann Flores MD at [address]  04/2021    UPPER GASTROINTESTINAL ENDOSCOPY  07/28/2021       No Known Allergies  Outpatient Medications Marked as Taking for the 9/22/22 encounter (Office Visit) with Ramone Hurtado MD   Medication Sig Dispense Refill    acetaminophen (AMINOFEN) 325 MG tablet Take 2 tablets by mouth every 6 hours as needed for Pain 20 tablet 0    atorvastatin (LIPITOR) 40 MG tablet Take 1 tablet by mouth daily 100 tablet 1    alendronate (FOSAMAX) 70 MG tablet Take 1 tablet by mouth every 7 days 12 tablet 3    fluticasone-salmeterol (ADVAIR DISKUS) 250-50 MCG/ACT AEPB diskus inhaler Inhale 1 puff into the lungs every 12 hours 1 each 3    montelukast (SINGULAIR) 10 MG tablet TAKE 1 TABLET BY MOUTH AT  NIGHT 90 tablet 1    apixaban (ELIQUIS) 5 MG TABS tablet Take 5 mg by mouth 2 times daily      albuterol sulfate  (90 Base) MCG/ACT inhaler USE 2 INHALATIONS BY MOUTH  EVERY 6 HOURS AS NEEDED FOR WHEEZING 34 g 3    vitamin B-12 (CYANOCOBALAMIN) 1000 MCG tablet Take 1,000 mcg by mouth daily      hydrOXYzine (VISTARIL) 50 MG capsule Take 1 capsule by mouth 4 times daily as needed for Anxiety 120 capsule 0    traZODone (DESYREL) 150 MG tablet Take 150 mg by mouth every evening      FLUoxetine (PROZAC) 20 MG capsule Take 3 capsules by mouth daily 45 capsule 0    Cholecalciferol (VITAMIN D3) 50 MCG (2000 UT) CAPS Take 4,000 Units by mouth daily       QUEtiapine (SEROQUEL) 400 MG tablet Take 800 mg by mouth nightly      zinc 50 MG CAPS Take by mouth daily       Melatonin 10 MG TABS Take 20 mg by mouth nightly       Physical Examination       Well-appearing. 1.  Left side of the abdomen with a 3 to 4 cm tender erythematous nodule. 2.  Perineal region with 2 draining erythematous nodules. Assessment and Plan     1. Abscess of skin of abdomen due to an inflamed epidermoid cyst, left side of the abdomen    The skin overlying the abscess was cleansed with alcohol. Local anesthesia was achieved with 1% lidocaine with epinephrine. An incision was performed with a #11 blade. The abscess was drained of pus and keratin. The wound was then bandaged. Blood loss was minimal.  The patient left the office in good condition. 2. Hidradenitis suppurativa -2 small nodules    Discussed oral minocycline therapy. Patient declined.            --Jovan Maldonado MD

## 2022-10-04 ENCOUNTER — TELEPHONE (OUTPATIENT)
Dept: DERMATOLOGY | Age: 77
End: 2022-10-04

## 2022-10-04 NOTE — TELEPHONE ENCOUNTER
[phone]. Pt said Dr. Arsh Lindsey removed an abscess last week. Pt was at PCP today and PCP informed pt that the abscess site is infected. Pt unable to make the 7:30am time tomorrow. Pt needs at least 4 hours appt time in advance to schedule transportation to the office. I informed pt you would return call with the soonest available appt time. Please advise. Thank you! Refill send to pharmacy

## 2022-10-04 NOTE — TELEPHONE ENCOUNTER
Called and spoke to patient and he stated that the cyst has filled back up and he needs it drained. Patient scheduled for 10/7/22 at 10am.  Will watch for a sooner cancellation.

## 2022-10-06 ENCOUNTER — OFFICE VISIT (OUTPATIENT)
Dept: DERMATOLOGY | Age: 77
End: 2022-10-06
Payer: MEDICARE

## 2022-10-06 ENCOUNTER — TELEPHONE (OUTPATIENT)
Dept: DERMATOLOGY | Age: 77
End: 2022-10-06

## 2022-10-06 DIAGNOSIS — L72.3 INFLAMED EPIDERMOID CYST OF SKIN: Primary | ICD-10-CM

## 2022-10-06 DIAGNOSIS — K13.0 ANGULAR CHEILITIS: ICD-10-CM

## 2022-10-06 PROCEDURE — 10060 I&D ABSCESS SIMPLE/SINGLE: CPT | Performed by: DERMATOLOGY

## 2022-10-06 PROCEDURE — 99212 OFFICE O/P EST SF 10 MIN: CPT | Performed by: DERMATOLOGY

## 2022-10-06 PROCEDURE — 1123F ACP DISCUSS/DSCN MKR DOCD: CPT | Performed by: DERMATOLOGY

## 2022-10-06 RX ORDER — NYSTATIN AND TRIAMCINOLONE ACETONIDE 100000; 1 [USP'U]/G; MG/G
OINTMENT TOPICAL
Qty: 15 G | Refills: 1 | Status: SHIPPED | OUTPATIENT
Start: 2022-10-06

## 2022-10-06 NOTE — TELEPHONE ENCOUNTER
Pt is calling. He says he tried to  the Nystatin-triamcinolone ointment at [facility]. He received notice that his insurance will not cover the medication. Please call pt at [phone].

## 2022-10-06 NOTE — PROGRESS NOTES
[facility]  Enoch Lal MD  [phone]      [facility]  [birth year]    65 y.o. male     Date of Visit: 10/6/2022    Chief Complaint: cyst    History of Present Illness:    1. Here today for persistent inflamed cyst on the left flank. It has improved with incision and drainage performed on 9/22/2022 but not completely resolved. He still complains of some tenderness and drainage from the site. 2.  He also complains of chronic redness along the melolabial folds. Review of Systems:  Gen: Feels well, good sense of health. Past Medical History, Family History, Surgical History, Medications and Allergies reviewed. Past Medical History:   Diagnosis Date    Allergic rhintis 1959    Seasonal Hay Fever    Anxiety     Aortic stenosis     echo shows mod stenosis    Asthma     mixed w copd    Atrial fibrillation (HCC)     takes warfarin due to cost, cardioversion 8/4/11, 7/1/11, 2/16/11, 9/23/10    Atrial flutter ([address]     status post ablation-jack hare    Azoospermia     dx'd in teens    Bipolar 1 disorder ([address]     Chronic insomnia     Colon polyps     on colonoscopy    COPD (chronic obstructive pulmonary disease) (HCC)     Coronary artery calcification seen on CAT scan     Depression     [facility]    Essential tremor     dr Liu Stands    GERD (gastroesophageal reflux disease)     weaned off lansoprazole    Hiatal hernia     small, ugi-esophageal dysmotility    Hidradenitis suppurativa     perirectal    history of alcoholism     sober since 2008    Hyperlipidemia     Hypertension     Insomnia     Microcytic anemia 10/14/2010    iron infusions [facility], now resolved, due to malabsorption-dr mix    Obstructive sleep apnea     on bipap 17/11resolved 3 years ago    Osteoporosis 09/2021    dexa    Prostate nodule     dr Richard Shaw, urology    Seizures ([address]     1973-One occurance. Unknown etiology. On Dilantin 1 year.     Tobacco abuse     annual chest ct in may    Tremor primidone [facility]     Past Surgical History:   Procedure Laterality Date    ABDOMEN SURGERY      gastric band    [facility] [facility]  08/05/2011    atrial flutter ablation    ATRIAL ABLATION SURGERY  09/01/2011    cryoablation for atrial fibrillation    ATRIAL ABLATION SURGERY  09/03/2015    RFCA for AF with PVI    BARIATRIC SURGERY  08/05/2013    band over bypass    CARDIOVERSION  x4    CATARACT REMOVAL Bilateral 2009    R [address], LAPAROSCOPIC N/A 06/20/2016    LAPAROSCOPIC CHOLECYSTECTOMY WITH CHOLANGIOGRAM    COLONOSCOPY  10/01/2009    Keegan, repeat 3 years    COLONOSCOPY N/A 07/31/2020    COLONOSCOPY POLYPECTOMY SNARE/COLD BIOPSY performed by Charlie Wise MD at [facility] N/A 07/14/2021    fu 3 yrs, COLONOSCOPY POLYPECTOMY SNARE/COLD BIOPSY performed by Charlie Wise MD at [address]      from chest    HEMORRHOID SURGERY  03/23/2016    HERNIA REPAIR      Repaired in conjunction with Band Over Bypass, 2013. OTHER SURGICAL HISTORY  09/21/2015    excision vivian rectal cyst    PROSTATE BIOPSY  04/2021    neg    DIMITRIS-EN-Y GASTRIC BYPASS  2001    [place], [place]    UPPER GASTROINTESTINAL ENDOSCOPY  05/13/2013    [place]    UPPER GASTROINTESTINAL ENDOSCOPY N/A 07/31/2020    EGD BIOPSY performed by Charlie Wise MD at [address] [place] [address] N/A 10/02/2020    EGD DIAGNOSTIC ONLY performed by Charlie Wise MD at [address] [place] [address]  04/2021    UPPER GASTROINTESTINAL ENDOSCOPY  07/28/2021       No Known Allergies  Outpatient Medications Marked as Taking for the 10/6/22 encounter (Office Visit) with Lesia Phalen, MD   Medication Sig Dispense Refill    nystatin-triamcinolone (MYCOLOG) 961415-1.0 UNIT/GM-% ointment Apply topically 2 times daily for 2 weeks or until improved.  15 g 1 acetaminophen (AMINOFEN) 325 MG tablet Take 2 tablets by mouth every 6 hours as needed for Pain 20 tablet 0    atorvastatin (LIPITOR) 40 MG tablet Take 1 tablet by mouth daily 100 tablet 1    alendronate (FOSAMAX) 70 MG tablet Take 1 tablet by mouth every 7 days 12 tablet 3    fluticasone-salmeterol (ADVAIR DISKUS) 250-50 MCG/ACT AEPB diskus inhaler Inhale 1 puff into the lungs every 12 hours 1 each 3    montelukast (SINGULAIR) 10 MG tablet TAKE 1 TABLET BY MOUTH AT  NIGHT 90 tablet 1    apixaban (ELIQUIS) 5 MG TABS tablet Take 5 mg by mouth 2 times daily      albuterol sulfate  (90 Base) MCG/ACT inhaler USE 2 INHALATIONS BY MOUTH  EVERY 6 HOURS AS NEEDED FOR WHEEZING 34 g 3    vitamin B-12 (CYANOCOBALAMIN) 1000 MCG tablet Take 1,000 mcg by mouth daily      hydrOXYzine (VISTARIL) 50 MG capsule Take 1 capsule by mouth 4 times daily as needed for Anxiety 120 capsule 0    traZODone (DESYREL) 150 MG tablet Take 150 mg by mouth every evening      FLUoxetine (PROZAC) 20 MG capsule Take 3 capsules by mouth daily 45 capsule 0    Cholecalciferol (VITAMIN D3) 50 MCG (2000 UT) CAPS Take 4,000 Units by mouth daily       QUEtiapine (SEROQUEL) 400 MG tablet Take 800 mg by mouth nightly      zinc 50 MG CAPS Take by mouth daily       Melatonin 10 MG TABS Take 20 mg by mouth nightly         Physical Examination       Well appearing. 1.  Left flank -approximately 2 cm oval-shaped reddish-brown nodule with overlying punctum. 2.  Melolabial folds with macerated erythematous patches. Assessment and Plan     1. Inflamed epidermoid cyst of skin -     The skin overlying the cyst was cleansed with alcohol. Local anesthesia was achieved with 1% lidocaine with epinephrine. An incision was performed with a #11 blade. A small amount of pus and cyst contents were expressed. Blood loss was minimal and there were no complications.      2. Angular cheilitis -    Mycolog ointment twice daily until improved and then as needed

## 2023-01-08 ENCOUNTER — HOSPITAL ENCOUNTER (EMERGENCY)
Age: 78
Discharge: HOME | End: 2023-01-08
Attending: EMERGENCY MEDICINE
Payer: MEDICARE

## 2023-01-08 ENCOUNTER — APPOINTMENT (OUTPATIENT)
Dept: CT IMAGING | Age: 78
End: 2023-01-08
Payer: MEDICARE

## 2023-01-08 VITALS
WEIGHT: 179.9 LBS | DIASTOLIC BLOOD PRESSURE: 52 MMHG | OXYGEN SATURATION: 93 % | BODY MASS INDEX: 27.35 KG/M2 | TEMPERATURE: 98 F | SYSTOLIC BLOOD PRESSURE: 106 MMHG | RESPIRATION RATE: 18 BRPM | HEART RATE: 63 BPM

## 2023-01-08 DIAGNOSIS — K59.00 CONSTIPATION, UNSPECIFIED CONSTIPATION TYPE: Primary | ICD-10-CM

## 2023-01-08 LAB
ALBUMIN SERPL-MCNC: 3.5 G/DL (ref 3.4–5)
ALP BLD-CCNC: 104 U/L (ref 40–129)
ALT SERPL-CCNC: 45 U/L (ref 10–40)
ANION GAP SERPL CALCULATED.3IONS-SCNC: 11 MMOL/L (ref 3–16)
AST SERPL-CCNC: 52 U/L (ref 15–37)
BASOPHILS ABSOLUTE: 0 K/UL (ref 0–0.2)
BASOPHILS RELATIVE PERCENT: 0.4 %
BILIRUB SERPL-MCNC: <0.2 MG/DL (ref 0–1)
BILIRUBIN DIRECT: <0.2 MG/DL (ref 0–0.3)
BILIRUBIN, INDIRECT: ABNORMAL MG/DL (ref 0–1)
BUN BLDV-MCNC: 17 MG/DL (ref 7–20)
CALCIUM SERPL-MCNC: 8.6 MG/DL (ref 8.3–10.6)
CHLORIDE BLD-SCNC: 102 MMOL/L (ref 99–110)
CO2: 24 MMOL/L (ref 21–32)
CREAT SERPL-MCNC: 0.6 MG/DL (ref 0.8–1.3)
EOSINOPHILS ABSOLUTE: 0.2 K/UL (ref 0–0.6)
EOSINOPHILS RELATIVE PERCENT: 2.9 %
GFR SERPL CREATININE-BSD FRML MDRD: >60 ML/MIN/{1.73_M2}
GLUCOSE BLD-MCNC: 114 MG/DL (ref 70–99)
HCT VFR BLD CALC: 44.1 % (ref 40.5–52.5)
HEMOGLOBIN: 14.4 G/DL (ref 13.5–17.5)
LIPASE: 22 U/L (ref 13–60)
LYMPHOCYTES ABSOLUTE: 0.9 K/UL (ref 1–5.1)
LYMPHOCYTES RELATIVE PERCENT: 12.2 %
MCH RBC QN AUTO: 29.2 PG (ref 26–34)
MCHC RBC AUTO-ENTMCNC: 32.7 G/DL (ref 31–36)
MCV RBC AUTO: 89.1 FL (ref 80–100)
MONOCYTES ABSOLUTE: 0.1 K/UL (ref 0–1.3)
MONOCYTES RELATIVE PERCENT: 1.5 %
NEUTROPHILS ABSOLUTE: 6.1 K/UL (ref 1.7–7.7)
NEUTROPHILS RELATIVE PERCENT: 83 %
PDW BLD-RTO: 17.5 % (ref 12.4–15.4)
PLATELET # BLD: 308 K/UL (ref 135–450)
PMV BLD AUTO: 8 FL (ref 5–10.5)
POTASSIUM REFLEX MAGNESIUM: 4.9 MMOL/L (ref 3.5–5.1)
RBC # BLD: 4.95 M/UL (ref 4.2–5.9)
SODIUM BLD-SCNC: 137 MMOL/L (ref 136–145)
TOTAL PROTEIN: 6.4 G/DL (ref 6.4–8.2)
WBC # BLD: 7.3 K/UL (ref 4–11)

## 2023-01-08 PROCEDURE — 80048 BASIC METABOLIC PNL TOTAL CA: CPT

## 2023-01-08 PROCEDURE — 85025 COMPLETE CBC W/AUTO DIFF WBC: CPT

## 2023-01-08 PROCEDURE — 2580000003 HC RX 258: Performed by: EMERGENCY MEDICINE

## 2023-01-08 PROCEDURE — 6370000000 HC RX 637 (ALT 250 FOR IP): Performed by: EMERGENCY MEDICINE

## 2023-01-08 PROCEDURE — 96374 THER/PROPH/DIAG INJ IV PUSH: CPT

## 2023-01-08 PROCEDURE — 99285 EMERGENCY DEPT VISIT HI MDM: CPT

## 2023-01-08 PROCEDURE — 6360000004 HC RX CONTRAST MEDICATION: Performed by: EMERGENCY MEDICINE

## 2023-01-08 PROCEDURE — 74177 CT ABD & PELVIS W/CONTRAST: CPT

## 2023-01-08 PROCEDURE — 6360000002 HC RX W HCPCS: Performed by: EMERGENCY MEDICINE

## 2023-01-08 PROCEDURE — 80076 HEPATIC FUNCTION PANEL: CPT

## 2023-01-08 PROCEDURE — 83690 ASSAY OF LIPASE: CPT

## 2023-01-08 RX ORDER — SENNA AND DOCUSATE SODIUM 50; 8.6 MG/1; MG/1
1 TABLET, FILM COATED ORAL 2 TIMES DAILY PRN
Qty: 15 TABLET | Refills: 0 | Status: SHIPPED | OUTPATIENT
Start: 2023-01-08

## 2023-01-08 RX ORDER — 0.9 % SODIUM CHLORIDE 0.9 %
1000 INTRAVENOUS SOLUTION INTRAVENOUS ONCE
Status: COMPLETED | OUTPATIENT
Start: 2023-01-08 | End: 2023-01-08

## 2023-01-08 RX ORDER — ONDANSETRON 2 MG/ML
4 INJECTION INTRAMUSCULAR; INTRAVENOUS ONCE
Status: COMPLETED | OUTPATIENT
Start: 2023-01-08 | End: 2023-01-08

## 2023-01-08 RX ORDER — SENNA AND DOCUSATE SODIUM 50; 8.6 MG/1; MG/1
2 TABLET, FILM COATED ORAL ONCE
Status: COMPLETED | OUTPATIENT
Start: 2023-01-08 | End: 2023-01-08

## 2023-01-08 RX ADMIN — IOPAMIDOL 75 ML: 755 INJECTION, SOLUTION INTRAVENOUS at 03:18

## 2023-01-08 RX ADMIN — DOCUSATE SODIUM 50 MG AND SENNOSIDES 8.6 MG 2 TABLET: 8.6; 5 TABLET, FILM COATED ORAL at 04:04

## 2023-01-08 RX ADMIN — ONDANSETRON 4 MG: 2 INJECTION INTRAMUSCULAR; INTRAVENOUS at 02:18

## 2023-01-08 RX ADMIN — SODIUM CHLORIDE 1000 ML: 9 INJECTION, SOLUTION INTRAVENOUS at 02:18

## 2023-01-08 ASSESSMENT — ENCOUNTER SYMPTOMS
BACK PAIN: 0
STRIDOR: 0
BLOOD IN STOOL: 0
ANAL BLEEDING: 0
EYE PAIN: 0
APNEA: 0
EYE ITCHING: 0
PHOTOPHOBIA: 0
CHOKING: 0
WHEEZING: 0
COLOR CHANGE: 0
EYE DISCHARGE: 0
EYE REDNESS: 0
ABDOMINAL PAIN: 1
COUGH: 0
SHORTNESS OF BREATH: 0
CONSTIPATION: 1
ABDOMINAL DISTENTION: 0
CHEST TIGHTNESS: 0

## 2023-01-08 NOTE — ED NOTES
Provider order placed for patient's discharge. Provider reviewed decision to discharge with the patient. Discharge paperwork and any prescriptions were reviewed with the patient. Patient verbalized understanding of discharge education and any prescriptions and has no further questions or further needs at this time. Patient left with all personal belongings and was stable upon departure. Patient thanked for choosing [facility] ([facility]) and informed to return should any need arise.        Martina Pacheco RN  01/08/23 5500

## 2023-01-08 NOTE — ED TRIAGE NOTES
[facility]. Patient states he was just seen by his doctor Friday and \"everything checked out\"  but is scheduled for an endoscopy and colonoscopy in the near future. Patient states typically he will get this pain in his RLQ, but it goes away. Patient states after he ate tonight the pain would not go away. Patient c/o 6/10 pain, took tylenol but not effective. Patient denies N/V, but states diarrhea.

## 2023-01-09 ENCOUNTER — CARE COORDINATION (OUTPATIENT)
Dept: CARE COORDINATION | Age: 78
End: 2023-01-09

## 2023-01-09 NOTE — CARE COORDINATION
Pt assigned to this ACM following ER visit .  Per chart review, pt transferred to [facility] [place] [facility] [place] provider on 8/22, DR Aggie Diaz    No outreach planned

## 2023-01-27 NOTE — PROGRESS NOTES
Madelyn is a 89 y.o. female admitted on 1/26/2023 with Word finding difficulty [R47.89]. Principal problem is Word finding difficulty.    Past Medical History  Madelyn has a past medical history of Allergic bronchopulmonary aspergillosis (CMS/[facility]) (04/02/2018), Allergic rhinitis (04/02/2018), Calcification of aorta (CMS/[facility]) (06/29/2020), Chronic obstructive pulmonary disease (CMS/[facility]) (04/02/2018), Essential hypertension (04/02/2018), GERD (gastroesophageal reflux disease) (06/08/2019), Insomnia (04/02/2018), Lumbar spinal stenosis (04/02/2018), Macular degeneration disease, Obstructive sleep apnea syndrome (04/02/2018), Osteoporosis (04/02/2018), Pulmonary embolism (CMS/[facility]) (06/09/2019), and Vertebral compression fracture (CMS/[facility]).    History of Present Illness   Per H&P, patient states that she was doing well until Monday evening.  She had an appointment Monday morning with her lung doctor and she was well and her blood pressures were stable at that time.  Monday night she started feeling unwell.  She started with altered taste and then had a bad sensation and feeling in her body along with shaking of the whole body.  Tuesday morning when she woke up she continued to feel unwell and she also noticed that she has had difficulty trying to remember to find the words that were necessary to communicate at times.     She slept during Tuesday morning as well and did not feel well otherwise.  Wednesday she felt a little energetic but today she continued to feel worse and hence asked her daughter to bring her here to the hospital for further evaluation.    CT and MRI negative.    Mr. Guero Westbrook is a 67 y.o. y/o male with history of Afib   He presents today for anticoagulation monitoring and adjustment. Pertinent PMH: COPD, CHF, HTN  Patient has his lap band adjusted occasioanly which will continue to affect his diet. Patient Reported Findings:  Yes     No  [x]   []       Patient verifies current dosing regimen as listed- patient cuts out calendar and puts it on top of his pill box and follows day by day- says he follows the AVS   []   [x]       S/S bleeding/bruising/swelling/SOB- denies   []   [x]       Blood in urine or stool- denies   [x]   []       Procedures scheduled in the future at this time upcoming procedure doesn't have date. Needs to stop warfarin 7 days prior and 3 days after, per pt ok from Dr. Ary Medrano already    []   [x]       Missed dose denies    []   [x]       Extra dose- denies   [x]   []       Change in medications -was on pain meds, done now--> more tylenol recently --> is having iron infusions--> increase vistaril 50mg TID --> has been taking 1500 mg 1-2 times daily for headaches   [x]   []       Change in health/diet/appetite He has had  green beans 2 x last week and has had broccoli 4 times a week which is an increase. He will also have mixed vegetables depending on other food available on the menu. (tani slaw, green beans, corn). He tries to keep Vit K intake consistent, but sometimes has trouble. --->has had less greens than normal ---> no vit k--> no change, no NVD--> diarrhea for about a week --> no vit k, still having diarrhea (believes it is from vaccine from 2 weeks ago)  []   [x]       Change in alcohol use- denies   []   [x]       Change in activity  []   [x]       Hospital admission  []   [x]       Emergency department visit  []   [x]       Other complaints   []   [x]       Tobacco use  Stopped smoking as of Wed 11/28/19. Former smoked 2 1/2 ppd.     Clinical Outcomes:  Yes     No  []   [x]       Major bleeding event  []   [x] Thromboembolic event  Uses a pillbox  Duration of warfarin Therapy: indefinitely  INR Range:  2.0-3.0     INR 5.7 today after large amounts of tylenol and diarrhea  Hold dose tonight and tomorrow then decrease weekly dose to 5 mg on Sun and Thurs and 7.5 mg all other days of the week (9% dec)  Encouraged patient to remain consistent with vit k intake.   Refilled warfarin at op pharmacy   Recheck INR in 2 weeks, 3/29    **Patient does NOT want a yellow highlighter**     Referring cardiologist: Dr. Svetlana Wolff  INR (no units)   Date Value   02/23/2021 3.3   02/04/2021 2.8   01/04/2021 2   12/03/2020 2.1   10/02/2020 1.03   07/31/2020 1.05   11/27/2019 5.16 ([facility])   09/10/2018 1.7     CLINICAL PHARMACY CONSULT: MED RECONCILIATION/REVIEW ADDENDUM    For Pharmacy Admin Tracking Only    PHSO: No  Total # of Interventions Recommended: 2  - Decreased Dose #: 1  - Refills Provided #: 1  - Maintenance Safety Lab Monitoring #: 1  Total Interventions Accepted: 2  Time Spent (min): 15    Abraham Guevara PharmD

## 2023-02-07 NOTE — ED PROVIDER NOTES
I PERSONALLY SAW THE PATIENT AND PERFORMED A SUBSTANTIVE PORTION OF THE VISIT INCLUDING ALL ASPECTS OF THE MEDICAL DECISION MAKING PROCESS. [address]      Pt Name: William Lorenzo  MRN: [id number]  Frankgfdarrian [birth year]  Date of evaluation: 1/8/2023  Provider: Jeramy Shepherd MD    CHIEF COMPLAINT       Chief Complaint   Patient presents with    Abdominal Pain       HISTORY OF PRESENT ILLNESS    William Lorenzo is a 65 y.o. male who presents to the emergency department with constipation abdominal pain. Patient presents with difficulty going to the bathroom the last few days and lower quadrant abdominal pain. Crampy in nature. Passing gas without any issues. No chest pain or shortness of breath. Symptoms started spontaneously. Constant in nature. No other associated symptoms. Nursing Notes were reviewed. Including nursing noted for FM, Surgical History, Past Medical History, Social History, vitals, and allergies; agree with all. REVIEW OF SYSTEMS       Review of Systems   Constitutional:  Negative for activity change, appetite change, chills, diaphoresis, fatigue, fever and unexpected weight change. HENT:  Negative for congestion, dental problem, drooling, ear discharge and ear pain. Eyes:  Negative for photophobia, pain, discharge, redness, itching and visual disturbance. Respiratory:  Negative for apnea, cough, choking, chest tightness, shortness of breath, wheezing and stridor. Cardiovascular:  Negative for chest pain, palpitations and leg swelling. Gastrointestinal:  Positive for abdominal pain and constipation. Negative for abdominal distention, anal bleeding and blood in stool. Endocrine: Negative for cold intolerance and heat intolerance. Genitourinary:  Negative for decreased urine volume and urgency. Musculoskeletal:  Negative for arthralgias and back pain.    Skin:  Negative for color change and pallor. Neurological:  Negative for facial asymmetry and weakness. Hematological:  Negative for adenopathy. Does not bruise/bleed easily. Psychiatric/Behavioral:  Negative for agitation, behavioral problems, confusion and decreased concentration. Except as noted above the remainder of the review of systems was reviewed and negative. PAST MEDICAL HISTORY     Past Medical History:   Diagnosis Date    Allergic rhintis 1959    Seasonal Hay Fever    Anxiety     Aortic stenosis     echo shows mod stenosis    Asthma     mixed w copd    Atrial fibrillation (HCC)     takes warfarin due to cost, cardioversion 8/4/11, 7/1/11, 2/16/11, 9/23/10    Atrial flutter ([address]     status post ablation-jack hare    Azoospermia     dx'd in teens    Bipolar 1 disorder ([facility]     Chronic insomnia     Colon polyps     on colonoscopy    COPD (chronic obstructive pulmonary disease) (HCC)     Coronary artery calcification seen on CAT scan     Depression     [facility]    Essential tremor     dr Srinivas Hammer    GERD (gastroesophageal reflux disease)     weaned off lansoprazole    Hiatal hernia     small, ugi-esophageal dysmotility    Hidradenitis suppurativa     perirectal    history of alcoholism     sober since 2008    Hyperlipidemia     Hypertension     Insomnia     Microcytic anemia 10/14/2010    iron infusions [facility], now resolved, due to malabsorption-dr mix    Obstructive sleep apnea     on bipap 17/11resolved 3 years ago    Osteoporosis 09/2021    dexa    Prostate nodule     dr Agus Zambrano, urology    Seizures ([facility]     1973-One occurance. Unknown etiology. On Dilantin 1 year.     Tobacco abuse     annual chest ct in may    Tremor     primidone as per  neurology       SURGICAL HISTORY       Past Surgical History:   Procedure Laterality Date    ABDOMEN SURGERY      gastric band    APPENDECTOMY  2001    [place]    ATRIAL ABLATION SURGERY  08/05/2011    atrial flutter ablation    ATRIAL ABLATION SURGERY  09/01/2011 cryoablation for atrial fibrillation    ATRIAL ABLATION SURGERY  09/03/2015    RFCA for AF with PVI    BARIATRIC SURGERY  08/05/2013    band over bypass    CARDIOVERSION  x4    CATARACT REMOVAL Bilateral 2009    [facility]    CHOLECYSTECTOMY, LAPAROSCOPIC N/A 06/20/2016    LAPAROSCOPIC CHOLECYSTECTOMY WITH CHOLANGIOGRAM    COLONOSCOPY  10/01/2009    [place], repeat 3 years    COLONOSCOPY N/A 07/31/2020    COLONOSCOPY POLYPECTOMY SNARE/COLD BIOPSY performed by Domenico Allison MD at [facility] ENDOSCOPY    COLONOSCOPY N/A 07/14/2021    fu 3 yrs, COLONOSCOPY POLYPECTOMY SNARE/COLD BIOPSY performed by Domenico Allison MD at [facility] ENDOSCOPY    CYST REMOVAL      from chest    HEMORRHOID SURGERY  03/23/2016    HERNIA REPAIR      Repaired in conjunction with Band Over Bypass, 2013.    OTHER SURGICAL HISTORY  09/21/2015    excision vivian rectal cyst    PROSTATE BIOPSY  04/2021    neg    DIMITRIS-EN-Y GASTRIC BYPASS  2001    [place], [place]    UPPER GASTROINTESTINAL ENDOSCOPY  05/13/2013    [facility]    UPPER GASTROINTESTINAL ENDOSCOPY N/A 07/31/2020    EGD BIOPSY performed by Domenico Allison MD at [facility] ENDOSCOPY    UPPER GASTROINTESTINAL ENDOSCOPY N/A 10/02/2020    EGD DIAGNOSTIC ONLY performed by Domenico Allison MD at [facility] ENDOSCOPY    UPPER GASTROINTESTINAL ENDOSCOPY  04/2021    UPPER GASTROINTESTINAL ENDOSCOPY  07/28/2021       CURRENT MEDICATIONS       Discharge Medication List as of 1/8/2023  3:56 AM        CONTINUE these medications which have NOT CHANGED    Details   nystatin-triamcinolone (MYCOLOG) 380098-1.1 UNIT/GM-% ointment Apply topically 2 times daily for 2 weeks or until improved., Disp-15 g, R-1, Normal      acetaminophen (AMINOFEN) 325 MG tablet Take 2 tablets by mouth every 6 hours as needed for Pain, Disp-20 tablet, R-0Print      atorvastatin (LIPITOR) 40 MG tablet Take 1 tablet by mouth daily, Disp-100 tablet, R-1Requesting 1 year supplyNormal      alendronate  (FOSAMAX) 70 MG tablet Take 1 tablet by mouth every 7 days, Disp-12 tablet, R-3Requesting 1 year supplyNormal      fluticasone-salmeterol (ADVAIR DISKUS) 250-50 MCG/ACT AEPB diskus inhaler Inhale 1 puff into the lungs every 12 hours, Disp-1 each, R-3Normal      montelukast (SINGULAIR) 10 MG tablet TAKE 1 TABLET BY MOUTH AT  NIGHT, Disp-90 tablet, R-1Requesting 1 year supplyNormal      apixaban (ELIQUIS) 5 MG TABS tablet Take 5 mg by mouth 2 times dailyHistorical Med      albuterol sulfate  (90 Base) MCG/ACT inhaler USE 2 INHALATIONS BY MOUTH  EVERY 6 HOURS AS NEEDED FOR WHEEZING, Disp-34 g, R-3Requesting 1 year supplyNormal      vitamin B-12 (CYANOCOBALAMIN) 1000 MCG tablet Take 1,000 mcg by mouth dailyHistorical Med      hydrOXYzine (VISTARIL) 50 MG capsule Take 1 capsule by mouth 4 times daily as needed for Anxiety, Disp-120 capsule, R-0Historical Med      traZODone (DESYREL) 150 MG tablet Take 150 mg by mouth every eveningHistorical Med      FLUoxetine (PROZAC) 20 MG capsule Take 3 capsules by mouth daily, Disp-45 capsule, R-0Adjust Sig      Cholecalciferol (VITAMIN D3) 50 MCG (2000 UT) CAPS Take 4,000 Units by mouth daily Historical Med      QUEtiapine (SEROQUEL) 400 MG tablet Take 800 mg by mouth nightlyHistorical Med      zinc 50 MG CAPS Take by mouth daily Historical Med      Melatonin 10 MG TABS Take 20 mg by mouth nightly             ALLERGIES     Patient has no known allergies. FAMILY HISTORY        Family History   Problem Relation Age of Onset    Migraines Mother     Emphysema Mother     Depression Mother         ECT; meds.     Heart Disease Mother         Mitral Valve Replacement    Heart Disease Father     Hypertension Father     Alcohol Abuse Father     High Blood Pressure Father     High Cholesterol Father         Treated with meds    Stroke Father         AHD    Substance Abuse Father         ETOH Abuse       SOCIAL HISTORY       Social History     Socioeconomic History    Marital status: Single     Spouse name: None    Number of children: 0    Years of education: None    Highest education level: None   Occupational History    Occupation: retired   Tobacco Use    Smoking status: Every Day     Packs/day: 3.00     Years: 50.00     Pack years: 150.00     Types: Cigarettes     Start date: 1959     Last attempt to quit: 2009     Years since quittin.4    Smokeless tobacco: Never    Tobacco comments:     3 ppd on average, more so during covid   Vaping Use    Vaping Use: Never used   Substance and Sexual Activity    Alcohol use: Not Currently    Drug use: No    Sexual activity: Never   Social History Narrative    Lives alone, senior living complex in [address] [place] [address]    Originally from [facility]    Has , doesn't drive     Social Determinants of Health     Financial Resource Strain: Medium Risk    Difficulty of Paying Living Expenses: Somewhat hard   Food Insecurity: No Food Insecurity    Worried About [address] in the Last Year: Never true    [employer] in the Last Year: Never true   Physical Activity: Insufficiently Active    Days of Exercise per Week: 2 days    Minutes of Exercise per Session: 20 min   Intimate Partner Violence: Not At Risk    Fear of Current or Ex-Partner: No    Emotionally Abused: No    Physically Abused: No    Sexually Abused: No       PHYSICAL EXAM       ED Triage Vitals [23 0200]   BP Temp Temp Source Heart Rate Resp SpO2 Height Weight   101/72 98 °F (36.7 °C) Oral 63 18 94 % -- 179 lb 14.3 oz (81.6 kg)       Physical Exam  Vitals and nursing note reviewed. Constitutional:       General: He is not in acute distress. Appearance: He is well-developed. He is not diaphoretic. HENT:      Head: Normocephalic and atraumatic. Eyes:      General:         Right eye: No discharge. Left eye: No discharge. Pupils: Pupils are equal, round, and reactive to light. Neck:      Thyroid: No thyromegaly.       Trachea: No tracheal deviation. Cardiovascular:      Rate and Rhythm: Normal rate and regular rhythm. Heart sounds: No murmur heard. Pulmonary:      Breath sounds: No wheezing or rales. Chest:      Chest wall: No tenderness. Abdominal:      General: There is no distension. Palpations: Abdomen is soft. There is no mass. Tenderness: There is no abdominal tenderness. There is no guarding or rebound. Musculoskeletal:         General: No tenderness or deformity. Normal range of motion. Cervical back: Normal range of motion. Skin:     General: Skin is warm. Coloration: Skin is not pale. Findings: No erythema or rash. Neurological:      Mental Status: He is alert. Cranial Nerves: No cranial nerve deficit. Motor: No abnormal muscle tone.       Coordination: Coordination normal.       DIAGNOSTIC RESULTS     EKG: All EKG's are interpreted by the Emergency Department Physician who either signs or Co-signs this chart in the absence of acardiologist.    Interpreted by myself     RADIOLOGY:   Non-plain film images such as CT, Ultrasoundand MRI are read by the radiologist. Plain radiographic images are visualized and preliminarily interpreted by the emergency physician with the below findings:    CT concerning for possible ileus    ED BEDSIDE ULTRASOUND:   Performed by ED Physician - none    LABS:  Labs Reviewed   CBC WITH AUTO DIFFERENTIAL - Abnormal; Notable for the following components:       Result Value    RDW 17.5 (*)     Lymphocytes Absolute 0.9 (*)     All other components within normal limits   BASIC METABOLIC PANEL W/ REFLEX TO MG FOR LOW K - Abnormal; Notable for the following components:    Glucose 114 (*)     Creatinine 0.6 (*)     All other components within normal limits   HEPATIC FUNCTION PANEL - Abnormal; Notable for the following components:    ALT 45 (*)     AST 52 (*)     All other components within normal limits   LIPASE       All other labs were withinnormal range or not returned as of this dictation. EMERGENCY DEPARTMENT COURSE and DIFFERENTIAL DIAGNOSIS/MDM:     PMH, Surgical Hx, FH, Social Hx reviewed by myself (ETOH usage, Tobacco usage, Drug usage reviewed by myself, no pertinent Hx)- No Pertinent Hx     Old records were reviewed by me     MDM 68-year-old with abdominal pain and constipation. Found to have ileus on CT. We will start Senokot. Patient endorses passing gas. No nausea or vomiting. Discussed strict return precautions. Pain well controlled. DDX-ileus  Social Determinants (Poverty, Education, uninsured) -none  Prior notes-prior notes reviewed  Name and route all medications-Senokot p.o. Charting interpretations all by myself-chart interpretation to myself shows ileus  Diagnosis descriptions-mild  Consults-PCP follow  Disposition- Considered -consider admission but patient's pain is well controlled tolerating p.o. passing gas would like to go home. Strict return precautions    I estimate there is LOW risk for Sepsis, MI, Stroke, Tamponade, PTX, Toxicity or other life threatening etiology thus I consider the discharge disposition reasonable. The patient is at low risk for mortality based on demographic, history and clinical factors. Given the best available information and clinical assessment, I estimate the risk of hospitalization to be greater than risk of treatment at home. I have explained to the patient that the risk could rapidly change, given precautions for return and instructions. Explained to patient that the risk for mortality is low based on demographic, history and clinical factors. I discussed with patient the results of evaluation in the ED, diagnosis, care, and prognosis. The plan is to discharge to home. Patient is in agreement with plan and questions have been answered. I also discussed with patient the reasons which may require a return visit and the importance of follow-up care.   The patient is well-appearing, nontoxic, and improved at the time of discharge. Patient agrees to call to arrange follow-up care as directed. Patient understands to return immediately for worsening/change in symptoms. I PERSONALLY SAW THE PATIENT AND PERFORMED A SUBSTANTIVE PORTION OF THE VISIT INCLUDING ALL ASPECTS OF THE MEDICAL DECISION MAKING PROCESS. The primary clinician of record [address]   Total Critical Caretime was 12 minutes, excluding separately reportable procedures. There was a high probability of clinically significant/life threatening deterioration in the patient's condition which required my urgent intervention. CRITICAL CARE  I personally saw the patient and independently provided 12 minutes of non-concurrent critical care out of the total shared critical care time provided. This excludes seperately billable procedures. Critical care time was provided for patient as above that required close evaluation and/or intervention with concern for potential patient decompensation. PROCEDURES:  Unlessotherwise noted below, none    FINAL IMPRESSION      1. Constipation, unspecified constipation type          DISPOSITION/PLAN   DISPOSITION Decision To Discharge 01/08/2023 03:53:36 AM    PATIENT REFERRED TO:  Patsy Jones MD  [address] [facility].   [address] [facility] [address]  [phone]            DISCHARGE MEDICATIONS:  Discharge Medication List as of 1/8/2023  3:56 AM        START taking these medications    Details   sennosides-docusate sodium (SENOKOT-S) 8.6-50 MG tablet Take 1 tablet by mouth 2 times daily as needed for Constipation, Disp-15 tablet, R-0Print                (Please note that portions ofthis note were completed with a voice recognition program.  Efforts were made to edit the dictations but occasionally words are mis-transcribed.)    Charmaine Rodriguez MD(electronically signed)  Attending Emergency Physician        Charmaine Rodriguez MD  01/08/23 0606 2 = A lot of assistance

## 2023-08-24 ENCOUNTER — OFFICE VISIT (OUTPATIENT)
Dept: DERMATOLOGY | Age: 78
End: 2023-08-24
Payer: MEDICARE

## 2023-08-24 DIAGNOSIS — L82.1 SK (SEBORRHEIC KERATOSIS): ICD-10-CM

## 2023-08-24 DIAGNOSIS — L57.0 AK (ACTINIC KERATOSIS): Primary | ICD-10-CM

## 2023-08-24 DIAGNOSIS — L73.2 HIDRADENITIS SUPPURATIVA: ICD-10-CM

## 2023-08-24 PROCEDURE — 17000 DESTRUCT PREMALG LESION: CPT | Performed by: DERMATOLOGY

## 2023-08-24 PROCEDURE — 1123F ACP DISCUSS/DSCN MKR DOCD: CPT | Performed by: DERMATOLOGY

## 2023-08-24 PROCEDURE — 99212 OFFICE O/P EST SF 10 MIN: CPT | Performed by: DERMATOLOGY

## 2023-08-24 PROCEDURE — 17003 DESTRUCT PREMALG LES 2-14: CPT | Performed by: DERMATOLOGY

## 2023-08-24 NOTE — PROGRESS NOTES
Take 2 tablets by mouth every 6 hours as needed for Pain 20 tablet 0    alendronate (FOSAMAX) 70 MG tablet Take 1 tablet by mouth every 7 days 12 tablet 3    fluticasone-salmeterol (ADVAIR DISKUS) 250-50 MCG/ACT AEPB diskus inhaler Inhale 1 puff into the lungs every 12 hours 1 each 3    montelukast (SINGULAIR) 10 MG tablet TAKE 1 TABLET BY MOUTH AT  NIGHT 90 tablet 1    apixaban (ELIQUIS) 5 MG TABS tablet Take 1 tablet by mouth 2 times daily      albuterol sulfate  (90 Base) MCG/ACT inhaler USE 2 INHALATIONS BY MOUTH  EVERY 6 HOURS AS NEEDED FOR WHEEZING 34 g 3    vitamin B-12 (CYANOCOBALAMIN) 1000 MCG tablet Take 1 tablet by mouth daily      hydrOXYzine (VISTARIL) 50 MG capsule Take 1 capsule by mouth 4 times daily as needed for Anxiety 120 capsule 0    traZODone (DESYREL) 150 MG tablet Take 1 tablet by mouth every evening      FLUoxetine (PROZAC) 20 MG capsule Take 3 capsules by mouth daily 45 capsule 0    Cholecalciferol (VITAMIN D3) 50 MCG (2000 UT) CAPS Take 2 capsules by mouth daily      QUEtiapine (SEROQUEL) 400 MG tablet Take 2 tablets by mouth nightly      zinc 50 MG CAPS Take by mouth daily       Melatonin 10 MG TABS Take 20 mg by mouth nightly             Physical Examination       The following were examined and determined to be normal: Psych/Neuro, Head/face, Conjunctivae/eyelids, Gums/teeth/lips, Neck, Breast/axilla/chest, Abdomen, Back, RUE, LUE, RLE, LLE, and Nails/digits. The following were examined and determined to be abnormal: Scalp/hair. Well-appearing. 1.  Vertex scalp with 2 small keratotic macules and 1 hyperkeratotic pink papule. 2.  Trunk and extremities with multiple stuck on appearing verrucous brown papules and plaques. 3.  Right inguinal region with scarring and a nondraining sinus tract. Assessment and Plan     1. AK (actinic keratosis) - 3    Cryotherapy was discussed and patient agreed to proceed. Consent was obtained.   3 lesions were treated

## 2023-12-03 ENCOUNTER — APPOINTMENT (OUTPATIENT)
Dept: CT IMAGING | Age: 78
DRG: 185 | End: 2023-12-03
Payer: MEDICARE

## 2023-12-03 ENCOUNTER — HOSPITAL ENCOUNTER (INPATIENT)
Age: 78
LOS: 1 days | Discharge: HOME HEALTH | DRG: 185 | End: 2023-12-05
Attending: INTERNAL MEDICINE | Admitting: INTERNAL MEDICINE
Payer: MEDICARE
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
## 2023-12-03 DIAGNOSIS — S22.41XA CLOSED FRACTURE OF MULTIPLE RIBS OF RIGHT SIDE, INITIAL ENCOUNTER: ICD-10-CM

## 2023-12-03 LAB
ALBUMIN SERPL-MCNC: 3.6 G/DL (ref 3.4–5)
ALBUMIN/GLOB SERPL: 1 {RATIO} (ref 1.1–2.2)
ALP SERPL-CCNC: 195 U/L (ref 40–129)
ALT SERPL-CCNC: 68 U/L (ref 10–40)
ANION GAP SERPL CALCULATED.3IONS-SCNC: 11 MMOL/L (ref 3–16)
APTT BLD: 33.6 SEC (ref 22.7–35.9)
AST SERPL-CCNC: 69 U/L (ref 15–37)
BASOPHILS # BLD: 0 K/UL (ref 0–0.2)
BASOPHILS NFR BLD: 0.4 %
BILIRUB SERPL-MCNC: 0.6 MG/DL (ref 0–1)
BUN SERPL-MCNC: 15 MG/DL (ref 7–20)
CALCIUM SERPL-MCNC: 9 MG/DL (ref 8.3–10.6)
CHLORIDE SERPL-SCNC: 96 MMOL/L (ref 99–110)
CO2 SERPL-SCNC: 28 MMOL/L (ref 21–32)
CREAT SERPL-MCNC: 0.7 MG/DL (ref 0.8–1.3)
DEPRECATED RDW RBC AUTO: 15.9 % (ref 12.4–15.4)
EOSINOPHIL # BLD: 0.3 K/UL (ref 0–0.6)
EOSINOPHIL NFR BLD: 2.5 %
GFR SERPLBLD CREATININE-BSD FMLA CKD-EPI: >60 ML/MIN/{1.73_M2}
GLUCOSE SERPL-MCNC: 108 MG/DL (ref 70–99)
HCT VFR BLD AUTO: 47.4 % (ref 40.5–52.5)
HGB BLD-MCNC: 16 G/DL (ref 13.5–17.5)
INR PPP: 0.89 (ref 0.84–1.16)
LYMPHOCYTES # BLD: 2 K/UL (ref 1–5.1)
LYMPHOCYTES NFR BLD: 16 %
MCH RBC QN AUTO: 30.6 PG (ref 26–34)
MCHC RBC AUTO-ENTMCNC: 33.7 G/DL (ref 31–36)
MCV RBC AUTO: 90.6 FL (ref 80–100)
MONOCYTES # BLD: 1.2 K/UL (ref 0–1.3)
MONOCYTES NFR BLD: 9.5 %
NEUTROPHILS # BLD: 8.9 K/UL (ref 1.7–7.7)
NEUTROPHILS NFR BLD: 71.6 %
PLATELET # BLD AUTO: 399 K/UL (ref 135–450)
PMV BLD AUTO: 8 FL (ref 5–10.5)
POTASSIUM SERPL-SCNC: 4.2 MMOL/L (ref 3.5–5.1)
PROT SERPL-MCNC: 7.1 G/DL (ref 6.4–8.2)
PROTHROMBIN TIME: 12.1 SEC (ref 11.5–14.8)
RBC # BLD AUTO: 5.23 M/UL (ref 4.2–5.9)
SODIUM SERPL-SCNC: 135 MMOL/L (ref 136–145)
WBC # BLD AUTO: 12.5 K/UL (ref 4–11)

## 2023-12-03 PROCEDURE — 71260 CT THORAX DX C+: CPT

## 2023-12-03 PROCEDURE — 70450 CT HEAD/BRAIN W/O DYE: CPT

## 2023-12-03 PROCEDURE — 85610 PROTHROMBIN TIME: CPT

## 2023-12-03 PROCEDURE — 85025 COMPLETE CBC W/AUTO DIFF WBC: CPT

## 2023-12-03 PROCEDURE — 86900 BLOOD TYPING SEROLOGIC ABO: CPT

## 2023-12-03 PROCEDURE — 72125 CT NECK SPINE W/O DYE: CPT

## 2023-12-03 PROCEDURE — 86901 BLOOD TYPING SEROLOGIC RH(D): CPT

## 2023-12-03 PROCEDURE — 80053 COMPREHEN METABOLIC PANEL: CPT

## 2023-12-03 PROCEDURE — 86850 RBC ANTIBODY SCREEN: CPT

## 2023-12-03 PROCEDURE — 85730 THROMBOPLASTIN TIME PARTIAL: CPT

## 2023-12-03 PROCEDURE — 99285 EMERGENCY DEPT VISIT HI MDM: CPT

## 2023-12-03 PROCEDURE — 96374 THER/PROPH/DIAG INJ IV PUSH: CPT

## 2023-12-04 PROBLEM — S22.41XA CLOSED FRACTURE OF MULTIPLE RIBS OF RIGHT SIDE, INITIAL ENCOUNTER: Status: ACTIVE | Noted: 2023-12-04

## 2023-12-04 LAB
ABO + RH BLD: NORMAL
ANION GAP SERPL CALCULATED.3IONS-SCNC: 8 MMOL/L (ref 3–16)
BASOPHILS # BLD: 0 K/UL (ref 0–0.2)
BASOPHILS NFR BLD: 0.4 %
BLD GP AB SCN SERPL QL: NORMAL
BUN SERPL-MCNC: 16 MG/DL (ref 7–20)
CALCIUM SERPL-MCNC: 8.5 MG/DL (ref 8.3–10.6)
CHLORIDE SERPL-SCNC: 102 MMOL/L (ref 99–110)
CO2 SERPL-SCNC: 27 MMOL/L (ref 21–32)
CREAT SERPL-MCNC: 0.8 MG/DL (ref 0.8–1.3)
DEPRECATED RDW RBC AUTO: 15.8 % (ref 12.4–15.4)
EOSINOPHIL # BLD: 0.2 K/UL (ref 0–0.6)
EOSINOPHIL NFR BLD: 2.2 %
GFR SERPLBLD CREATININE-BSD FMLA CKD-EPI: >60 ML/MIN/{1.73_M2}
GLUCOSE SERPL-MCNC: 102 MG/DL (ref 70–99)
HCT VFR BLD AUTO: 44.5 % (ref 40.5–52.5)
HGB BLD-MCNC: 15 G/DL (ref 13.5–17.5)
LYMPHOCYTES # BLD: 1.7 K/UL (ref 1–5.1)
LYMPHOCYTES NFR BLD: 14.7 %
MCH RBC QN AUTO: 30.3 PG (ref 26–34)
MCHC RBC AUTO-ENTMCNC: 33.6 G/DL (ref 31–36)
MCV RBC AUTO: 90.2 FL (ref 80–100)
MONOCYTES # BLD: 1.1 K/UL (ref 0–1.3)
MONOCYTES NFR BLD: 9.7 %
NEUTROPHILS # BLD: 8.2 K/UL (ref 1.7–7.7)
NEUTROPHILS NFR BLD: 73 %
PLATELET # BLD AUTO: 342 K/UL (ref 135–450)
PMV BLD AUTO: 7.7 FL (ref 5–10.5)
POTASSIUM SERPL-SCNC: 4.1 MMOL/L (ref 3.5–5.1)
RBC # BLD AUTO: 4.93 M/UL (ref 4.2–5.9)
SODIUM SERPL-SCNC: 137 MMOL/L (ref 136–145)
WBC # BLD AUTO: 11.3 K/UL (ref 4–11)

## 2023-12-04 PROCEDURE — 6370000000 HC RX 637 (ALT 250 FOR IP): Performed by: PHYSICIAN ASSISTANT

## 2023-12-04 PROCEDURE — 94761 N-INVAS EAR/PLS OXIMETRY MLT: CPT

## 2023-12-04 PROCEDURE — 1200000000 HC SEMI PRIVATE

## 2023-12-04 PROCEDURE — 6360000004 HC RX CONTRAST MEDICATION: Performed by: PHYSICIAN ASSISTANT

## 2023-12-04 PROCEDURE — 2580000003 HC RX 258: Performed by: PHYSICIAN ASSISTANT

## 2023-12-04 PROCEDURE — 6360000002 HC RX W HCPCS: Performed by: PHYSICIAN ASSISTANT

## 2023-12-04 PROCEDURE — 97530 THERAPEUTIC ACTIVITIES: CPT

## 2023-12-04 PROCEDURE — 6370000000 HC RX 637 (ALT 250 FOR IP): Performed by: INTERNAL MEDICINE

## 2023-12-04 PROCEDURE — 85025 COMPLETE CBC W/AUTO DIFF WBC: CPT

## 2023-12-04 PROCEDURE — 80048 BASIC METABOLIC PNL TOTAL CA: CPT

## 2023-12-04 PROCEDURE — 94640 AIRWAY INHALATION TREATMENT: CPT

## 2023-12-04 PROCEDURE — 94669 MECHANICAL CHEST WALL OSCILL: CPT

## 2023-12-04 PROCEDURE — 6360000002 HC RX W HCPCS: Performed by: INTERNAL MEDICINE

## 2023-12-04 PROCEDURE — 97116 GAIT TRAINING THERAPY: CPT

## 2023-12-04 PROCEDURE — 94150 VITAL CAPACITY TEST: CPT

## 2023-12-04 PROCEDURE — 97161 PT EVAL LOW COMPLEX 20 MIN: CPT

## 2023-12-04 PROCEDURE — 36415 COLL VENOUS BLD VENIPUNCTURE: CPT

## 2023-12-04 PROCEDURE — 97165 OT EVAL LOW COMPLEX 30 MIN: CPT

## 2023-12-04 RX ORDER — SODIUM CHLORIDE 9 MG/ML
INJECTION, SOLUTION INTRAVENOUS PRN
Status: DISCONTINUED | OUTPATIENT
Start: 2023-12-04 | End: 2023-12-05 | Stop reason: HOSPADM

## 2023-12-04 RX ORDER — KETOROLAC TROMETHAMINE 15 MG/ML
15 INJECTION, SOLUTION INTRAMUSCULAR; INTRAVENOUS ONCE
Status: COMPLETED | OUTPATIENT
Start: 2023-12-04 | End: 2023-12-04

## 2023-12-04 RX ORDER — GUAIFENESIN/DEXTROMETHORPHAN 100-10MG/5
5 SYRUP ORAL EVERY 4 HOURS PRN
Status: DISCONTINUED | OUTPATIENT
Start: 2023-12-04 | End: 2023-12-05 | Stop reason: HOSPADM

## 2023-12-04 RX ORDER — HYDROCODONE BITARTRATE AND ACETAMINOPHEN 5; 325 MG/1; MG/1
1 TABLET ORAL EVERY 4 HOURS PRN
Status: DISCONTINUED | OUTPATIENT
Start: 2023-12-04 | End: 2023-12-05 | Stop reason: HOSPADM

## 2023-12-04 RX ORDER — SODIUM CHLORIDE 0.9 % (FLUSH) 0.9 %
5-40 SYRINGE (ML) INJECTION EVERY 12 HOURS SCHEDULED
Status: DISCONTINUED | OUTPATIENT
Start: 2023-12-04 | End: 2023-12-05 | Stop reason: HOSPADM

## 2023-12-04 RX ORDER — LANOLIN ALCOHOL/MO/W.PET/CERES
9 CREAM (GRAM) TOPICAL NIGHTLY
Status: DISCONTINUED | OUTPATIENT
Start: 2023-12-04 | End: 2023-12-05 | Stop reason: HOSPADM

## 2023-12-04 RX ORDER — PRIMIDONE 250 MG/1
250 TABLET ORAL 3 TIMES DAILY
Status: DISCONTINUED | OUTPATIENT
Start: 2023-12-04 | End: 2023-12-05 | Stop reason: HOSPADM

## 2023-12-04 RX ORDER — ATORVASTATIN CALCIUM 40 MG/1
40 TABLET, FILM COATED ORAL DAILY
Status: DISCONTINUED | OUTPATIENT
Start: 2023-12-04 | End: 2023-12-05 | Stop reason: HOSPADM

## 2023-12-04 RX ORDER — SODIUM CHLORIDE 0.9 % (FLUSH) 0.9 %
5-40 SYRINGE (ML) INJECTION PRN
Status: DISCONTINUED | OUTPATIENT
Start: 2023-12-04 | End: 2023-12-05 | Stop reason: HOSPADM

## 2023-12-04 RX ORDER — PRIMIDONE 250 MG/1
250 TABLET ORAL 3 TIMES DAILY
COMMUNITY
Start: 2023-11-27

## 2023-12-04 RX ORDER — ALBUTEROL SULFATE 2.5 MG/3ML
2.5 SOLUTION RESPIRATORY (INHALATION) EVERY 6 HOURS PRN
Status: DISCONTINUED | OUTPATIENT
Start: 2023-12-04 | End: 2023-12-05 | Stop reason: HOSPADM

## 2023-12-04 RX ORDER — FAMOTIDINE 20 MG/1
20 TABLET, FILM COATED ORAL 2 TIMES DAILY
Status: DISCONTINUED | OUTPATIENT
Start: 2023-12-04 | End: 2023-12-05 | Stop reason: HOSPADM

## 2023-12-04 RX ORDER — LIDOCAINE 4 G/G
1 PATCH TOPICAL DAILY
Status: DISCONTINUED | OUTPATIENT
Start: 2023-12-04 | End: 2023-12-05 | Stop reason: HOSPADM

## 2023-12-04 RX ORDER — SENNOSIDES A AND B 8.6 MG/1
1 TABLET, FILM COATED ORAL DAILY PRN
Status: DISCONTINUED | OUTPATIENT
Start: 2023-12-04 | End: 2023-12-05 | Stop reason: HOSPADM

## 2023-12-04 RX ORDER — HYDROCODONE BITARTRATE AND ACETAMINOPHEN 5; 325 MG/1; MG/1
1 TABLET ORAL ONCE
Status: COMPLETED | OUTPATIENT
Start: 2023-12-04 | End: 2023-12-04

## 2023-12-04 RX ORDER — ONDANSETRON 4 MG/1
4 TABLET, ORALLY DISINTEGRATING ORAL ONCE
Status: COMPLETED | OUTPATIENT
Start: 2023-12-04 | End: 2023-12-04

## 2023-12-04 RX ORDER — QUETIAPINE FUMARATE 200 MG/1
800 TABLET, FILM COATED ORAL NIGHTLY
Status: DISCONTINUED | OUTPATIENT
Start: 2023-12-04 | End: 2023-12-05 | Stop reason: HOSPADM

## 2023-12-04 RX ORDER — ONDANSETRON 2 MG/ML
4 INJECTION INTRAMUSCULAR; INTRAVENOUS EVERY 6 HOURS PRN
Status: DISCONTINUED | OUTPATIENT
Start: 2023-12-04 | End: 2023-12-05 | Stop reason: HOSPADM

## 2023-12-04 RX ORDER — ACETAMINOPHEN 325 MG/1
650 TABLET ORAL EVERY 6 HOURS PRN
Status: DISCONTINUED | OUTPATIENT
Start: 2023-12-04 | End: 2023-12-05 | Stop reason: HOSPADM

## 2023-12-04 RX ORDER — HYDROXYZINE PAMOATE 25 MG/1
50 CAPSULE ORAL 4 TIMES DAILY PRN
Status: DISCONTINUED | OUTPATIENT
Start: 2023-12-04 | End: 2023-12-05

## 2023-12-04 RX ORDER — LANOLIN ALCOHOL/MO/W.PET/CERES
1000 CREAM (GRAM) TOPICAL DAILY
Status: DISCONTINUED | OUTPATIENT
Start: 2023-12-04 | End: 2023-12-05 | Stop reason: HOSPADM

## 2023-12-04 RX ORDER — MONTELUKAST SODIUM 10 MG/1
10 TABLET ORAL DAILY
Status: DISCONTINUED | OUTPATIENT
Start: 2023-12-05 | End: 2023-12-05 | Stop reason: HOSPADM

## 2023-12-04 RX ORDER — ALBUTEROL SULFATE 2.5 MG/3ML
2.5 SOLUTION RESPIRATORY (INHALATION)
Status: DISCONTINUED | OUTPATIENT
Start: 2023-12-04 | End: 2023-12-05 | Stop reason: HOSPADM

## 2023-12-04 RX ORDER — KETOROLAC TROMETHAMINE 15 MG/ML
15 INJECTION, SOLUTION INTRAMUSCULAR; INTRAVENOUS EVERY 6 HOURS PRN
Status: DISCONTINUED | OUTPATIENT
Start: 2023-12-06 | End: 2023-12-05 | Stop reason: HOSPADM

## 2023-12-04 RX ORDER — TRAZODONE HYDROCHLORIDE 50 MG/1
150 TABLET ORAL EVERY EVENING
Status: DISCONTINUED | OUTPATIENT
Start: 2023-12-04 | End: 2023-12-05 | Stop reason: HOSPADM

## 2023-12-04 RX ORDER — LIDOCAINE 4 G/G
1 PATCH TOPICAL ONCE
Status: COMPLETED | OUTPATIENT
Start: 2023-12-04 | End: 2023-12-04

## 2023-12-04 RX ORDER — FLUOXETINE HYDROCHLORIDE 20 MG/1
60 CAPSULE ORAL DAILY
Status: DISCONTINUED | OUTPATIENT
Start: 2023-12-04 | End: 2023-12-05 | Stop reason: HOSPADM

## 2023-12-04 RX ORDER — MONTELUKAST SODIUM 10 MG/1
10 TABLET ORAL NIGHTLY
Status: DISCONTINUED | OUTPATIENT
Start: 2023-12-04 | End: 2023-12-04

## 2023-12-04 RX ORDER — ACETAMINOPHEN 650 MG/1
650 SUPPOSITORY RECTAL EVERY 6 HOURS PRN
Status: DISCONTINUED | OUTPATIENT
Start: 2023-12-04 | End: 2023-12-05 | Stop reason: HOSPADM

## 2023-12-04 RX ORDER — ACETAMINOPHEN 325 MG/1
650 TABLET ORAL EVERY 6 HOURS SCHEDULED
Status: DISCONTINUED | OUTPATIENT
Start: 2023-12-04 | End: 2023-12-05 | Stop reason: HOSPADM

## 2023-12-04 RX ORDER — KETOROLAC TROMETHAMINE 15 MG/ML
15 INJECTION, SOLUTION INTRAMUSCULAR; INTRAVENOUS EVERY 6 HOURS
Status: DISCONTINUED | OUTPATIENT
Start: 2023-12-04 | End: 2023-12-05 | Stop reason: HOSPADM

## 2023-12-04 RX ADMIN — FAMOTIDINE 20 MG: 20 TABLET, FILM COATED ORAL at 22:03

## 2023-12-04 RX ADMIN — CYANOCOBALAMIN TAB 1000 MCG 1000 MCG: 1000 TAB at 09:01

## 2023-12-04 RX ADMIN — PRIMIDONE 250 MG: 250 TABLET ORAL at 13:08

## 2023-12-04 RX ADMIN — FLUOXETINE 60 MG: 20 CAPSULE ORAL at 09:01

## 2023-12-04 RX ADMIN — ATORVASTATIN CALCIUM 40 MG: 40 TABLET, FILM COATED ORAL at 09:01

## 2023-12-04 RX ADMIN — KETOROLAC TROMETHAMINE 15 MG: 15 INJECTION, SOLUTION INTRAMUSCULAR; INTRAVENOUS at 09:02

## 2023-12-04 RX ADMIN — KETOROLAC TROMETHAMINE 15 MG: 15 INJECTION, SOLUTION INTRAMUSCULAR; INTRAVENOUS at 22:03

## 2023-12-04 RX ADMIN — ALBUTEROL SULFATE 2.5 MG: 2.5 SOLUTION RESPIRATORY (INHALATION) at 09:01

## 2023-12-04 RX ADMIN — MELATONIN TAB 3 MG 9 MG: 3 TAB at 22:04

## 2023-12-04 RX ADMIN — HYDROCODONE BITARTRATE AND ACETAMINOPHEN 1 TABLET: 5; 325 TABLET ORAL at 01:53

## 2023-12-04 RX ADMIN — PRIMIDONE 250 MG: 250 TABLET ORAL at 22:04

## 2023-12-04 RX ADMIN — TRAZODONE HYDROCHLORIDE 150 MG: 50 TABLET ORAL at 17:50

## 2023-12-04 RX ADMIN — ACETAMINOPHEN 650 MG: 325 TABLET ORAL at 14:15

## 2023-12-04 RX ADMIN — Medication 10 ML: at 09:05

## 2023-12-04 RX ADMIN — IOPAMIDOL 75 ML: 755 INJECTION, SOLUTION INTRAVENOUS at 00:17

## 2023-12-04 RX ADMIN — APIXABAN 5 MG: 5 TABLET, FILM COATED ORAL at 09:01

## 2023-12-04 RX ADMIN — ALBUTEROL SULFATE 2.5 MG: 2.5 SOLUTION RESPIRATORY (INHALATION) at 06:29

## 2023-12-04 RX ADMIN — ALBUTEROL SULFATE 2.5 MG: 2.5 SOLUTION RESPIRATORY (INHALATION) at 20:00

## 2023-12-04 RX ADMIN — HYDROCODONE BITARTRATE AND ACETAMINOPHEN 1 TABLET: 5; 325 TABLET ORAL at 07:11

## 2023-12-04 RX ADMIN — ONDANSETRON 4 MG: 4 TABLET, ORALLY DISINTEGRATING ORAL at 01:55

## 2023-12-04 RX ADMIN — FAMOTIDINE 20 MG: 20 TABLET, FILM COATED ORAL at 09:01

## 2023-12-04 RX ADMIN — KETOROLAC TROMETHAMINE 15 MG: 15 INJECTION, SOLUTION INTRAMUSCULAR; INTRAVENOUS at 02:05

## 2023-12-04 RX ADMIN — KETOROLAC TROMETHAMINE 15 MG: 15 INJECTION, SOLUTION INTRAMUSCULAR; INTRAVENOUS at 15:29

## 2023-12-04 RX ADMIN — ACETAMINOPHEN 650 MG: 325 TABLET ORAL at 17:49

## 2023-12-04 RX ADMIN — PRIMIDONE 250 MG: 250 TABLET ORAL at 09:02

## 2023-12-04 RX ADMIN — QUETIAPINE FUMARATE 800 MG: 200 TABLET ORAL at 22:03

## 2023-12-04 RX ADMIN — HYDROXYZINE PAMOATE 50 MG: 25 CAPSULE ORAL at 13:08

## 2023-12-04 RX ADMIN — Medication 10 ML: at 22:10

## 2023-12-04 RX ADMIN — APIXABAN 5 MG: 5 TABLET, FILM COATED ORAL at 22:04

## 2023-12-04 RX ADMIN — HYDROXYZINE PAMOATE 50 MG: 25 CAPSULE ORAL at 18:28

## 2023-12-04 ASSESSMENT — ENCOUNTER SYMPTOMS
DIARRHEA: 0
SHORTNESS OF BREATH: 1
BACK PAIN: 0
ABDOMINAL PAIN: 0
COLOR CHANGE: 0
CONSTIPATION: 0
PHOTOPHOBIA: 0
NAUSEA: 0
CHEST TIGHTNESS: 0
COUGH: 1
VOMITING: 0

## 2023-12-04 ASSESSMENT — PAIN DESCRIPTION - ONSET
ONSET: ON-GOING
ONSET: ON-GOING

## 2023-12-04 ASSESSMENT — PAIN DESCRIPTION - FREQUENCY: FREQUENCY: INTERMITTENT

## 2023-12-04 ASSESSMENT — PAIN DESCRIPTION - ORIENTATION
ORIENTATION: RIGHT

## 2023-12-04 ASSESSMENT — PAIN DESCRIPTION - PAIN TYPE: TYPE: ACUTE PAIN

## 2023-12-04 ASSESSMENT — PAIN DESCRIPTION - DESCRIPTORS
DESCRIPTORS: ACHING
DESCRIPTORS: ACHING

## 2023-12-04 ASSESSMENT — PAIN DESCRIPTION - LOCATION
LOCATION: RIB CAGE

## 2023-12-04 ASSESSMENT — PAIN SCALES - GENERAL
PAINLEVEL_OUTOF10: 6
PAINLEVEL_OUTOF10: 2
PAINLEVEL_OUTOF10: 8
PAINLEVEL_OUTOF10: 6
PAINLEVEL_OUTOF10: 7

## 2023-12-04 NOTE — ED PROVIDER NOTES
[facility]        Pt Name: Jodi Cha  MRN: [id number]  [address] [facility] [place] [birth year]  Date of evaluation: 12/3/2023  Provider: CARMEN Bhakta  PCP: Capri Roblero MD  Note Started: 1:56 AM EST 12/4/23      RUSTAM. I have evaluated this patient. CHIEF COMPLAINT       Chief Complaint   Patient presents with    Fall     Pt fell on Wednesday and has right side rib pain from fall, on blood thinners, denies any injury to head. Patient states he thinks he has fluid on his lungs and is unable to cough because of the injury sustained to ribs. HISTORY OF PRESENT ILLNESS: 1 or more Elements     History From: Patient  Limitations to history : None    Jodi Cha is a 77 y.o. male with past medical history of COPD, hypertension, CHF, asthma, CAD who presents ED with complaint of a fall. Patient reports on Wednesday he fell. He states it was a mechanical fall. He reports he was trying to fix his cable box on his TV. He states he lost his balance and he fell backwards and hit his right-sided rib cage against the coffee table. He did not hit his head. He reports he is on blood thinners with Eliquis. He reports he has pain to his right-sided rib cage with associated cough has been present since Wednesday. He states he does feel short of breath. He denies any hemoptysis or productive cough. Denies any fever or chills. Denies abdominal pain, nausea/vomiting, urinary symptoms or changes in bowel movements. Denies any headache, neck pain/stiffness, lightheadedness/dizziness, urinary symptoms or changes in bowel movements. Denies any other injury or trauma throughout. Rates his pain as a 6/10 to his right-sided rib cage. States worsened with palpation and movement. Has been taking over-the-counter Tylenol with minimal improvement of symptoms.     Nursing Notes were all reviewed and agreed with or any disagreements were

## 2023-12-04 NOTE — H&P
Aortic stenosis, Asthma, Atrial fibrillation (HCC), Atrial flutter (HCC), Azoospermia, Bipolar 1 disorder (HCC), Chronic insomnia, Colon polyps, COPD (chronic obstructive pulmonary disease) ([address]), Coronary artery calcification seen on CAT scan, Depression, Essential tremor, GERD (gastroesophageal reflux disease), Hiatal hernia, Hidradenitis suppurativa, history of alcoholism, Hyperlipidemia, Hypertension, Insomnia, Microcytic anemia, Obstructive sleep apnea, Osteoporosis, Prostate nodule, Seizures ([address]), Tobacco abuse, and Tremor. Past Surgical History:    Patient has a past surgical history that includes Cataract removal (Bilateral, 2009); Appendectomy (2001); Cardioversion (x4); Emmanuelle-en-Y Gastric Bypass (2001); Colonoscopy (10/01/2009); cyst removal; Atrial ablation surgery (08/05/2011); Atrial ablation surgery (09/01/2011); Upper gastrointestinal endoscopy (05/13/2013); Bariatric Surgery (08/05/2013); hernia repair; Atrial ablation surgery (09/03/2015); other surgical history (09/21/2015); Hemorrhoid surgery (03/23/2016); Abdomen surgery; Cholecystectomy, laparoscopic (N/A, 06/20/2016); Colonoscopy (N/A, 07/31/2020); Upper gastrointestinal endoscopy (N/A, 07/31/2020); Upper gastrointestinal endoscopy (N/A, 10/02/2020); Prostate biopsy (04/2021); Upper gastrointestinal endoscopy (04/2021); Colonoscopy (N/A, 07/14/2021); and Upper gastrointestinal endoscopy (07/28/2021). Medications Prior to Admission:      Prior to Admission medications    Medication Sig Start Date End Date Taking? Authorizing Provider   primidone (MYSOLINE) 250 MG tablet Take 1 tablet by mouth 3 times daily 11/27/23  Yes Provider, MD Sally   nystatin-triamcinolone (MYCOLOG) 598975-5.1 UNIT/GM-% ointment Apply topically 2 times daily for 2 weeks or until improved.  10/6/22   Samson Penaloza MD   acetaminophen (AMINOFEN) 325 MG tablet Take 2 tablets by mouth every 6 hours as needed for Pain 8/26/22   Piper Hernandez PA-C

## 2023-12-04 NOTE — RT PROTOCOL NOTE
work of breathing using Per Protocol order mode. 4-6 - enter or revise RT Bronchodilator order(s) to two equivalent RT bronchodilator orders with one order with BID Frequency and one order with Frequency of every 4 hours PRN wheezing or increased work of breathing using Per Protocol order mode. 7-10 - enter or revise RT Bronchodilator order(s) to two equivalent RT bronchodilator orders with one order with TID Frequency and one order with Frequency of every 4 hours PRN wheezing or increased work of breathing using Per Protocol order mode. 11-13 - enter or revise RT Bronchodilator order(s) to one equivalent RT bronchodilator order with QID Frequency and an Albuterol order with Frequency of every 4 hours PRN wheezing or increased work of breathing using Per Protocol order mode. Greater than 13 - enter or revise RT Bronchodilator order(s) to one equivalent RT bronchodilator order with every 4 hours Frequency and an Albuterol order with Frequency of every 2 hours PRN wheezing or increased work of breathing using Per Protocol order mode. RT to enter RT Home Evaluation for COPD & MDI Assessment order using Per Protocol order mode.     Electronically signed by Lui Richey RCP on 12/4/2023 at 5:38 AM

## 2023-12-04 NOTE — PLAN OF CARE
Problem: Discharge Planning  Goal: Discharge to home or other facility with appropriate resources  Outcome: Progressing     Problem: ABCDS Injury Assessment  Goal: Absence of physical injury  Outcome: Progressing     Problem: Safety - Adult  Goal: Free from fall injury  12/4/2023 1106 by Percy Valero RN  Outcome: Progressing  12/4/2023 0338 by Brielle Alonso RN  Outcome: Progressing     Problem: Pain  Goal: Verbalizes/displays adequate comfort level or baseline comfort level  Outcome: Progressing

## 2023-12-04 NOTE — ED NOTES
Patient alert and oriented. Patient transferred to floor by [facility], tech. Skin appropriate for ethnicity, dry and intact. No signs of acute distress noted at this time. Regular respiratory pattern, normal respiratory depth, unlabored respirations.            Kelly Bellamy RN  12/04/23 3472

## 2023-12-04 NOTE — ED NOTES
ED TO INPATIENT SBAR HANDOFF    Patient Name: Lizeth Palomo   :    77 y.o. MRN:  [id number]  Preferred Name  Leonardo De La Fuente  ED Room #:  ED-0019/19  Family/Caregiver Present no   Restraints no   Sitter no   Sepsis Risk Score Sepsis Risk Score: 2.83    Situation  Code Status: Full Code No additional code details. Allergies: Patient has no known allergies. Weight: Patient Vitals for the past 96 hrs (Last 3 readings):   Weight   23 2239 75.3 kg (166 lb)     Arrived from: home  Chief Complaint:   Chief Complaint   Patient presents with    Fall     Pt fell on Wednesday and has right side rib pain from fall, on blood thinners, denies any injury to head. Patient states he thinks he has fluid on his lungs and is unable to cough because of the injury sustained to ribs. Hospital Problem/Diagnosis:  Principal Problem:    Closed fracture of multiple ribs of right side, initial encounter  Resolved Problems:    * No resolved hospital problems. *    Imaging:   CT CHEST ABDOMEN PELVIS W CONTRAST Additional Contrast? None   Final Result   1. Acute right-sided rib fractures involving the 7th, 8th and 9th ribs. 2. No traumatic pneumothorax or focal pulmonary contusion. 3. Subsegmental atelectasis. 4. No solid organ injury or hemorrhage in the abdomen or pelvis. 5. Other nonacute incidental findings as above. CT CERVICAL SPINE WO CONTRAST   Final Result   1. No acute cervical spine fracture. 2. Multilevel degenerative disc disease and facet arthropathy. CT THORACIC SPINE BONY RECONSTRUCTION   Final Result   1. No acute thoracic or lumbar spine fracture. CT LUMBAR SPINE BONY RECONSTRUCTION   Final Result   1. No acute thoracic or lumbar spine fracture. CT HEAD WO CONTRAST   Final Result   1. No acute intracranial abnormality is identified.    2.  Generalized age-related cortical atrophy, with intracranial   atherosclerosis and CT findings suggestive of chronic

## 2023-12-05 VITALS
SYSTOLIC BLOOD PRESSURE: 109 MMHG | HEIGHT: 68 IN | RESPIRATION RATE: 18 BRPM | DIASTOLIC BLOOD PRESSURE: 63 MMHG | WEIGHT: 165.34 LBS | BODY MASS INDEX: 25.06 KG/M2 | OXYGEN SATURATION: 95 % | HEART RATE: 65 BPM | TEMPERATURE: 98.3 F

## 2023-12-05 LAB
ANION GAP SERPL CALCULATED.3IONS-SCNC: 6 MMOL/L (ref 3–16)
BACTERIA URNS QL MICRO: ABNORMAL /HPF
BASOPHILS # BLD: 0 K/UL (ref 0–0.2)
BASOPHILS NFR BLD: 0.5 %
BILIRUB UR QL STRIP.AUTO: ABNORMAL
BUN SERPL-MCNC: 25 MG/DL (ref 7–20)
CALCIUM SERPL-MCNC: 8.4 MG/DL (ref 8.3–10.6)
CHLORIDE SERPL-SCNC: 104 MMOL/L (ref 99–110)
CLARITY UR: CLEAR
CO2 SERPL-SCNC: 26 MMOL/L (ref 21–32)
COLOR UR: ABNORMAL
CREAT SERPL-MCNC: 0.7 MG/DL (ref 0.8–1.3)
DEPRECATED RDW RBC AUTO: 15.9 % (ref 12.4–15.4)
EOSINOPHIL # BLD: 0.4 K/UL (ref 0–0.6)
EOSINOPHIL NFR BLD: 4.7 %
EPI CELLS #/AREA URNS AUTO: 7 /HPF (ref 0–5)
GFR SERPLBLD CREATININE-BSD FMLA CKD-EPI: >60 ML/MIN/{1.73_M2}
GLUCOSE SERPL-MCNC: 92 MG/DL (ref 70–99)
GLUCOSE UR STRIP.AUTO-MCNC: NEGATIVE MG/DL
HCT VFR BLD AUTO: 43.6 % (ref 40.5–52.5)
HGB BLD-MCNC: 14.6 G/DL (ref 13.5–17.5)
HGB UR QL STRIP.AUTO: NEGATIVE
HYALINE CASTS #/AREA URNS AUTO: 2 /LPF (ref 0–8)
KETONES UR STRIP.AUTO-MCNC: NEGATIVE MG/DL
LEUKOCYTE ESTERASE UR QL STRIP.AUTO: ABNORMAL
LYMPHOCYTES # BLD: 1.9 K/UL (ref 1–5.1)
LYMPHOCYTES NFR BLD: 20.9 %
MCH RBC QN AUTO: 30.4 PG (ref 26–34)
MCHC RBC AUTO-ENTMCNC: 33.4 G/DL (ref 31–36)
MCV RBC AUTO: 91.2 FL (ref 80–100)
MONOCYTES # BLD: 0.8 K/UL (ref 0–1.3)
MONOCYTES NFR BLD: 9.3 %
NEUTROPHILS # BLD: 5.9 K/UL (ref 1.7–7.7)
NEUTROPHILS NFR BLD: 64.6 %
NITRITE UR QL STRIP.AUTO: NEGATIVE
PH UR STRIP.AUTO: 5.5 [PH] (ref 5–8)
PLATELET # BLD AUTO: 339 K/UL (ref 135–450)
PMV BLD AUTO: 7.8 FL (ref 5–10.5)
POTASSIUM SERPL-SCNC: 5.2 MMOL/L (ref 3.5–5.1)
PROT UR STRIP.AUTO-MCNC: ABNORMAL MG/DL
RBC # BLD AUTO: 4.78 M/UL (ref 4.2–5.9)
RBC CLUMPS #/AREA URNS AUTO: 1 /HPF (ref 0–4)
SODIUM SERPL-SCNC: 136 MMOL/L (ref 136–145)
SP GR UR STRIP.AUTO: >=1.03 (ref 1–1.03)
UA COMPLETE W REFLEX CULTURE PNL UR: ABNORMAL
UA DIPSTICK W REFLEX MICRO PNL UR: YES
URN SPEC COLLECT METH UR: ABNORMAL
UROBILINOGEN UR STRIP-ACNC: 1 E.U./DL
WBC # BLD AUTO: 9.1 K/UL (ref 4–11)
WBC #/AREA URNS AUTO: 1 /HPF (ref 0–5)

## 2023-12-05 PROCEDURE — 6370000000 HC RX 637 (ALT 250 FOR IP): Performed by: PHYSICIAN ASSISTANT

## 2023-12-05 PROCEDURE — 94669 MECHANICAL CHEST WALL OSCILL: CPT

## 2023-12-05 PROCEDURE — 81001 URINALYSIS AUTO W/SCOPE: CPT

## 2023-12-05 PROCEDURE — 80048 BASIC METABOLIC PNL TOTAL CA: CPT

## 2023-12-05 PROCEDURE — 2580000003 HC RX 258: Performed by: PHYSICIAN ASSISTANT

## 2023-12-05 PROCEDURE — 85025 COMPLETE CBC W/AUTO DIFF WBC: CPT

## 2023-12-05 PROCEDURE — 94761 N-INVAS EAR/PLS OXIMETRY MLT: CPT

## 2023-12-05 PROCEDURE — 36415 COLL VENOUS BLD VENIPUNCTURE: CPT

## 2023-12-05 PROCEDURE — 94640 AIRWAY INHALATION TREATMENT: CPT

## 2023-12-05 PROCEDURE — 6360000002 HC RX W HCPCS: Performed by: INTERNAL MEDICINE

## 2023-12-05 PROCEDURE — 6360000002 HC RX W HCPCS: Performed by: PHYSICIAN ASSISTANT

## 2023-12-05 RX ORDER — LIDOCAINE 4 G/G
1 PATCH TOPICAL DAILY
Qty: 20 EACH | Refills: 0 | Status: SHIPPED | OUTPATIENT
Start: 2023-12-06

## 2023-12-05 RX ORDER — HYDROCODONE BITARTRATE AND ACETAMINOPHEN 5; 325 MG/1; MG/1
1 TABLET ORAL EVERY 8 HOURS PRN
Qty: 30 TABLET | Refills: 0 | Status: SHIPPED | OUTPATIENT
Start: 2023-12-05 | End: 2023-12-15

## 2023-12-05 RX ORDER — HYDROXYZINE PAMOATE 25 MG/1
100 CAPSULE ORAL 4 TIMES DAILY PRN
Status: DISCONTINUED | OUTPATIENT
Start: 2023-12-05 | End: 2023-12-05 | Stop reason: HOSPADM

## 2023-12-05 RX ADMIN — FAMOTIDINE 20 MG: 20 TABLET, FILM COATED ORAL at 08:22

## 2023-12-05 RX ADMIN — APIXABAN 5 MG: 5 TABLET, FILM COATED ORAL at 08:22

## 2023-12-05 RX ADMIN — MONTELUKAST SODIUM 10 MG: 10 TABLET, FILM COATED ORAL at 08:26

## 2023-12-05 RX ADMIN — KETOROLAC TROMETHAMINE 15 MG: 15 INJECTION, SOLUTION INTRAMUSCULAR; INTRAVENOUS at 08:22

## 2023-12-05 RX ADMIN — PRIMIDONE 250 MG: 250 TABLET ORAL at 08:22

## 2023-12-05 RX ADMIN — ALBUTEROL SULFATE 2.5 MG: 2.5 SOLUTION RESPIRATORY (INHALATION) at 07:18

## 2023-12-05 RX ADMIN — FLUOXETINE 60 MG: 20 CAPSULE ORAL at 08:22

## 2023-12-05 RX ADMIN — ATORVASTATIN CALCIUM 40 MG: 40 TABLET, FILM COATED ORAL at 08:22

## 2023-12-05 RX ADMIN — HYDROCODONE BITARTRATE AND ACETAMINOPHEN 1 TABLET: 5; 325 TABLET ORAL at 05:40

## 2023-12-05 RX ADMIN — CYANOCOBALAMIN TAB 1000 MCG 1000 MCG: 1000 TAB at 08:22

## 2023-12-05 RX ADMIN — KETOROLAC TROMETHAMINE 15 MG: 15 INJECTION, SOLUTION INTRAMUSCULAR; INTRAVENOUS at 04:20

## 2023-12-05 RX ADMIN — Medication 10 ML: at 08:26

## 2023-12-05 ASSESSMENT — PAIN SCALES - GENERAL
PAINLEVEL_OUTOF10: 3
PAINLEVEL_OUTOF10: 0
PAINLEVEL_OUTOF10: 0
PAINLEVEL_OUTOF10: 7
PAINLEVEL_OUTOF10: 0
PAINLEVEL_OUTOF10: 0

## 2023-12-05 ASSESSMENT — PAIN DESCRIPTION - DESCRIPTORS: DESCRIPTORS: ACHING

## 2023-12-05 ASSESSMENT — PAIN - FUNCTIONAL ASSESSMENT: PAIN_FUNCTIONAL_ASSESSMENT: ACTIVITIES ARE NOT PREVENTED

## 2023-12-05 ASSESSMENT — PAIN DESCRIPTION - LOCATION: LOCATION: RIB CAGE

## 2023-12-05 ASSESSMENT — PAIN DESCRIPTION - ORIENTATION: ORIENTATION: RIGHT

## 2023-12-05 NOTE — DISCHARGE INSTR - COC
Continuity of Care Form    Patient Name: Kavita Guevara   :    MRN:  [id number]    [address] date:  12/3/2023  Discharge date:  2023    Code Status Order: Full Code   Advance Directives:     Admitting Physician:  Fred Forde MD  PCP: Elinor Noguera MD    Discharging Nurse: Venessa Martinez RN  [facility] Unit/Room#: 6AZ-8046/4513-44  Discharging Unit Phone Number: [phone]    Emergency Contact:   Extended Emergency Contact Information  Primary Emergency Contact: Josi Escobar [facility] [place] of [address] [address] Phone: [phone]  Work Phone: [phone]  Mobile Phone: [phone]  Relation: Other  Secondary Emergency Contact: [address] of [address] [address] [place] Phone: [phone]  Relation: Other    Past Surgical History:  Past Surgical History:   Procedure Laterality Date    ABDOMEN SURGERY      gastric band    APPENDECTOMY      [address] [place] [address]  2011    atrial flutter ablation    ATRIAL ABLATION SURGERY  2011    cryoablation for atrial fibrillation    ATRIAL ABLATION SURGERY  2015    RFCA for AF with PVI    BARIATRIC SURGERY  2013    band over bypass    CARDIOVERSION  x4    CATARACT REMOVAL Bilateral     [facility], LAPAROSCOPIC N/A 2016    LAPAROSCOPIC CHOLECYSTECTOMY WITH CHOLANGIOGRAM    COLONOSCOPY  10/01/2009    Keegan, repeat 3 years    COLONOSCOPY N/A 2020    COLONOSCOPY POLYPECTOMY SNARE/COLD BIOPSY performed by Aparna Cuba MD at [facility] N/A 2021    fu 3 yrs, COLONOSCOPY POLYPECTOMY SNARE/COLD BIOPSY performed by Aparna Cuba MD at [address] [place] [address]      from chest    HEMORRHOID SURGERY  2016    HERNIA REPAIR      Repaired in conjunction with Band Over Bypass, .     OTHER SURGICAL HISTORY  2015    excision vivian rectal cyst    PROSTATE BIOPSY  2021    neg    DIMITRIS-EN-Y GASTRIC BYPASS

## 2023-12-05 NOTE — PLAN OF CARE
Problem: Discharge Planning  Goal: Discharge to home or other facility with appropriate resources  12/4/2023 2337 by Willie Linda RN  Outcome: Progressing     Problem: ABCDS Injury Assessment  Goal: Absence of physical injury  12/4/2023 2337 by Willie Linda RN  Outcome: Progressing     Problem: Safety - Adult  Goal: Free from fall injury  12/4/2023 2337 by Willie Linda RN  Outcome: Progressing     Problem: Pain  Goal: Verbalizes/displays adequate comfort level or baseline comfort level  12/4/2023 2337 by Willie Linda RN  Outcome: Progressing

## 2023-12-05 NOTE — PLAN OF CARE
Problem: Discharge Planning  Goal: Discharge to home or other facility with appropriate resources  12/5/2023 1212 by Timothy Mc RN  Outcome: Completed     Problem: ABCDS Injury Assessment  Goal: Absence of physical injury  12/5/2023 1212 by Timothy Mc RN  Outcome: Completed     Problem: Safety - Adult  Goal: Free from fall injury  12/5/2023 1212 by Timothy Mc RN  Outcome: Completed     Problem: Pain  Goal: Verbalizes/displays adequate comfort level or baseline comfort level  12/5/2023 1212 by Timothy Mc RN  Outcome: Completed

## 2023-12-05 NOTE — PLAN OF CARE
Problem: Discharge Planning  Goal: Discharge to home or other facility with appropriate resources  12/5/2023 0835 by Roopa Davis RN  Outcome: Progressing     Problem: ABCDS Injury Assessment  Goal: Absence of physical injury  12/5/2023 0835 by Roopa Davis RN  Outcome: Progressing     Problem: Safety - Adult  Goal: Free from fall injury  12/5/2023 0835 by Roopa Davis RN  Outcome: Progressing     Problem: Pain  Goal: Verbalizes/displays adequate comfort level or baseline comfort level  12/5/2023 0835 by Roopa Davis RN  Outcome: Progressing

## 2023-12-05 NOTE — PROGRESS NOTES
12/04/23 0537   RT Protocol   History Pulmonary Disease 2   Respiratory pattern 0   Breath sounds 2   Cough 0   Indications for Bronchodilator Therapy On home bronchodilators   Bronchodilator Assessment Score 4
2205: shift assessment done, VSS, medications given as per [facility], call light within reach, fall precautions in place, bed alarm on, plan of care ongoing. The care plan and education has been reviewed and mutually agreed upon with the patient.
[facility] Department   Phone: [phone]    Occupational Therapy    [x] Initial Evaluation            [] Daily Treatment Note         [] Discharge Summary      Patient: Raul Duran   : [birth year]   MRN: [id number]   Date of Service:  2023    Admitting Diagnosis:  Closed fracture of multiple ribs of right side, initial encounter  Current Admission Summary: 77 y.o. male who presents to the ED for evaluation of rib pain. Patient reports he had a mechanical fall 4 days ago while trying to fix the cable box on his TV. Patient reports he lost his balance and fell backwards and hit the right side of his rib cage against a coffee table. He denies hitting his head. He denies any other injuries related to the fall. He is anticoagulated on Eliquis for A-fib. He reports pain to the right side of his rib cage is worse with palpation and movement. He reports he feels like he needs to cough but is unable to due to pain. He also feels like he can't get a good breath secondary to pain. He has been taking over-the-counter Tylenol with minimal improvement in symptoms. He denies fever, chills, hemoptysis, abdominal pain, nausea, vomiting, diarrhea, urinary symptoms. He denies headache, dizziness, neck pain or stiffness, changes in vision, difficulty swallowing, numbness/tingling extremities, focal weakness. Workup initiated in ED revealed acute nondisplaced right-sided rib fractures involving the 7th, 8th and 9th ribs and subsegmental atelectasis. No traumatic pneumothorax or focal pulmonary contusion. No solid organ injury or hemorrhage in the abdomen or pelvis. Patient SpO2 ranging from 92-95%. His respiratory rate does increase to the mid 20s with activity. Incentive spirometer performed in the ED and patient unable to pull more than 500 mL. Given patient's age and comorbidities, he is at high risk for pneumonia.  Thus, patient felt to be a poor candidate for
CLINICAL PHARMACY NOTE: MEDS TO BEDS    Total # of Prescriptions Filled: 2   The following medications were delivered to the patient:  LIDOCAINE PATCHES  HYDROCODONE - ACETAMINOPHEN 5    Additional Documentation:  Patient picked up in outpatient pharmacy = signed  [address]
Discharge instructions and medications gone over with patient. Patient verbalizes understanding discharge instructions and medications. All questions answered.
Incentive Spirometry education and demonstration completed by Respiratory Therapy Yes      Response to education: Good     Teaching Time: 10 minutes    Minimum Predicted Vital Capacity - 683 mL. Patient's Actual Vital Capacity - 1500 mL. Turning over to Nursing for routine follow-up Yes.     Comments: patient states been using it    Electronically signed by Michael Flores RCP on 12/4/2023 at 9:08 AM  \
SPINE BONY RECONSTRUCTION   Final Result   1. No acute thoracic or lumbar spine fracture. CT HEAD WO CONTRAST   Final Result   1. No acute intracranial abnormality is identified. 2.  Generalized age-related cortical atrophy, with intracranial   atherosclerosis and CT findings suggestive of chronic microvascular ischemic   change. IP CONSULT TO SPIRITUAL SERVICES    Assessment/Plan:    Active Hospital Problems    Diagnosis     Closed fracture of multiple ribs of right side, initial encounter [S22.41XA]     Essential tremor [G25.0]     S/P gastric bypass [Z98.84]     CHF (congestive heart failure) ([facility]) [I50.9]     COPD (chronic obstructive pulmonary disease) ([facility]) [J44.9]      Closed R rib fractures  Continue Toradol 15 mg IV q6h  PT/OT recommend home PT/OT  [place] PRN  Afib  Continue Eliquis  COPD  Continue Albuterol  Essential tremor  Continue primidone    DVT Prophylaxis: Eliquis  Diet: ADULT DIET; Regular  Code Status: Full Code  PT/OT Eval Status: Following    Dispo - Home with home PT/OT    Discussed with patient, nursing and CM. Monitor overnight. Home tomorrow if pain tolerable.     Aiden Hawk MD
Minutes:  29 Minutes  Total Treatment Minutes:  44 Minutes       Electronically Signed By: Soo Hong, PT      Miguel Brewer PT, DPT #[id number]

## 2023-12-05 NOTE — CARE COORDINATION
Discharge Planning Note:    Met with the patient. [facility] options were reviewed and the patient did not have a preference.  The following referral was placed:    - [facility] [address]    Will continue to follow     CAMPBELL MiguelN RN    [facility]  Phone: [phone]
Representative Agree with the Discharge Plan?       David Gonzalez RN  Case Management Department  Ph: [phone] Fax: [phone]

## 2024-01-29 ENCOUNTER — HOSPITAL ENCOUNTER (INPATIENT)
Age: 79
LOS: 2 days | Discharge: HOME | DRG: 312 | End: 2024-02-01
Attending: EMERGENCY MEDICINE | Admitting: INTERNAL MEDICINE
Payer: MEDICARE

## 2024-01-29 ENCOUNTER — APPOINTMENT (OUTPATIENT)
Dept: CT IMAGING | Age: 79
DRG: 312 | End: 2024-01-29
Payer: MEDICARE

## 2024-01-29 ENCOUNTER — APPOINTMENT (OUTPATIENT)
Dept: GENERAL RADIOLOGY | Age: 79
DRG: 312 | End: 2024-01-29
Payer: MEDICARE

## 2024-01-29 DIAGNOSIS — Z79.01 ANTICOAGULATED: ICD-10-CM

## 2024-01-29 DIAGNOSIS — R53.1 GENERAL WEAKNESS: ICD-10-CM
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
## 2024-01-29 LAB
ALBUMIN SERPL-MCNC: 2.5 G/DL (ref 3.4–5)
ALBUMIN/GLOB SERPL: 0.8 {RATIO} (ref 1.1–2.2)
ALP SERPL-CCNC: 205 U/L (ref 40–129)
ALT SERPL-CCNC: 18 U/L (ref 10–40)
ANION GAP SERPL CALCULATED.3IONS-SCNC: 11 MMOL/L (ref 3–16)
APTT BLD: 39.8 SEC (ref 22.7–35.9)
AST SERPL-CCNC: 26 U/L (ref 15–37)
BASOPHILS # BLD: 0.1 K/UL (ref 0–0.2)
BASOPHILS NFR BLD: 0.7 %
BILIRUB SERPL-MCNC: 0.4 MG/DL (ref 0–1)
BUN SERPL-MCNC: 14 MG/DL (ref 7–20)
CALCIUM SERPL-MCNC: 7.7 MG/DL (ref 8.3–10.6)
CHLORIDE SERPL-SCNC: 102 MMOL/L (ref 99–110)
CK SERPL-CCNC: 38 U/L (ref 39–308)
CO2 SERPL-SCNC: 25 MMOL/L (ref 21–32)
CREAT SERPL-MCNC: 0.6 MG/DL (ref 0.8–1.3)
DEPRECATED RDW RBC AUTO: 16.4 % (ref 12.4–15.4)
EOSINOPHIL # BLD: 0.2 K/UL (ref 0–0.6)
EOSINOPHIL NFR BLD: 1.9 %
GFR SERPLBLD CREATININE-BSD FMLA CKD-EPI: >60 ML/MIN/{1.73_M2}
GLUCOSE SERPL-MCNC: 113 MG/DL (ref 70–99)
HCT VFR BLD AUTO: 37.9 % (ref 40.5–52.5)
HGB BLD-MCNC: 12.8 G/DL (ref 13.5–17.5)
INR PPP: 1.03 (ref 0.84–1.16)
LIPASE SERPL-CCNC: 36 U/L (ref 13–60)
LYMPHOCYTES # BLD: 1.4 K/UL (ref 1–5.1)
LYMPHOCYTES NFR BLD: 13.3 %
MCH RBC QN AUTO: 31.6 PG (ref 26–34)
MCHC RBC AUTO-ENTMCNC: 33.9 G/DL (ref 31–36)
MCV RBC AUTO: 93.2 FL (ref 80–100)
MONOCYTES # BLD: 0.8 K/UL (ref 0–1.3)
MONOCYTES NFR BLD: 7.7 %
NEUTROPHILS # BLD: 8.3 K/UL (ref 1.7–7.7)
NEUTROPHILS NFR BLD: 76.4 %
NT-PROBNP SERPL-MCNC: 199 PG/ML (ref 0–449)
PLATELET # BLD AUTO: 415 K/UL (ref 135–450)
PMV BLD AUTO: 8 FL (ref 5–10.5)
POTASSIUM SERPL-SCNC: 3.7 MMOL/L (ref 3.5–5.1)
PROT SERPL-MCNC: 5.6 G/DL (ref 6.4–8.2)
PROTHROMBIN TIME: 13.5 SEC (ref 11.5–14.8)
RBC # BLD AUTO: 4.06 M/UL (ref 4.2–5.9)
SODIUM SERPL-SCNC: 138 MMOL/L (ref 136–145)
TROPONIN, HIGH SENSITIVITY: 15 NG/L (ref 0–22)
WBC # BLD AUTO: 10.9 K/UL (ref 4–11)

## 2024-01-29 PROCEDURE — 71045 X-RAY EXAM CHEST 1 VIEW: CPT

## 2024-01-29 PROCEDURE — 84484 ASSAY OF TROPONIN QUANT: CPT

## 2024-01-29 PROCEDURE — 85025 COMPLETE CBC W/AUTO DIFF WBC: CPT

## 2024-01-29 PROCEDURE — 85730 THROMBOPLASTIN TIME PARTIAL: CPT

## 2024-01-29 PROCEDURE — 83690 ASSAY OF LIPASE: CPT

## 2024-01-29 PROCEDURE — 93005 ELECTROCARDIOGRAM TRACING: CPT | Performed by: PHYSICIAN ASSISTANT

## 2024-01-29 PROCEDURE — 83880 ASSAY OF NATRIURETIC PEPTIDE: CPT

## 2024-01-29 PROCEDURE — 85610 PROTHROMBIN TIME: CPT

## 2024-01-29 PROCEDURE — 99285 EMERGENCY DEPT VISIT HI MDM: CPT

## 2024-01-29 PROCEDURE — 80053 COMPREHEN METABOLIC PANEL: CPT

## 2024-01-29 PROCEDURE — 82550 ASSAY OF CK (CPK): CPT

## 2024-01-29 ASSESSMENT — PAIN - FUNCTIONAL ASSESSMENT: PAIN_FUNCTIONAL_ASSESSMENT: 0-10

## 2024-01-29 ASSESSMENT — PAIN SCALES - GENERAL: PAINLEVEL_OUTOF10: 0

## 2024-01-30 ENCOUNTER — APPOINTMENT (OUTPATIENT)
Dept: CT IMAGING | Age: 79
DRG: 312 | End: 2024-01-30
Payer: MEDICARE
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
## 2024-01-30 LAB
EKG ATRIAL RATE: 90 BPM
EKG DIAGNOSIS: NORMAL
EKG P AXIS: 104 DEGREES
EKG P-R INTERVAL: 132 MS
EKG Q-T INTERVAL: 414 MS
EKG QRS DURATION: 128 MS
EKG QTC CALCULATION (BAZETT): 506 MS
EKG R AXIS: -53 DEGREES
EKG T AXIS: 24 DEGREES
EKG VENTRICULAR RATE: 90 BPM
MAGNESIUM SERPL-MCNC: 1.8 MG/DL (ref 1.8–2.4)

## 2024-01-30 PROCEDURE — 6370000000 HC RX 637 (ALT 250 FOR IP): Performed by: NURSE PRACTITIONER

## 2024-01-30 PROCEDURE — 70450 CT HEAD/BRAIN W/O DYE: CPT

## 2024-01-30 PROCEDURE — 36415 COLL VENOUS BLD VENIPUNCTURE: CPT

## 2024-01-30 PROCEDURE — 97530 THERAPEUTIC ACTIVITIES: CPT

## 2024-01-30 PROCEDURE — 72125 CT NECK SPINE W/O DYE: CPT

## 2024-01-30 PROCEDURE — 97161 PT EVAL LOW COMPLEX 20 MIN: CPT

## 2024-01-30 PROCEDURE — 93005 ELECTROCARDIOGRAM TRACING: CPT | Performed by: INTERNAL MEDICINE

## 2024-01-30 PROCEDURE — 97116 GAIT TRAINING THERAPY: CPT

## 2024-01-30 PROCEDURE — 83735 ASSAY OF MAGNESIUM: CPT

## 2024-01-30 PROCEDURE — 2580000003 HC RX 258: Performed by: NURSE PRACTITIONER

## 2024-01-30 PROCEDURE — 6370000000 HC RX 637 (ALT 250 FOR IP)

## 2024-01-30 PROCEDURE — 94760 N-INVAS EAR/PLS OXIMETRY 1: CPT

## 2024-01-30 PROCEDURE — 97535 SELF CARE MNGMENT TRAINING: CPT

## 2024-01-30 PROCEDURE — 1200000000 HC SEMI PRIVATE

## 2024-01-30 PROCEDURE — 94640 AIRWAY INHALATION TREATMENT: CPT

## 2024-01-30 PROCEDURE — 93010 ELECTROCARDIOGRAM REPORT: CPT | Performed by: INTERNAL MEDICINE

## 2024-01-30 PROCEDURE — 2580000003 HC RX 258: Performed by: EMERGENCY MEDICINE

## 2024-01-30 PROCEDURE — 97165 OT EVAL LOW COMPLEX 30 MIN: CPT

## 2024-01-30 RX ORDER — MAGNESIUM SULFATE IN WATER 40 MG/ML
2000 INJECTION, SOLUTION INTRAVENOUS PRN
Status: DISCONTINUED | OUTPATIENT
Start: 2024-01-30 | End: 2024-02-01 | Stop reason: HOSPADM

## 2024-01-30 RX ORDER — SODIUM CHLORIDE 9 MG/ML
INJECTION, SOLUTION INTRAVENOUS PRN
Status: DISCONTINUED | OUTPATIENT
Start: 2024-01-30 | End: 2024-02-01 | Stop reason: HOSPADM

## 2024-01-30 RX ORDER — SODIUM CHLORIDE 0.9 % (FLUSH) 0.9 %
5-40 SYRINGE (ML) INJECTION EVERY 12 HOURS SCHEDULED
Status: DISCONTINUED | OUTPATIENT
Start: 2024-01-30 | End: 2024-02-01 | Stop reason: HOSPADM

## 2024-01-30 RX ORDER — POLYETHYLENE GLYCOL 3350 17 G/17G
17 POWDER, FOR SOLUTION ORAL DAILY PRN
Status: DISCONTINUED | OUTPATIENT
Start: 2024-01-30 | End: 2024-02-01 | Stop reason: HOSPADM

## 2024-01-30 RX ORDER — POTASSIUM CHLORIDE 20 MEQ/1
40 TABLET, EXTENDED RELEASE ORAL PRN
Status: DISCONTINUED | OUTPATIENT
Start: 2024-01-30 | End: 2024-02-01 | Stop reason: HOSPADM

## 2024-01-30 RX ORDER — LANOLIN ALCOHOL/MO/W.PET/CERES
20 CREAM (GRAM) TOPICAL NIGHTLY
Status: DISCONTINUED | OUTPATIENT
Start: 2024-01-30 | End: 2024-02-01 | Stop reason: HOSPADM

## 2024-01-30 RX ORDER — PRIMIDONE 250 MG/1
250 TABLET ORAL 3 TIMES DAILY
Status: DISCONTINUED | OUTPATIENT
Start: 2024-01-30 | End: 2024-02-01 | Stop reason: HOSPADM

## 2024-01-30 RX ORDER — ACETAMINOPHEN 650 MG/1
650 SUPPOSITORY RECTAL EVERY 6 HOURS PRN
Status: DISCONTINUED | OUTPATIENT
Start: 2024-01-30 | End: 2024-02-01 | Stop reason: HOSPADM

## 2024-01-30 RX ORDER — SODIUM CHLORIDE 9 MG/ML
INJECTION, SOLUTION INTRAVENOUS CONTINUOUS
Status: ACTIVE | OUTPATIENT
Start: 2024-01-30 | End: 2024-01-30

## 2024-01-30 RX ORDER — LIDOCAINE 4 G/G
1 PATCH TOPICAL DAILY
Status: DISCONTINUED | OUTPATIENT
Start: 2024-01-31 | End: 2024-02-01 | Stop reason: HOSPADM

## 2024-01-30 RX ORDER — LIDOCAINE 4 G/G
PATCH TOPICAL
Status: COMPLETED
Start: 2024-01-30 | End: 2024-01-30

## 2024-01-30 RX ORDER — SODIUM CHLORIDE 0.9 % (FLUSH) 0.9 %
5-40 SYRINGE (ML) INJECTION PRN
Status: DISCONTINUED | OUTPATIENT
Start: 2024-01-30 | End: 2024-02-01 | Stop reason: HOSPADM

## 2024-01-30 RX ORDER — GUAIFENESIN 200 MG/10ML
200 LIQUID ORAL 4 TIMES DAILY PRN
Status: DISCONTINUED | OUTPATIENT
Start: 2024-01-30 | End: 2024-02-01 | Stop reason: HOSPADM

## 2024-01-30 RX ORDER — ALENDRONATE SODIUM 70 MG/1
70 TABLET ORAL
Status: DISCONTINUED | OUTPATIENT
Start: 2024-01-30 | End: 2024-01-30 | Stop reason: RX

## 2024-01-30 RX ORDER — ONDANSETRON 4 MG/1
4 TABLET, ORALLY DISINTEGRATING ORAL EVERY 8 HOURS PRN
Status: DISCONTINUED | OUTPATIENT
Start: 2024-01-30 | End: 2024-02-01 | Stop reason: HOSPADM

## 2024-01-30 RX ORDER — FLUOXETINE HYDROCHLORIDE 20 MG/1
60 CAPSULE ORAL DAILY
Status: DISCONTINUED | OUTPATIENT
Start: 2024-01-30 | End: 2024-02-01 | Stop reason: HOSPADM

## 2024-01-30 RX ORDER — HYDROXYZINE PAMOATE 25 MG/1
100 CAPSULE ORAL 4 TIMES DAILY PRN
Status: DISCONTINUED | OUTPATIENT
Start: 2024-01-30 | End: 2024-02-01 | Stop reason: HOSPADM

## 2024-01-30 RX ORDER — FLUTICASONE PROPIONATE AND SALMETEROL 250; 50 UG/1; UG/1
1 POWDER RESPIRATORY (INHALATION) EVERY 12 HOURS
Status: DISCONTINUED | OUTPATIENT
Start: 2024-01-30 | End: 2024-01-30

## 2024-01-30 RX ORDER — ALBUTEROL SULFATE 90 UG/1
2 AEROSOL, METERED RESPIRATORY (INHALATION) EVERY 4 HOURS PRN
Status: DISCONTINUED | OUTPATIENT
Start: 2024-01-30 | End: 2024-02-01 | Stop reason: HOSPADM

## 2024-01-30 RX ORDER — LIDOCAINE 4 G/G
1 PATCH TOPICAL DAILY
Status: DISCONTINUED | OUTPATIENT
Start: 2024-01-30 | End: 2024-01-30

## 2024-01-30 RX ORDER — ATORVASTATIN CALCIUM 40 MG/1
40 TABLET, FILM COATED ORAL DAILY
Status: DISCONTINUED | OUTPATIENT
Start: 2024-01-30 | End: 2024-02-01 | Stop reason: HOSPADM

## 2024-01-30 RX ORDER — LIDOCAINE 4 G/G
1 PATCH TOPICAL ONCE
Status: COMPLETED | OUTPATIENT
Start: 2024-01-30 | End: 2024-01-30

## 2024-01-30 RX ORDER — 0.9 % SODIUM CHLORIDE 0.9 %
1000 INTRAVENOUS SOLUTION INTRAVENOUS ONCE
Status: COMPLETED | OUTPATIENT
Start: 2024-01-30 | End: 2024-01-30

## 2024-01-30 RX ORDER — ACETAMINOPHEN 325 MG/1
650 TABLET ORAL EVERY 6 HOURS PRN
Status: DISCONTINUED | OUTPATIENT
Start: 2024-01-30 | End: 2024-02-01 | Stop reason: HOSPADM

## 2024-01-30 RX ORDER — ONDANSETRON 2 MG/ML
4 INJECTION INTRAMUSCULAR; INTRAVENOUS EVERY 6 HOURS PRN
Status: DISCONTINUED | OUTPATIENT
Start: 2024-01-30 | End: 2024-02-01 | Stop reason: HOSPADM

## 2024-01-30 RX ORDER — MONTELUKAST SODIUM 10 MG/1
10 TABLET ORAL NIGHTLY
Status: DISCONTINUED | OUTPATIENT
Start: 2024-01-30 | End: 2024-02-01 | Stop reason: HOSPADM

## 2024-01-30 RX ORDER — POTASSIUM CHLORIDE 7.45 MG/ML
10 INJECTION INTRAVENOUS PRN
Status: DISCONTINUED | OUTPATIENT
Start: 2024-01-30 | End: 2024-02-01 | Stop reason: HOSPADM

## 2024-01-30 RX ADMIN — ATORVASTATIN CALCIUM 40 MG: 40 TABLET, FILM COATED ORAL at 09:26

## 2024-01-30 RX ADMIN — SODIUM CHLORIDE 1000 ML: 9 INJECTION, SOLUTION INTRAVENOUS at 02:29

## 2024-01-30 RX ADMIN — MELATONIN TAB 3 MG 19.5 MG: 3 TAB at 21:51

## 2024-01-30 RX ADMIN — PRIMIDONE 250 MG: 250 TABLET ORAL at 21:51

## 2024-01-30 RX ADMIN — PRIMIDONE 250 MG: 250 TABLET ORAL at 09:26

## 2024-01-30 RX ADMIN — Medication 10 ML: at 21:51

## 2024-01-30 RX ADMIN — FLUOXETINE HYDROCHLORIDE 60 MG: 20 CAPSULE ORAL at 09:26

## 2024-01-30 RX ADMIN — MONTELUKAST SODIUM 10 MG: 10 TABLET, FILM COATED ORAL at 21:51

## 2024-01-30 RX ADMIN — PRIMIDONE 250 MG: 250 TABLET ORAL at 14:30

## 2024-01-30 RX ADMIN — Medication 2 PUFF: at 09:53

## 2024-01-30 RX ADMIN — APIXABAN 5 MG: 5 TABLET, FILM COATED ORAL at 09:26

## 2024-01-30 RX ADMIN — SODIUM CHLORIDE: 9 INJECTION, SOLUTION INTRAVENOUS at 04:02

## 2024-01-30 RX ADMIN — HYDROXYZINE PAMOATE 100 MG: 25 CAPSULE ORAL at 16:04

## 2024-01-30 RX ADMIN — ACETAMINOPHEN 650 MG: 325 TABLET ORAL at 16:04

## 2024-01-30 RX ADMIN — APIXABAN 5 MG: 5 TABLET, FILM COATED ORAL at 21:51

## 2024-01-30 RX ADMIN — Medication 2 PUFF: at 22:03

## 2024-01-30 ASSESSMENT — PAIN DESCRIPTION - LOCATION: LOCATION: NECK

## 2024-01-30 ASSESSMENT — ENCOUNTER SYMPTOMS
VOMITING: 0
NAUSEA: 0
COUGH: 0
RHINORRHEA: 0
DIARRHEA: 0
ABDOMINAL PAIN: 0
SHORTNESS OF BREATH: 0

## 2024-01-30 ASSESSMENT — PAIN DESCRIPTION - DESCRIPTORS: DESCRIPTORS: ACHING;DISCOMFORT

## 2024-01-30 ASSESSMENT — PAIN SCALES - GENERAL
PAINLEVEL_OUTOF10: 2
PAINLEVEL_OUTOF10: 8

## 2024-01-30 ASSESSMENT — PAIN - FUNCTIONAL ASSESSMENT: PAIN_FUNCTIONAL_ASSESSMENT: ACTIVITIES ARE NOT PREVENTED

## 2024-01-30 ASSESSMENT — PAIN DESCRIPTION - ORIENTATION: ORIENTATION: RIGHT

## 2024-01-30 NOTE — PROGRESS NOTES
[facility] - Inpatient Rehabilitation Department   Phone: [phone]    Occupational Therapy    [x] Initial Evaluation            [] Daily Treatment Note         [] Discharge Summary      Patient: Kar Flanagan   : 1945   MRN: [id number]   Date of Service:  2024    Admitting Diagnosis:  Fall on same level from slipping, tripping or stumbling, initial encounter  Current Admission Summary: Current Admission Summary: The patient was admitted s/p a fall at home.   Past Medical History:  has a past medical history of Allergic rhintis, Anxiety, Aortic stenosis, Asthma, Atrial fibrillation (HCC), Atrial flutter (HCC), Azoospermia, Bipolar 1 disorder (HCC), Chronic insomnia, Colon polyps, COPD (chronic obstructive pulmonary disease) (HCC), Coronary artery calcification seen on CAT scan, Depression, Essential tremor, Frequent falls, GERD (gastroesophageal reflux disease), Hiatal hernia, Hidradenitis suppurativa, history of alcoholism, Hyperlipidemia, Hypertension, Insomnia, Microcytic anemia, Obstructive sleep apnea, Osteoporosis, Prostate nodule, Seizures (HCC), Tobacco abuse, and Tremor.  Past Medical History:  has a past medical history of Allergic rhintis, Anxiety, Aortic stenosis, Asthma, Atrial fibrillation (HCC), Atrial flutter (HCC), Azoospermia, Bipolar 1 disorder (HCC), Chronic insomnia, Colon polyps, COPD (chronic obstructive pulmonary disease) (HCC), Coronary artery calcification seen on CAT scan, Depression, Essential tremor, Frequent falls, GERD (gastroesophageal reflux disease), Hiatal hernia, Hidradenitis suppurativa, history of alcoholism, Hyperlipidemia, Hypertension, Insomnia, Microcytic anemia, Obstructive sleep apnea, Osteoporosis, Prostate nodule, Seizures (HCC), Tobacco abuse, and Tremor.  Past Surgical History:  has a past surgical history that includes Cataract removal (Bilateral, 2009); Appendectomy (); Cardioversion (x4); Emmanuelle-en-Y Gastric Bypass ();

## 2024-01-30 NOTE — ED PROVIDER NOTES
[facility] EMERGENCY DEPARTMENT  EMERGENCY DEPARTMENT ENCOUNTER        Pt Name: Kar Flanagan  MRN: [id number]  Birthdate 1945  Date of evaluation: 1/29/2024  Provider: Kate Smith PA-C  PCP: Dmitriy Castillo MD  Note Started: 1:43 AM EST 1/30/24       I have seen and evaluated this patient with my supervising physician Derrell Rojas MD.      CHIEF COMPLAINT       Chief Complaint   Patient presents with    Fall     Pt brought in by [facility] from home. Pt had an unwitnessed fall at home. Pt was down for an unknown amount of time. Pt attempted to push his life alert button but the life alert button did not notify EMS. Pt states he has had multiple falls within the last couple of months. Pt c/o discomfort on left side ribs. Pt is on blood thinners.       HISTORY OF PRESENT ILLNESS: 1 or more Elements     History From: Patient  Limitations to history : None    Kar Flanagan is a 78 y.o. male who presents to the emergency department today for evaluation for concerns of a fall.  Patient states that he does live at home alone.  Patient tells me that he attempted to push his life alert button however this did not send the signal.  Patient has had multiple falls within the past couple of months/weeks, is actually admitted on 12/9/2023 and was found to have 3 broken ribs.  Patient tells me that he has fallen within the past several weeks, he states that tonight he states that the lights were off and he was walking back to his bedroom from the bathroom.  He states that he thought that he was next to the bed however he was not so when he went to go put his leg up this caused him to fall onto the ground.  The patient states that he was weak so he states that he was lying on the ground for several hours.  When the patient arrives to the ED he states that he otherwise has no complaints.  He states that he did not hit his head there was no loss of consciousness or vomiting.   dictation.    EKG: When ordered, EKG's are interpreted by the Emergency Department Physician in the absence of a cardiologist.  Please see their note for interpretation of EKG.    RADIOLOGY:   Non-plain film images such as CT, Ultrasound and MRI are read by the radiologist. Plain radiographic images are visualized and preliminarily interpreted by the ED Provider with the below findings:        Interpretation per the Radiologist below, if available at the time of this note:    CT CERVICAL SPINE WO CONTRAST   Preliminary Result   No acute abnormality of the cervical spine.         CT HEAD WO CONTRAST   Preliminary Result   1. No acute intracranial abnormality.   2.  Age related changes including chronic small vessel ischemic disease and   cerebral atrophy.         XR CHEST PORTABLE   Final Result   No acute cardiopulmonary process.           XR CHEST PORTABLE    Result Date: 1/30/2024  EXAMINATION: ONE XRAY VIEW OF THE CHEST 1/30/2024 12:00 am COMPARISON: December 3, 2023. HISTORY: ORDERING SYSTEM PROVIDED HISTORY: fall TECHNOLOGIST PROVIDED HISTORY: Reason for exam:->fall Reason for Exam: Fall FINDINGS: No lines or tubes. Normal cardiomediastinal silhouette. Chronic reticular opacities are noted.  The lungs are otherwise clear without focal consolidation or pleural effusion.  No suspicious pulmonary nodules.  No pulmonary edema. No pneumothorax. No acute osseous abnormality.     No acute cardiopulmonary process.     CT CERVICAL SPINE WO CONTRAST    No acute abnormality of the cervical spine.     CT HEAD WO CONTRAST    1. No acute intracranial abnormality. 2.  Age related changes including chronic small vessel ischemic disease and cerebral atrophy.       No results found.    PROCEDURES   Unless otherwise noted below, none     Procedures    CRITICAL CARE TIME (.cctime)       PAST MEDICAL HISTORY      has a past medical history of Allergic rhintis (1959), Anxiety, Aortic stenosis, Asthma, Atrial fibrillation (HCC),

## 2024-01-30 NOTE — RT PROTOCOL NOTE
RT Inhaler-Nebulizer Bronchodilator Protocol Note    There is a bronchodilator order in the chart from a provider indicating to follow the RT Bronchodilator Protocol and there is an “Initiate RT Inhaler-Nebulizer Bronchodilator Protocol” order as well (see protocol at bottom of note).    CXR Findings:  XR CHEST PORTABLE    Result Date: 1/30/2024  No acute cardiopulmonary process.       The findings from the last RT Protocol Assessment were as follows:   History Pulmonary Disease: Chronic pulmonary disease  Respiratory Pattern: Regular pattern and RR 12-20 bpm  Breath Sounds: Slightly diminished and/or crackles  Cough: Strong, spontaneous, non-productive  Indication for Bronchodilator Therapy: Decreased or absent breath sounds  Bronchodilator Assessment Score: 4    Aerosolized bronchodilator medication orders have been revised according to the RT Inhaler-Nebulizer Bronchodilator Protocol below.    Respiratory Therapist to perform RT Therapy Protocol Assessment initially then follow the protocol.  Repeat RT Therapy Protocol Assessment PRN for score 0-3 or on second treatment, BID, and PRN for scores above 3.    No Indications - adjust the frequency to every 6 hours PRN wheezing or bronchospasm, if no treatments needed after 48 hours then discontinue using Per Protocol order mode.     If indication present, adjust the RT bronchodilator orders based on the Bronchodilator Assessment Score as indicated below.  Use Inhaler orders unless patient has one or more of the following: on home nebulizer, not able to hold breath for 10 seconds, is not alert and oriented, cannot activate and use MDI correctly, or respiratory rate 25 breaths per minute or more, then use the equivalent nebulizer order(s) with same Frequency and PRN reasons based on the score.  If a patient is on this medication at home then do not decrease Frequency below that used at home.    0-3 - enter or revise RT bronchodilator order(s) to equivalent RT

## 2024-01-30 NOTE — PROGRESS NOTES
Pt comfortably resting in bed. VSS. Denies any pain at this moment. Morning meds given per MAR. PWWW. AXOX4. Call light and bedside table in reach. Bed in the lowest position, locked and alarm on. The care plan and education has been reviewed and mutually agreed upon with the patient.

## 2024-01-30 NOTE — PROGRESS NOTES
Pt found getting up on his own and going to the chair from bed. Pt educated on safety precautions and to call staffs for help. Pt verbalized understanding.    1836: pt got up on his own and went from chair to the bed. Chair alarm was alarming and this nurse educated the pt on the use of call light and the importance of calling the staffs when needed. Pt verbalized understanding.

## 2024-01-30 NOTE — PROGRESS NOTES
(07/28/2021).  Discharge Recommendations: Kar Flanagan scored a 19/24 on the AM-PAC short mobility form. Current research shows that an AM-PAC score of 18 or greater is typically associated with a discharge to the patient's home setting. Based on the patient's AM-PAC score and their current functional mobility deficits, it is recommended that the patient have 2-3 sessions per week of Physical Therapy at d/c to increase the patient's independence.  At this time, this patient demonstrates the endurance and safety to discharge home with home services and a follow up treatment frequency of 2-3x/wk.  Please see assessment section for further patient specific details.    If patient discharges prior to next session this note will serve as a discharge summary.  Please see below for the latest assessment towards goals.    HOME HEALTH CARE: LEVEL 3 SAFETY     - Initial home health evaluation to occur within 24-48 hours, in patient home   - Therapy evaluations in home within 24-48 hours of discharge; including DME and home safety   - Frontload therapy 5 days, then 3x a week   - Therapy to evaluate if patient has Home Health Aide needs for personal care   -  evaluation within 24-48 hours, includes evaluation of resources and insurance to determine [place], [place], LTC, and Medicaid options     DME Required For Discharge: patient has all required DME for discharge  Precautions/Restrictions: high fall risk  Weight Bearing Restrictions: no restrictions  Positional Restrictions:no positional restrictions    Pre-Admission Information   Lives With: alone, cat              Type of Home: apartment ([facility] [place])  Home Layout:  2nd floor apt, uses elevator unless there is an emergencya  Home Access: level entry  Bathroom Layout: tub/shower unit  Bathroom Equipment: grab bars in shower, hand held shower head (pt got rid of his shower chair after falling back off of it)  Toilet Height: standard height with riser  Home  Equipment: rollator - 4 wheeled walker, single point cane, alert button  Transfer Assistance: Independent without use of device  Ambulation Assistance:Independent with use of device (4WW), uses scooter at [facility]  ADL Assistance: independent with all ADL's  IADL Assistance: aide helps the pt with cleaning 1 hour per week, pt does his own laundry, pt goes to the grocery with a volunteer on Wednesdays or gives the volunteer his list of groceries needed, pt cooks   Active :        [] Yes                 [x] No   - transportation through [employer]  Hand Dominance: [] Left                 [x] Right  Current Employment: retired.  Occupation: teacher, marketing (lots of international travel)  Hobbies: [facility] [employer] ([employer] and other activities 2x/week)  Recent Falls: Two falls in the last six months, pt stated his legs get \"shaky\"     Comment: Sleeps on flat bed    Examination   Vision:   Vision Corrective Device: wears glasses at all times -glasses are not at the hospital  Hearing:   WFL  Observation:   General Observation:  essential tremors B hands, pt stated his head and neck tremors at times, B calf swelling  Posture:   Good  Sensation:   WFL  Proprioception:    WFL  Tone:   Normotonic  Coordination Testing:   Coordination and Movement Description: (R) UE, (L) UE, tremors    Pt stated his BLEs feel \"shaky\" at times, causing him to sit down to prevent a fall.  No shakiness observed today.  The pt is unsure if this is related to his tremors.    ROM:   (B) LE AROM WFL  Strength:   (L) Hip: 3        (R) Hip: 3  (L) Knee: 4     (R) Knee: 4  (L) Ankle: 4     (R) Ankle: 4  Therapist Clinical Decision Making (Complexity): low complexity  Clinical Presentation: stable      Subjective  General: pt was supine in bed upon PT arrival, agreeable to PT, feels he can discharge home when MD discharges him  Pain: 1/10.  Location: R flank pain  Pain Interventions: not applicable       Functional Mobility  Bed

## 2024-01-30 NOTE — ED PROVIDER NOTES
I personally saw the patient and made/approved the management plan and take responsibility for the patient management.    For further details of Kar Flanagan's emergency department encounter, please see the advanced practice provider's documentation.    I, Derrell Rojas MD, am the primary physician provider of record.    CHIEF COMPLAINT  Chief Complaint   Patient presents with    Fall     Pt brought in by [facility] from home. Pt had an unwitnessed fall at home. Pt was down for an unknown amount of time. Pt attempted to push his life alert button but the life alert button did not notify EMS. Pt states he has had multiple falls within the last couple of months. Pt c/o discomfort on left side ribs. Pt is on blood thinners.     Briefly, Kar Flanagan is a 78 y.o. male  who presents to the ED complaining of unwitnessed fall at home.  Had another event in December similarly where he broke ribs.  He has fallen multiple times since then.  Tonight he fell because the room was dark when he was trying to get back in bed from going to the bathroom.  Fall was mechanical but was on the floor for potentially hours as his LifeAlert did not work and he feels generally weak.  On Eliquis.  Triage note states L sided rib pain but he denies this acutely today to me, states it is just lingering discomfort from his previous fall.    FOCUSED PHYSICAL EXAMINATION  /65   Pulse 74   Temp 97.5 °F (36.4 °C) (Oral)   Resp 16   Ht 1.727 m (5' 8\")   Wt 74.8 kg (165 lb)   SpO2 100%   BMI 25.09 kg/m²    Focused physical examination notable for no acute distress, well-appearing, well-nourished, normal speech and mentation without obvious facial droop, no obvious rash.  No obvious cranial nerve deficits on my initial exam. Dry tacky MM.  NC/AT, no CTL spine ttp, mild L chest wall ttp (pt states this is old/not from today).      EKG performed in ED:  The 12 lead EKG was interpreted by me independent of a

## 2024-01-30 NOTE — H&P
on average, more so during covid   Vaping Use    Vaping Use: Never used   Substance and Sexual Activity    Alcohol use: Not Currently    Drug use: No    Sexual activity: Never   Social History Narrative    Lives alone, senior living complex in [place]    Originally from [place]    Has , doesn't drive     Social Determinants of Health     Financial Resource Strain: Medium Risk (2/25/2022)    Overall Financial Resource Strain (CARDIA)     Difficulty of Paying Living Expenses: Somewhat hard   Food Insecurity: No Food Insecurity (12/4/2023)    Hunger Vital Sign     Worried About Running Out of Food in the Last Year: Never true     Ran Out of Food in the Last Year: Never true   Transportation Needs: No Transportation Needs (12/4/2023)    PRAPARE - Transportation     Lack of Transportation (Medical): No     Lack of Transportation (Non-Medical): No   Physical Activity: Insufficiently Active (8/30/2022)    Exercise Vital Sign     Days of Exercise per Week: 2 days     Minutes of Exercise per Session: 20 min   Intimate Partner Violence: Not At Risk (8/30/2022)    Humiliation, Afraid, Rape, and Kick questionnaire     Fear of Current or Ex-Partner: No     Emotionally Abused: No     Physically Abused: No     Sexually Abused: No   Housing Stability: Low Risk  (12/4/2023)    Housing Stability Vital Sign     Unable to Pay for Housing in the Last Year: No     Number of Places Lived in the Last Year: 1     Unstable Housing in the Last Year: No       Medications:   Medications:    0.9 % sodium chloride  1,000 mL IntraVENous Once    lidocaine  1 patch TransDERmal Once      Infusions:   PRN Meds:      Labs      CBC:   Recent Labs     01/29/24  2322   WBC 10.9   HGB 12.8*        BMP:    Recent Labs     01/29/24  2322      K 3.7      CO2 25   BUN 14   CREATININE 0.6*   GLUCOSE 113*     Hepatic:   Recent Labs     01/29/24  2322   AST 26   ALT 18   BILITOT 0.4   ALKPHOS 205*     Lipids:   Lab Results  the cervical spine.     CT HEAD WO CONTRAST    1. No acute intracranial abnormality. 2.  Age related changes including chronic small vessel ischemic disease and cerebral atrophy.         Electronically signed by CHRISTOPHER Almendarez CNP on 1/30/2024 at 2:46 AM

## 2024-01-30 NOTE — FLOWSHEET NOTE
Orthostatic vitals done. Results given to Felix RN, pt nurse. SBP supine 97, sitting 93, standing 80. Pt asymptomatic during ortho vitals being taken. Pt repositioned in bed and Warm blankets provided. Call light within reach.  Bed in lowest position with appropriate side rails up.   Bed alarm in place. Pt explained importance of bed alarm and agrees not to get oob without nurse present.

## 2024-01-30 NOTE — ED NOTES
2.5 (*)     Albumin/Globulin Ratio 0.8 (*)     Alkaline Phosphatase 205 (*)     All other components within normal limits   CK - Abnormal; Notable for the following components:    Total CK 38 (*)     All other components within normal limits   APTT - Abnormal; Notable for the following components:    aPTT 39.8 (*)     All other components within normal limits     Critical values: no     Abnormal Assessment Findings:     Background  History:   Past Medical History:   Diagnosis Date    Allergic rhintis 1959    Seasonal Hay Fever    Anxiety     Aortic stenosis     echo shows mod stenosis    Asthma     mixed w copd    Atrial fibrillation (HCC)     takes warfarin due to cost, cardioversion 8/4/11, 7/1/11, 2/16/11, 9/23/10    Atrial flutter (HCC)     status post ablation-jack hare    Azoospermia     dx'd in teens    Bipolar 1 disorder (HCC)     Chronic insomnia     Colon polyps     on colonoscopy    COPD (chronic obstructive pulmonary disease) (HCC)     Coronary artery calcification seen on CAT scan     Depression     [facility]    Essential tremor     dr sadler    Frequent falls     GERD (gastroesophageal reflux disease)     weaned off lansoprazole    Hiatal hernia     small, ugi-esophageal dysmotility    Hidradenitis suppurativa     perirectal    history of alcoholism     sober since 2008    Hyperlipidemia     Hypertension     Insomnia     Microcytic anemia 10/14/2010    iron infusions [facility], now resolved, due to malabsorption-dr mix    Obstructive sleep apnea     on bipap 17/11resolved 3 years ago    Osteoporosis 09/2021    dexa    Prostate nodule     dr monet, urology    Seizures ([facility])     1973-One occurance. Unknown etiology. On Dilantin 1 year.    Tobacco abuse     annual chest ct in may    Tremor     primidone as per  neurology       Assessment    Vitals/MEWS: MEWS Score: 0  Level of Consciousness: Alert (0)   Vitals:    01/30/24 0130 01/30/24 0145 01/30/24 0200 01/30/24 0215   BP: 106/63 110/67  99/63 105/61   Pulse: 90 84 86 89   Resp: 13 14 17 15   Temp:       TempSrc:       SpO2: 98% 97% 100% 99%   Weight:       Height:         FiO2 (%):   O2 Flow Rate: O2 Device: None (Room air)    Cardiac Rhythm:    Pain Assessment:  [x] Verbal [] Case Rojas Scale  Pain Scale: Pain Assessment  Pain Assessment: 0-10  Pain Level: 0  Last documented pain score (0-10 scale) Pain Level: 0  Last documented pain medication administered: Lidocaine patch on L ribs  Mental Status: oriented, alert, and coherent  Orientation Level:    NIH Score:    C-SSRS: Risk of Suicide: No Risk  Bedside swallow:    [place] Coma Scale (GCS): Steff Coma Scale  Eye Opening: Spontaneous  Best Verbal Response: Confused  Best Motor Response: Obeys commands  Steff Coma Scale Score: 14  Active LDA's:   Peripheral IV 01/29/24 Left Antecubital (Active)   Site Assessment Clean, dry & intact 01/29/24 2305     PO Status: Regular, push water!!! He does not drink water at home :)  Pertinent or High Risk Medications/Drips: no   If Yes, please provide details:   Pending Blood Product Administration: no       You may also review the ED PT Care Timeline found under the Summary Nursing Index tab.    Recommendation    Pending orders   Plan for Discharge (if known):   Additional Comments:    If any further questions, please call Sending RN at [phone]    Electronically signed by: Electronically signed by Sulema Goldberg RN on 1/30/2024 at 2:47 AM

## 2024-01-31 LAB
ALBUMIN SERPL-MCNC: 2.3 G/DL (ref 3.4–5)
ALP SERPL-CCNC: 185 U/L (ref 40–129)
ALT SERPL-CCNC: 14 U/L (ref 10–40)
ANION GAP SERPL CALCULATED.3IONS-SCNC: 6 MMOL/L (ref 3–16)
AST SERPL-CCNC: 19 U/L (ref 15–37)
BASOPHILS # BLD: 0 K/UL (ref 0–0.2)
BASOPHILS NFR BLD: 0.4 %
BILIRUB DIRECT SERPL-MCNC: <0.2 MG/DL (ref 0–0.3)
BILIRUB INDIRECT SERPL-MCNC: ABNORMAL MG/DL (ref 0–1)
BILIRUB SERPL-MCNC: 0.4 MG/DL (ref 0–1)
BUN SERPL-MCNC: 8 MG/DL (ref 7–20)
CALCIUM SERPL-MCNC: 7.6 MG/DL (ref 8.3–10.6)
CHLORIDE SERPL-SCNC: 105 MMOL/L (ref 99–110)
CO2 SERPL-SCNC: 26 MMOL/L (ref 21–32)
CREAT SERPL-MCNC: <0.5 MG/DL (ref 0.8–1.3)
DEPRECATED RDW RBC AUTO: 16.3 % (ref 12.4–15.4)
EKG ATRIAL RATE: 82 BPM
EKG DIAGNOSIS: NORMAL
EKG P AXIS: 95 DEGREES
EKG P-R INTERVAL: 140 MS
EKG Q-T INTERVAL: 404 MS
EKG QRS DURATION: 128 MS
EKG QTC CALCULATION (BAZETT): 472 MS
EKG R AXIS: -60 DEGREES
EKG T AXIS: 31 DEGREES
EKG VENTRICULAR RATE: 82 BPM
EOSINOPHIL # BLD: 0.3 K/UL (ref 0–0.6)
EOSINOPHIL NFR BLD: 2.9 %
GFR SERPLBLD CREATININE-BSD FMLA CKD-EPI: >60 ML/MIN/{1.73_M2}
GGT SERPL-CCNC: 105 U/L (ref 8–61)
GLUCOSE SERPL-MCNC: 89 MG/DL (ref 70–99)
HCT VFR BLD AUTO: 36.3 % (ref 40.5–52.5)
HGB BLD-MCNC: 12.4 G/DL (ref 13.5–17.5)
LYMPHOCYTES # BLD: 2.1 K/UL (ref 1–5.1)
LYMPHOCYTES NFR BLD: 20.7 %
MCH RBC QN AUTO: 32.3 PG (ref 26–34)
MCHC RBC AUTO-ENTMCNC: 34.3 G/DL (ref 31–36)
MCV RBC AUTO: 94.2 FL (ref 80–100)
MONOCYTES # BLD: 0.9 K/UL (ref 0–1.3)
MONOCYTES NFR BLD: 9.1 %
NEUTROPHILS # BLD: 6.7 K/UL (ref 1.7–7.7)
NEUTROPHILS NFR BLD: 66.9 %
PLATELET # BLD AUTO: 381 K/UL (ref 135–450)
PMV BLD AUTO: 7.3 FL (ref 5–10.5)
POTASSIUM SERPL-SCNC: 4.5 MMOL/L (ref 3.5–5.1)
PROT SERPL-MCNC: 5 G/DL (ref 6.4–8.2)
RBC # BLD AUTO: 3.85 M/UL (ref 4.2–5.9)
SODIUM SERPL-SCNC: 137 MMOL/L (ref 136–145)
WBC # BLD AUTO: 10 K/UL (ref 4–11)

## 2024-01-31 PROCEDURE — 85025 COMPLETE CBC W/AUTO DIFF WBC: CPT

## 2024-01-31 PROCEDURE — 94761 N-INVAS EAR/PLS OXIMETRY MLT: CPT

## 2024-01-31 PROCEDURE — 97116 GAIT TRAINING THERAPY: CPT

## 2024-01-31 PROCEDURE — 36415 COLL VENOUS BLD VENIPUNCTURE: CPT

## 2024-01-31 PROCEDURE — 93010 ELECTROCARDIOGRAM REPORT: CPT | Performed by: INTERNAL MEDICINE

## 2024-01-31 PROCEDURE — 80048 BASIC METABOLIC PNL TOTAL CA: CPT

## 2024-01-31 PROCEDURE — 97530 THERAPEUTIC ACTIVITIES: CPT

## 2024-01-31 PROCEDURE — 80076 HEPATIC FUNCTION PANEL: CPT

## 2024-01-31 PROCEDURE — 82977 ASSAY OF GGT: CPT

## 2024-01-31 PROCEDURE — 6370000000 HC RX 637 (ALT 250 FOR IP): Performed by: NURSE PRACTITIONER

## 2024-01-31 PROCEDURE — 2500000003 HC RX 250 WO HCPCS: Performed by: NURSE PRACTITIONER

## 2024-01-31 PROCEDURE — 2580000003 HC RX 258: Performed by: NURSE PRACTITIONER

## 2024-01-31 PROCEDURE — 1200000000 HC SEMI PRIVATE

## 2024-01-31 PROCEDURE — 94640 AIRWAY INHALATION TREATMENT: CPT

## 2024-01-31 RX ADMIN — MONTELUKAST SODIUM 10 MG: 10 TABLET, FILM COATED ORAL at 20:53

## 2024-01-31 RX ADMIN — PRIMIDONE 250 MG: 250 TABLET ORAL at 12:40

## 2024-01-31 RX ADMIN — Medication 2 PUFF: at 06:24

## 2024-01-31 RX ADMIN — GUAIFENESIN 200 MG: 100 SOLUTION ORAL at 22:45

## 2024-01-31 RX ADMIN — GUAIFENESIN 200 MG: 100 SOLUTION ORAL at 15:06

## 2024-01-31 RX ADMIN — MELATONIN TAB 3 MG 19.5 MG: 3 TAB at 20:53

## 2024-01-31 RX ADMIN — HYDROXYZINE PAMOATE 100 MG: 25 CAPSULE ORAL at 14:56

## 2024-01-31 RX ADMIN — ATORVASTATIN CALCIUM 40 MG: 40 TABLET, FILM COATED ORAL at 07:51

## 2024-01-31 RX ADMIN — PRIMIDONE 250 MG: 250 TABLET ORAL at 20:53

## 2024-01-31 RX ADMIN — PRIMIDONE 250 MG: 250 TABLET ORAL at 07:51

## 2024-01-31 RX ADMIN — APIXABAN 5 MG: 5 TABLET, FILM COATED ORAL at 07:51

## 2024-01-31 RX ADMIN — Medication 10 ML: at 20:54

## 2024-01-31 RX ADMIN — FLUOXETINE HYDROCHLORIDE 60 MG: 20 CAPSULE ORAL at 07:51

## 2024-01-31 RX ADMIN — GUAIFENESIN 200 MG: 100 SOLUTION ORAL at 08:07

## 2024-01-31 RX ADMIN — APIXABAN 5 MG: 5 TABLET, FILM COATED ORAL at 20:53

## 2024-01-31 RX ADMIN — Medication 10 ML: at 07:51

## 2024-01-31 ASSESSMENT — PAIN SCALES - GENERAL: PAINLEVEL_OUTOF10: 0

## 2024-01-31 NOTE — PLAN OF CARE
Problem: Discharge Planning  Goal: Discharge to home or other facility with appropriate resources  1/31/2024 0742 by Miah Blancas, RN  Outcome: Progressing     Problem: Pain  Goal: Verbalizes/displays adequate comfort level or baseline comfort level  1/31/2024 0742 by Miah Blancas, RN  Outcome: Progressing     Problem: Skin/Tissue Integrity  Goal: Absence of new skin breakdown  Description: 1.  Monitor for areas of redness and/or skin breakdown  2.  Assess vascular access sites hourly  3.  Every 4-6 hours minimum:  Change oxygen saturation probe site  4.  Every 4-6 hours:  If on nasal continuous positive airway pressure, respiratory therapy assess nares and determine need for appliance change or resting period.  1/31/2024 0742 by Miah Blancas, RN  Outcome: Progressing     Problem: ABCDS Injury Assessment  Goal: Absence of physical injury  1/31/2024 0742 by iMah Blancas, RN  Outcome: Progressing

## 2024-01-31 NOTE — PLAN OF CARE
Patient up to bathroom throughout the night. No c/o pain/discomfort. Call light in reach.   Problem: Discharge Planning  Goal: Discharge to home or other facility with appropriate resources  Outcome: Progressing     Problem: Pain  Goal: Verbalizes/displays adequate comfort level or baseline comfort level  Outcome: Progressing     Problem: Skin/Tissue Integrity  Goal: Absence of new skin breakdown  Description: 1.  Monitor for areas of redness and/or skin breakdown  2.  Assess vascular access sites hourly  3.  Every 4-6 hours minimum:  Change oxygen saturation probe site  4.  Every 4-6 hours:  If on nasal continuous positive airway pressure, respiratory therapy assess nares and determine need for appliance change or resting period.  Outcome: Progressing     Problem: Safety - Adult  Goal: Free from fall injury  Outcome: Progressing     Problem: ABCDS Injury Assessment  Goal: Absence of physical injury  Outcome: Progressing

## 2024-01-31 NOTE — PROGRESS NOTES
HOSPITALISTS PROGRESS NOTE    1/31/2024 8:19 AM        Name: Kar Flanagan .              Admitted: 1/29/2024  Primary Care Provider: Dmitriy Castillo MD (Tel: [phone])      Brief Course: This 78-year-old male with PMHx COPD, smoking abuse, CHF, A-fib; on Eliquis, chronic insomnia and major depressive disorder who presented after syncope and fall at home    Interval history:   Pt seen and examined today   Overnight events noted and interval ancillary notes reviewed.  Orthostatic vitals positive.  Echo pending  Reported  feeling little better today.  Continues to have dizziness      Assessment & Plan:     Syncope and fall; likely secondary generalized weakness/orthostatic hypotension  was on the floor for unknown period of time.  Denies head trauma or LOS  multiple falls in past couple of months; recently admitted on 12/9/23 and was noted to have 3 broken ribs  CT head, cervical spine negative acute pathology.  CK WNL.  UA negative  Orthostatic vitals positive.  Hs troponin negative.  EKG negative for acute ischemic changes echo pending  PT/OT following. Maintain fall precautions.    Generalized weakness; check TSH, MD, vitamin B12 and folic acid     Elevated alk phos at 205 on admission; down to 185.  Check GGT.  Denies alcohol use.     Hx of COPD/asthma.  Not in acute exacerbation.  Continue home inhalers and prn breathing treatment     Hx of A-fib.-Rate controlled.  Continue Eliquis.  Monitor on telemetry     Bipolar depression: Continue Prozac     Hx of nicotine dependence.  Reports smoking 3 packs/day since teen years.    Counseled on smoking cessation and nicotine patch ordered but patient refused.    Hx of recent rib fractures-lidocaine patch.      DVT PPX: Eliquis  Code:Full Code    Disposition: Once acute medical issues have resolved    Current Medications  albuterol sulfate HFA (PROVENTIL;VENTOLIN;PROAIR) 108 (90 Base)  Abdomen soft, no tenderness, not distended, normal bowel sounds  Musculoskeletal: No cyanosis in digits.  No BLE edema present  Neurology: CN 2-12 grossly intact. No speech or motor deficits  Psych: Not agitated, appropriate affect  Skin: Warm, dry, normal turgor    Labs and Tests:  CBC:   Recent Labs     01/29/24 2322 01/31/24  0545   WBC 10.9 10.0   HGB 12.8* 12.4*    381     BMP:    Recent Labs     01/29/24 2322 01/31/24  0545    137   K 3.7 4.5    105   CO2 25 26   BUN 14 8   CREATININE 0.6* <0.5*   GLUCOSE 113* 89     Hepatic:   Recent Labs     01/29/24 2322 01/31/24  0545   AST 26 19   ALT 18 14   BILITOT 0.4 0.4   ALKPHOS 205* 185*     CT CERVICAL SPINE WO CONTRAST   Preliminary Result   No acute abnormality of the cervical spine.         CT HEAD WO CONTRAST   Preliminary Result   1. No acute intracranial abnormality.   2. Age related changes including chronic small vessel ischemic disease and   cerebral atrophy.         XR CHEST PORTABLE   Final Result   No acute cardiopulmonary process.             Problem List  Principal Problem:    Fall on same level from slipping, tripping or stumbling, initial encounter  Resolved Problems:    * No resolved hospital problems. *       DERIC MARMOLEJO MD   1/31/2024 8:19 AM      Please note that some part of this chart was generated using Dragon dictation software. Although every effort was made to ensure the accuracy of this automated transcription, some errors in transcription may have occurred inadvertently. If you may need any clarification, please do not hesitate to contact me through EPIC.

## 2024-01-31 NOTE — PROGRESS NOTES
Nutrition Note    RECOMMENDATIONS  PO Diet: Continue current diet  ONS: Pt does not wish to receive     NUTRITION ASSESSMENT   Pt triggered for positive nutrition screen indicating unintentional wt loss of \"60 lb over the last 2 months\". However, upon visiting, pt reported wt loss has been intentional as he has been fasting and that he went from 190 to 174 lb in 3 weeks. If accurate, wt hx in EMR indicates wt loss of 13 lb (7.3%) from January to December 2023, which is not considered significant, and no significant wt changes since December. Does not wish to receive ONS, dislikes. RD will monitor for adequate po intake.     Nutrition Related Findings: Labs reviewed. LBM 1/30. +3 BLE edema.  Wounds: None  Nutrition Education:  Education not indicated   Nutrition Goals: PO intake 75% or greater, by next RD assessment     MALNUTRITION ASSESSMENT   Acute Illness  Malnutrition Status: No malnutrition    NUTRITION DIAGNOSIS   No nutrition diagnosis at this time    CURRENT NUTRITION THERAPIES  ADULT DIET; Regular     PO Intake: 51-75%, %   PO Supplement Intake:None Ordered    ANTHROPOMETRICS  Current Height: 172.7 cm (5' 8\")  Current Weight - Scale: 75.4 kg (166 lb 4.8 oz)    Admission weight: 73.9 kg (163 lb)  Ideal Body Weight (IBW): 154 lbs  (70 kg)        BMI: 25.2    COMPARATIVE STANDARDS  Total Energy Requirements (kcals/day): 9178-3325     Protein (g):         Fluid (mL/day):  0103-0395    The patient will be monitored per nutrition standards of care. Consult dietitian if additional nutrition interventions are needed prior to RD reassessment.     Denise Burton, MS, RD, LD    Contact: [phone]

## 2024-01-31 NOTE — PROGRESS NOTES
Pt's neighbor Amena Warren called to check on pt. Pt gave this RN verbal consent to let Mrs. Warren know that current plan is anticipating d/c tomorrow.     Mckenzie Hubbard RN  1/31/2024

## 2024-01-31 NOTE — PROGRESS NOTES
[facility] - Inpatient Rehabilitation Department   Phone: [phone]    Physical Therapy    [] Initial Evaluation            [x] Daily Treatment Note         [] Discharge Summary      Patient: Kar Flanagan   : 1945   MRN: [id number]   Date of Service:  2024  Admitting Diagnosis: Fall on same level from slipping, tripping or stumbling, initial encounter  Current Admission Summary: The patient was admitted s/p a fall at home.   Past Medical History:  has a past medical history of Allergic rhintis, Anxiety, Aortic stenosis, Asthma, Atrial fibrillation (HCC), Atrial flutter (HCC), Azoospermia, Bipolar 1 disorder (HCC), Chronic insomnia, Colon polyps, COPD (chronic obstructive pulmonary disease) (HCC), Coronary artery calcification seen on CAT scan, Depression, Essential tremor, Frequent falls, GERD (gastroesophageal reflux disease), Hiatal hernia, Hidradenitis suppurativa, history of alcoholism, Hyperlipidemia, Hypertension, Insomnia, Microcytic anemia, Obstructive sleep apnea, Osteoporosis, Prostate nodule, Seizures (HCC), Tobacco abuse, and Tremor.  Past Surgical History:  has a past surgical history that includes Cataract removal (Bilateral, ); Appendectomy (); Cardioversion (x4); Emmanuelle-en-Y Gastric Bypass (); Colonoscopy (10/01/2009); cyst removal; Atrial ablation surgery (2011); Atrial ablation surgery (2011); Upper gastrointestinal endoscopy (2013); Bariatric Surgery (2013); hernia repair; Atrial ablation surgery (2015); other surgical history (2015); Hemorrhoid surgery (2016); Abdomen surgery; Cholecystectomy, laparoscopic (N/A, 2016); Colonoscopy (N/A, 2020); Upper gastrointestinal endoscopy (N/A, 2020); Upper gastrointestinal endoscopy (N/A, 10/02/2020); Prostate biopsy (2021); Upper gastrointestinal endoscopy (2021); Colonoscopy (N/A, 2021); and Upper gastrointestinal endoscopy  Equipment: rollator - 4 wheeled walker, single point cane, alert button  Transfer Assistance: Independent without use of device  Ambulation Assistance:Independent with use of device (4WW), uses scooter at [facility]  ADL Assistance: independent with all ADL's  IADL Assistance: aide helps the pt with cleaning 1 hour per week, pt does his own laundry, pt goes to the grocery with a volunteer on Wednesdays or gives the volunteer his list of groceries needed, pt cooks   Active :        [] Yes                 [x] No   - transportation through [employer]  Hand Dominance: [] Left                 [x] Right  Current Employment: retired.  Occupation: teacher, marketing (lots of international travel)  Hobbies: [facility] [employer] [place] [employer] ([employer] and other activities 2x/week)  Recent Falls: Two falls in the last six months, pt stated his legs get \"shaky\"     Comment: Sleeps on flat bed    Examination   Vision:   Vision Corrective Device: wears glasses at all times -glasses are not at the hospital  Hearing:   WFL  Observation:   General Observation:  essential tremors B hands, pt stated his head and neck tremors at times, B calf swelling  Posture:   Good        Subjective  General: Pt sitting on commode upon PTA arrival, agreeable to PT, feels he can discharge home and utilize advice and [place] for increased staff/therapy at home. Pt reports regular incontinence of the bowels at home which causes him to fall.  Pain: 0/10 Reports weakness in both legs with activity.  Pain Interventions: not applicable       Functional Mobility  Bed Mobility:  Supine to Sit: supervision  Scooting: supervision  Comments:  Transfers:  Sit to stand transfer: stand by assistance  Stand to sit transfer: stand by assistance  Comments: decreased eccentric control, VC for hand placement 3x  Ambulation:  Assistive Device: rolling walker  Assistance: stand by assistance  Distance: 45'  Gait Mechanics: slow brittany, step through gait pattern, small step

## 2024-01-31 NOTE — CARE COORDINATION
Case Management Assessment  Initial Evaluation    Date/Time of Evaluation: 1/31/2024 1:34 PM  Assessment Completed by: JER Snowden    If patient is discharged prior to next notation, then this note serves as note for discharge by case management.    Patient Name: Kar Flanagan                   YOB: 1945  Diagnosis: General weakness [R53.1]  Anticoagulated [Z79.01]  Fall on same level from slipping, tripping or stumbling, initial encounter [W01.0XXA]                   Date / Time: 1/29/2024 10:48 PM    Patient Admission Status: Inpatient   Readmission Risk (Low < 19, Mod (19-27), High > 27): Readmission Risk Score: 16.6    Current PCP: Dmitriy Castillo MD  PCP verified by CM? Yes    Chart Reviewed: Yes      History Provided by: Patient  Patient Orientation: Alert and Oriented    Patient Cognition: Alert    Hospitalization in the last 30 days (Readmission):  No    If yes, Readmission Assessment in CM Navigator will be completed.    Advance Directives:      Code Status: Full Code   Patient's Primary Decision Maker is: Named in Scanned ACP Document    Primary Decision Maker: Erica Wiggins - Other - [phone]    Secondary Decision Maker: Benji Juares - Other - [phone]    Discharge Planning:    Patient lives with: Alone Type of Home: Apartment (Lives at [facility])  Primary Care Giver: Self  Patient Support Systems include: Friends/Neighbors   Current Financial resources: Medicare  Current community resources: Other (Comment) (Active with [employer])  Current services prior to admission: Durable Medical Equipment, Emergency Call  System, [employer]/Passport, Home Care            Current DME: Walker, Cane            Type of Home Care services:  None    ADLS  Prior functional level: Assistance with the following:, Housework  Current functional level: Assistance with the following:, Housework, Mobility    PT AM-PAC: 18 /24  OT AM-PAC: 21 /24    Family can provide assistance at  DC: Yes  Would you like Case Management to discuss the discharge plan with any other family members/significant others, and if so, who? Yes  Plans to Return to Present Housing: Yes  Other Identified Issues/Barriers to RETURNING to current housing:   Potential Assistance needed at discharge: Home Care            Potential DME:    Patient expects to discharge to: Apartment  Plan for transportation at discharge: Other (see comment) (Will need trasnport home at discharge.)    Financial    Payor: [employer]ET[employer] MEDICARE / Plan: AET[employer] MEDICARE ADVANTAGE HMO / Product Type: Medicare /     Does insurance require precert for SNF: Yes    Potential assistance Purchasing Medications: No  Meds-to-Beds request:        [id number] [place], [place] - [address] - P [phone] - F [phone] [address] [address]  Phone: [phone] Fax: [phone]    [id number] [address] - P [phone] - F [phone]  [address] [address]  Phone: [phone] Fax: [phone]      Notes:    Factors facilitating achievement of predicted outcomes: Caregiver support, Friend support, Motivated, Cooperative, and Pleasant    Barriers to discharge: Medical complications    Additional Case Management Notes: The SW spoke with the pt . The pt lives in  Senior Living apartment [facility]. The pt states he is usually independent at baseline but has a aide that comes once a week for one hour that helps with Cleaning and washing his clothes. The pt states he is active with [employer] and receives Meals on Wheels, Transport services and aide.     The pt also stated he is part of an organization called [employer] where they do outings and various activities.     PT/OT worked with the pt and recommended the pt return home with [facility]. The pt has no preference of [facility]. A referral has been sent to [facility]. They

## 2024-01-31 NOTE — PROGRESS NOTES
Pt inquired about plan and disposition. Per pt, rounding MD was waiting on echo that was ordered this AM. Called Echo, no answer so presume staff gone for day, but this RN left message. Updated Dr. Brian, who stated that pt would be staying until echo completed. Pt updated. MD also ordered for repeat orthostatics to be completed.    Mckenzie Hubbard RN  1/31/2024

## 2024-02-01 VITALS
SYSTOLIC BLOOD PRESSURE: 130 MMHG | HEIGHT: 68 IN | DIASTOLIC BLOOD PRESSURE: 75 MMHG | HEART RATE: 78 BPM | WEIGHT: 166.3 LBS | OXYGEN SATURATION: 98 % | RESPIRATION RATE: 18 BRPM | TEMPERATURE: 98 F | BODY MASS INDEX: 25.2 KG/M2

## 2024-02-01 LAB
25(OH)D3 SERPL-MCNC: 56.9 NG/ML
ANION GAP SERPL CALCULATED.3IONS-SCNC: 10 MMOL/L (ref 3–16)
BASOPHILS # BLD: 0.1 K/UL (ref 0–0.2)
BASOPHILS NFR BLD: 0.7 %
BUN SERPL-MCNC: 8 MG/DL (ref 7–20)
CALCIUM SERPL-MCNC: 8.2 MG/DL (ref 8.3–10.6)
CHLORIDE SERPL-SCNC: 102 MMOL/L (ref 99–110)
CO2 SERPL-SCNC: 26 MMOL/L (ref 21–32)
CREAT SERPL-MCNC: <0.5 MG/DL (ref 0.8–1.3)
DEPRECATED RDW RBC AUTO: 15.8 % (ref 12.4–15.4)
DEPRECATED RDW RBC AUTO: 16.3 % (ref 12.4–15.4)
EOSINOPHIL # BLD: 0.2 K/UL (ref 0–0.6)
EOSINOPHIL NFR BLD: 1.5 %
GFR SERPLBLD CREATININE-BSD FMLA CKD-EPI: >60 ML/MIN/{1.73_M2}
GLUCOSE SERPL-MCNC: 88 MG/DL (ref 70–99)
HCT VFR BLD AUTO: 39.6 % (ref 40.5–52.5)
HCT VFR BLD AUTO: 40.8 % (ref 40.5–52.5)
HGB BLD-MCNC: 13.4 G/DL (ref 13.5–17.5)
HGB BLD-MCNC: 14 G/DL (ref 13.5–17.5)
LYMPHOCYTES # BLD: 2.7 K/UL (ref 1–5.1)
LYMPHOCYTES NFR BLD: 17 %
MAGNESIUM SERPL-MCNC: 1.7 MG/DL (ref 1.8–2.4)
MCH RBC QN AUTO: 31.5 PG (ref 26–34)
MCH RBC QN AUTO: 32 PG (ref 26–34)
MCHC RBC AUTO-ENTMCNC: 33.8 G/DL (ref 31–36)
MCHC RBC AUTO-ENTMCNC: 34.3 G/DL (ref 31–36)
MCV RBC AUTO: 93.2 FL (ref 80–100)
MCV RBC AUTO: 93.3 FL (ref 80–100)
MONOCYTES # BLD: 1.2 K/UL (ref 0–1.3)
MONOCYTES NFR BLD: 7.7 %
NEUTROPHILS # BLD: 11.6 K/UL (ref 1.7–7.7)
NEUTROPHILS NFR BLD: 73.1 %
PLATELET # BLD AUTO: 419 K/UL (ref 135–450)
PLATELET # BLD AUTO: 447 K/UL (ref 135–450)
PMV BLD AUTO: 7.3 FL (ref 5–10.5)
PMV BLD AUTO: 7.5 FL (ref 5–10.5)
POTASSIUM SERPL-SCNC: 3.5 MMOL/L (ref 3.5–5.1)
PROCALCITONIN SERPL IA-MCNC: 0.08 NG/ML (ref 0–0.15)
RBC # BLD AUTO: 4.24 M/UL (ref 4.2–5.9)
RBC # BLD AUTO: 4.38 M/UL (ref 4.2–5.9)
SODIUM SERPL-SCNC: 138 MMOL/L (ref 136–145)
WBC # BLD AUTO: 12.9 K/UL (ref 4–11)
WBC # BLD AUTO: 15.8 K/UL (ref 4–11)

## 2024-02-01 PROCEDURE — 84145 PROCALCITONIN (PCT): CPT

## 2024-02-01 PROCEDURE — 94669 MECHANICAL CHEST WALL OSCILL: CPT

## 2024-02-01 PROCEDURE — 97535 SELF CARE MNGMENT TRAINING: CPT

## 2024-02-01 PROCEDURE — 93306 TTE W/DOPPLER COMPLETE: CPT

## 2024-02-01 PROCEDURE — 97530 THERAPEUTIC ACTIVITIES: CPT

## 2024-02-01 PROCEDURE — 36415 COLL VENOUS BLD VENIPUNCTURE: CPT

## 2024-02-01 PROCEDURE — 6360000002 HC RX W HCPCS: Performed by: INTERNAL MEDICINE

## 2024-02-01 PROCEDURE — 94640 AIRWAY INHALATION TREATMENT: CPT

## 2024-02-01 PROCEDURE — 80048 BASIC METABOLIC PNL TOTAL CA: CPT

## 2024-02-01 PROCEDURE — 83735 ASSAY OF MAGNESIUM: CPT

## 2024-02-01 PROCEDURE — 6370000000 HC RX 637 (ALT 250 FOR IP): Performed by: NURSE PRACTITIONER

## 2024-02-01 PROCEDURE — 94761 N-INVAS EAR/PLS OXIMETRY MLT: CPT

## 2024-02-01 PROCEDURE — 2580000003 HC RX 258: Performed by: NURSE PRACTITIONER

## 2024-02-01 PROCEDURE — 85027 COMPLETE CBC AUTOMATED: CPT

## 2024-02-01 PROCEDURE — 2500000003 HC RX 250 WO HCPCS: Performed by: NURSE PRACTITIONER

## 2024-02-01 PROCEDURE — 85025 COMPLETE CBC W/AUTO DIFF WBC: CPT

## 2024-02-01 PROCEDURE — 82306 VITAMIN D 25 HYDROXY: CPT

## 2024-02-01 RX ORDER — ALBUTEROL SULFATE 2.5 MG/3ML
2.5 SOLUTION RESPIRATORY (INHALATION)
Status: DISCONTINUED | OUTPATIENT
Start: 2024-02-01 | End: 2024-02-01 | Stop reason: HOSPADM

## 2024-02-01 RX ORDER — MAGNESIUM SULFATE IN WATER 40 MG/ML
4000 INJECTION, SOLUTION INTRAVENOUS ONCE
Status: COMPLETED | OUTPATIENT
Start: 2024-02-01 | End: 2024-02-01

## 2024-02-01 RX ADMIN — MAGNESIUM SULFATE HEPTAHYDRATE 4000 MG: 40 INJECTION, SOLUTION INTRAVENOUS at 10:50

## 2024-02-01 RX ADMIN — PRIMIDONE 250 MG: 250 TABLET ORAL at 14:53

## 2024-02-01 RX ADMIN — PRIMIDONE 250 MG: 250 TABLET ORAL at 08:13

## 2024-02-01 RX ADMIN — Medication 2 PUFF: at 00:09

## 2024-02-01 RX ADMIN — ACETAMINOPHEN 650 MG: 325 TABLET ORAL at 04:58

## 2024-02-01 RX ADMIN — APIXABAN 5 MG: 5 TABLET, FILM COATED ORAL at 08:13

## 2024-02-01 RX ADMIN — ALBUTEROL SULFATE 2.5 MG: 2.5 SOLUTION RESPIRATORY (INHALATION) at 12:35

## 2024-02-01 RX ADMIN — ALBUTEROL SULFATE 2.5 MG: 2.5 SOLUTION RESPIRATORY (INHALATION) at 15:52

## 2024-02-01 RX ADMIN — ALBUTEROL SULFATE 2.5 MG: 2.5 SOLUTION RESPIRATORY (INHALATION) at 20:38

## 2024-02-01 RX ADMIN — Medication 10 ML: at 08:14

## 2024-02-01 RX ADMIN — HYDROXYZINE PAMOATE 100 MG: 25 CAPSULE ORAL at 03:05

## 2024-02-01 RX ADMIN — ALBUTEROL SULFATE 2.5 MG: 2.5 SOLUTION RESPIRATORY (INHALATION) at 09:21

## 2024-02-01 RX ADMIN — GUAIFENESIN 200 MG: 100 SOLUTION ORAL at 08:42

## 2024-02-01 RX ADMIN — ATORVASTATIN CALCIUM 40 MG: 40 TABLET, FILM COATED ORAL at 08:13

## 2024-02-01 RX ADMIN — FLUOXETINE HYDROCHLORIDE 60 MG: 20 CAPSULE ORAL at 08:13

## 2024-02-01 ASSESSMENT — PAIN SCALES - GENERAL: PAINLEVEL_OUTOF10: 0

## 2024-02-01 NOTE — PLAN OF CARE
Problem: Discharge Planning  Goal: Discharge to home or other facility with appropriate resources  2/1/2024 0746 by Miah Blancas, RN  Outcome: Progressing     Problem: Skin/Tissue Integrity  Goal: Absence of new skin breakdown  Description: 1.  Monitor for areas of redness and/or skin breakdown  2.  Assess vascular access sites hourly  3.  Every 4-6 hours minimum:  Change oxygen saturation probe site  4.  Every 4-6 hours:  If on nasal continuous positive airway pressure, respiratory therapy assess nares and determine need for appliance change or resting period.  2/1/2024 0746 by Miah Blancas, RN  Outcome: Progressing     Problem: Safety - Adult  Goal: Free from fall injury  2/1/2024 0746 by Miah Blancas, RN  Outcome: Progressing

## 2024-02-01 NOTE — RT PROTOCOL NOTE
RT Inhaler-Nebulizer Bronchodilator Protocol Note    There is a bronchodilator order in the chart from a provider indicating to follow the RT Bronchodilator Protocol and there is an “Initiate RT Inhaler-Nebulizer Bronchodilator Protocol” order as well (see protocol at bottom of note).    CXR Findings:  No results found.    The findings from the last RT Protocol Assessment were as follows:   History Pulmonary Disease: Chronic pulmonary disease  Respiratory Pattern: Dyspnea on exertion or RR 21-25 bpm  Breath Sounds: Intermittent or unilateral wheezes  Cough: Weak, non-productive  Indication for Bronchodilator Therapy: Decreased or absent breath sounds  Bronchodilator Assessment Score: 11    Aerosolized bronchodilator medication orders have been revised according to the RT Inhaler-Nebulizer Bronchodilator Protocol below.    Respiratory Therapist to perform RT Therapy Protocol Assessment initially then follow the protocol.  Repeat RT Therapy Protocol Assessment PRN for score 0-3 or on second treatment, BID, and PRN for scores above 3.    No Indications - adjust the frequency to every 6 hours PRN wheezing or bronchospasm, if no treatments needed after 48 hours then discontinue using Per Protocol order mode.     If indication present, adjust the RT bronchodilator orders based on the Bronchodilator Assessment Score as indicated below.  Use Inhaler orders unless patient has one or more of the following: on home nebulizer, not able to hold breath for 10 seconds, is not alert and oriented, cannot activate and use MDI correctly, or respiratory rate 25 breaths per minute or more, then use the equivalent nebulizer order(s) with same Frequency and PRN reasons based on the score.  If a patient is on this medication at home then do not decrease Frequency below that used at home.    0-3 - enter or revise RT bronchodilator order(s) to equivalent RT Bronchodilator order with Frequency of every 4 hours PRN for wheezing or increased

## 2024-02-01 NOTE — CARE COORDINATION
02/01/24 1508   IMM Letter   IMM Letter given to Patient/Family/Significant other/Guardian/POA/by: CM provided copy of IMM. Patient is aware of discharge and agreeable.   IMM Letter date given: 02/01/24   IMM Letter time given: 1421     Electronically signed by Alycia Germain on 2/1/2024 at 3:08 PM

## 2024-02-01 NOTE — CARE COORDINATION
Case Management -  Discharge Note      Patient Name: Kar Flanagan                   YOB: 1945            Readmission Risk (Low < 19, Mod (19-27), High > 27): Readmission Risk Score: 15.5    Current PCP: Dmitriy Castillo MD    ([facility]) Important Message from Medicare:    Date: 2/1/2024    PT AM-PAC: 18 /24  OT AM-PAC: 21 /24    Patient/patient representative has been educated on the benefits of HHC as well as the possible risks of declining recommended services. Patient/patient representative has acknowledged the information provided and decided on the following discharge plan. Patient/ patient representative has been provided freedom of choice regarding service provider, supported by basic dialogue that supports the patient's individualized plan of care/goals.    Home Care Information:   Is patient resuming current home health care services: Yes -      Home Care Agency:   [facility] -[address], [address]  [place] [address]  Phone: [phone]  Fax: [phone]               Services: RN PT/OT   Home Health Order Obtained:   Certification and medical necessity: I certify that, based on my findings, the following services are medically necessary home health services for Kar Flanagan for the following reasons: This 78-year-old male with PMHx COPD, smoking abuse, CHF, A-fib; on Eliquis, chronic insomnia and major depressive disorder   - Vital signs monitoring: Nurse to perform BP, HR, RR, Temp, and Weight with each visit and teach patient and caregivers on taking daily.  Nurse to call physician if pulse less than 50 or greater than 120, respiratory rate less than 12 or greater than 25, oral temperature greater than or equal to 101 oF, systolic BP less than 90 or greater than 170, diastolic BP less than 50 or greater than 100.  - Consult Physical Therapy for  Evaluate and Treat  - Consult Occupational Therapy for  Evaluate and Treat       Home health agency

## 2024-02-01 NOTE — DISCHARGE INSTR - COC
status Z98.84    Obesity (BMI 30.0-34.9) E66.9    Tobacco abuse Z72.0    Coronary artery calcification seen on CAT scan I25.10    Essential tremor G25.0    Allergic rhintis T78.40XA    Anxiety F41.9    Aortic stenosis I35.0    Closed fracture of multiple ribs of right side, initial encounter S22.41XA    Fall on same level from slipping, tripping or stumbling, initial encounter W01.0XXA       Isolation/Infection:   Isolation            No Isolation          Patient Infection Status       None to display                     Nurse Assessment:  Last Vital Signs: /78   Pulse 69   Temp 97.8 °F (36.6 °C) (Oral)   Resp 18   Ht 1.727 m (5' 8\")   Wt 75.4 kg (166 lb 4.8 oz)   SpO2 96%   BMI 25.29 kg/m²     Last documented pain score (0-10 scale): Pain Level: 0  Last Weight:   Wt Readings from Last 1 Encounters:   02/01/24 75.4 kg (166 lb 4.8 oz)     Mental Status:  {IP PT MENTAL STATUS:20030}    IV Access:  - None    Nursing Mobility/ADLs:  Walking   Independent  Transfer  Independent  Bathing  Independent  Dressing  Independent  Toileting  Independent  Feeding  Independent  Med Admin  Independent  Med Delivery   whole    Wound Care Documentation and Therapy:  Incision 08/05/11 Groin Right (Active)   Number of days: 4562       Incision 09/01/11 Groin Right (Active)   Number of days: 4535       Incision 09/01/11 Groin Left (Active)   Number of days: 4535       Incision 09/21/15 Rectum (Active)   Number of days: 3055       Incision 03/23/16 Other (Comment) (Active)   Number of days: 2871       Incision 06/20/16 Abdomen (Active)   Number of days: 2782        Elimination:  Continence:   Bowel: Yes  Bladder: Yes  Urinary Catheter: None   Colostomy/Ileostomy/Ileal Conduit: No       Date of Last BM: 2/1    Intake/Output Summary (Last 24 hours) at 2/1/2024 1356  Last data filed at 2/1/2024 0749  Gross per 24 hour   Intake 250 ml   Output --   Net 250 ml     I/O last 3 completed shifts:  In: 490 [P.O.:480; I.V.:10]  Out: -      Safety Concerns:     { LEANDRO Safety Concerns:082566803}    Impairments/Disabilities:      Vision    Nutrition Therapy:  Current Nutrition Therapy:   - Oral Diet:  General    Routes of Feeding: Oral  Liquids: No Restrictions  Daily Fluid Restriction: no  Last Modified Barium Swallow with Video (Video Swallowing Test): not done    Treatments at the Time of Hospital Discharge:   Respiratory Treatments:   Oxygen Therapy:  is not on home oxygen therapy.  Ventilator:    - No ventilator support    Rehab Therapies: {THERAPEUTIC INTERVENTION:6828127769}  Weight Bearing Status/Restrictions: {[facility] Weight Bearin}  Other Medical Equipment (for information only, NOT a DME order):  {EQUIPMENT:889182229}  Other Treatments: ***    Patient's personal belongings (please select all that are sent with patient):  {CHP DME Belongings:063353909}    RN SIGNATURE:  {Esignature:074636136}    CASE MANAGEMENT/SOCIAL WORK SECTION    Inpatient Status Date:     Readmission Risk Assessment Score:16%  Readmission Risk              Risk of Unplanned Readmission:  20           Discharging to Facility/ Agency     [address], [address]  [place] [address]  Phone: [phone]  Fax: [phone]       Dialysis Facility (if applicable)   Name:  Address:  Dialysis Schedule:  Phone:  Fax:    / signature: Electronically signed by Alycia Germain on 24 at 2:43 PM EST    PHYSICIAN SECTION    Prognosis: Fair    Condition at Discharge: Stable    Rehab Potential (if transferring to Rehab): Fair    Recommended Labs or Other Treatments After Discharge:    Physician Certification: I certify the above information and transfer of Kar Flanagan  is necessary for the continuing treatment of the diagnosis listed and that he requires Home for less 30 days.     Update Admission H&P: No change in H&P    PHYSICIAN SIGNATURE:  Electronically signed by DERIC MARMOLEJO MD on

## 2024-02-01 NOTE — PROGRESS NOTES
restrictions  [] Right Upper Extremity  [] Left Upper Extremity [] Right Lower Extremity  [] Left Lower Extremity     Required Braces/Orthotics: no braces required   [] Right  [] Left  Positional Restrictions:no positional restrictions        Pre-Admission Information   Lives With: alone, cat              Type of Home: apartment ([facility] [place])  Home Layout:  2nd floor apt, uses elevator unless there is an emergency  Home Access: level entry  Bathroom Layout: tub/shower unit  Bathroom Equipment: grab bars in shower, hand held shower head (pt got rid of his shower chair after falling back off of it)  Toilet Height: standard height with riser  Home Equipment: rollator - 4 wheeled walker, single point cane, alert button  Transfer Assistance: Independent without use of device  Ambulation Assistance:Independent with use of device (4WW), [facility] scooter at [facility]  ADL Assistance: independent with all ADL's  IADL Assistance: aide helps the pt with cleaning 1 hour per week, pt does his own laundry, pt goes to the grocery with a volunteer on Wednesdays, pt cooks   Active :        [] Yes                 [x] No   - transportation through [employer]  Hand Dominance: [] Left                 [x] Right  Current Employment: retired.  Occupation: teacher, marketing (lots of international travel)  Hobbies: [facility] [employer] ([employer] and other activities 2x/week)  Recent Falls: Two falls in the last six months, pt stated his legs get \"shaky\"     Comment: Sleeps on flat bed     Examination   Vision:   Vision Corrective Device: wears glasses at all times -glasses are not at the hospital  Hearing:   WFL    Perception:   WFL  Observation:   General Observation:  room air, tele monitor,  IV in LUE  Posture:   Good, head and shoulders mildly forward  Sensation:   denies numbness and tingling  Proprioception:    WFL  Tone:   Normotonic  Coordination Testing:   WFL    ROM:   (B) UE AROM WFL  Strength:   (B) UE strength  grossly WFL    Therapist Clinical Decision Making (Complexity): low complexity  Clinical Presentation: stable      Subjective  General: Patient semi-fowlers in bed upon arrival and agreeable to session. Pt noted with increased impulsivity this date.     Pain: 0/10  Pain Interventions: not applicable        Activities of Daily Living  Basic Activities of Daily Living  Grooming: contact guard assistance requires verbal cueing  Grooming Comments: Pt able to wash face and use mouthwash while in stance at sink. Pt requires cues for initiation.   Lower Extremity Dressing: setup assistance contact guard assistance    Dressing Comments: Pt able to doff current brief and thread feet int new brief while seated on toilet. Pt required CGA to pull brief past hips while in stance.   Toileting: stand by assistance.    Toileting Comments: Pt requested to toilet this date. Pt able to complete clothing management and vivian-care with SBA. Pt had small BM this date.   Instrumental Activities of Daily Living  No IADL completed on this date.    Functional Mobility  Bed Mobility:  Supine to Sit: stand by assistance  Scooting: stand by assistance  Comments: pt able to scoot up in bed after cues for initiation and positioning.     Transfers:  Sit to stand transfer:stand by assistance  Stand to sit transfer: stand by assistance  Toilet transfer: stand by assistance  Comments: Pt with impulsivity during STSs.   Functional Mobility  Functional Mobility Activity: Pt able to complete fxnl mobility to/from bathroom, and within facility halls  Device Use: rollator (4WRW)  Required Assistance: stand by assistance  Comment: No LOB, occasional reports of shaky legs, no dizziness reported. Pt completed 10 ft + 15 ft + 100 ft.   Balance:  Static Sitting Balance: good(+): independent with high level dynamic balance in unsupported position  Dynamic Sitting Balance: good: independent with functional balance in unsupported position  Static Standing Balance:

## 2024-02-01 NOTE — PROGRESS NOTES
Assessment completed. VSS. Patient awake, alert, and in bed. Medications given per MAR. No complaints of pain or discomfort at this time. Safety precautions in place. Call light within reach. Will continue to monitor.

## 2024-02-01 NOTE — PROGRESS NOTES
The pt was open to visit, sharing hopes for discharge and his Mosque beliefs. The pt unpacked the spirituality practices that offer him support, watching mass daily. The pt shared the community support he has at his independent living, and appears hopeful. No further needs, no planned visits scheduled.

## 2024-02-01 NOTE — DISCHARGE INSTRUCTIONS
Follow up with your PCP within 4-5 days of discharge.  Have PCP repeat CBC and basic metabolic profile  Take all your medications as prescribed.

## 2024-02-01 NOTE — PROGRESS NOTES
Physical Therapy  Kar Flanagan  1945    Attempted to see patient for PT session. Patient currently having a procedure at the bedside. Will follow up as schedule allows.     Ramandeep Murray PT, DPT [id number]

## 2024-02-01 NOTE — PROGRESS NOTES
Peripheral iv removed with no complications, discharge paperwork given including to f/u with pcp in 1-2 weeks, pt verbalized understanding, awaiting transport back to his apartment.

## 2024-02-01 NOTE — CARE COORDINATION
CM changed RT to self-pay  Patient aware of est. Cost $86-$96.00 for wheelchair ride home.   Patient provided CM with [employer].     CM discussed family contacts, Patient aware that contact number on file are not in service.     CM requested Late Shift CM monitor Pt's ride in RT.     Electronically signed by Alycia Germain on 2/1/2024 at 3:59 PM

## 2024-02-02 NOTE — CARE COORDINATION
Patient has yet to be picked up by a wheelchair provider.  SW [facility]  [facility] will hopefully entice providers to  the ride.  Patient is a self pay and SW put in pt's credit card information into [employer] booking system earlier today.  SW has reached out to the Case Mgmt Supervisor regarding this case and ride.  Informed RN of the situation as well.    Electronically signed by JER Villaseñor, JASIEL on 2/1/2024 at 7:58 PM

## 2024-02-04 NOTE — DISCHARGE SUMMARY
Hospital Medicine Discharge Summary    Patient ID: Kar Flanagan      Patient's PCP: Dmitriy Castillo MD    Admit Date: 1/29/2024     Discharge Date: 2/1/2024     Admitting Physician: Gordon Reis MD     Discharge Physician: DERIC MARMOLEJO MD     Hospital Course: This 78-year-old male with PMHx COPD, smoking abuse, CHF, A-fib; on Eliquis, chronic insomnia and major depressive disorder who presented after syncope and fall at home      Syncope and fall; likely 2/2  generalized weakness/orthostatic hypotension  Multiple falls in past couple of months; recently admitted on 12/9/23 and was noted to have 3 broken ribs  CT head and cervical spine negative for acute pathology.  CK WNL. UA negative.    Hs troponin -ve. EKG -ve for acute ischemic changes.  Echo showed EF of 55 to 60%.  No RWMA noted  Orthostatic vitals positive on admission ; improved with IV fluids     Generalized weakness; TSH, Vit D,B12 and folic acid WNL     Hx of COPD/asthma.  Not in acute exacerbation.  Continue home inhalers      Hx of A-fib.-Rate controlled.  Continue Eliquis.       Bipolar depression: Continue Prozac     Hx of nicotine dependence: Counseled on smoking cessation     Hx of recent rib fractures-lidocaine patch.    Physical Exam Performed:     /75   Pulse 78   Temp 98 °F (36.7 °C) (Oral)   Resp 18   Ht 1.727 m (5' 8\")   Wt 75.4 kg (166 lb 4.8 oz)   SpO2 98%   BMI 25.29 kg/m²     General appearance: No apparent distress, appears stated age and cooperative.  Eyes: Sclera clear. Pupils equal.  ENT: Moist oral mucosa. Trachea midline, no adenopathy.  Cardiovascular: Regular rhythm, normal S1, S2. No murmur.   Respiratory: Clear to auscultation bilaterally, no wheeze or crackles.   GI: Abdomen soft, no tenderness, not distended, normal bowel sounds  Musculoskeletal:  No cyanosis in digits.  No BLE edema present  Neurology: CN 2-12 grossly intact. No speech or motor deficits  Psych: Not agitated, appropriate

## 2024-07-02 ENCOUNTER — TELEPHONE (OUTPATIENT)
Dept: DERMATOLOGY | Age: 79
End: 2024-07-02

## 2024-07-02 NOTE — TELEPHONE ENCOUNTER
Growth on top of his head spreading to his forehead came in 7/1 at the wrong time and could not be seen.. please call [phone]

## 2024-07-24 ENCOUNTER — APPOINTMENT (OUTPATIENT)
Dept: CT IMAGING | Age: 79
End: 2024-07-24
Payer: MEDICARE

## 2024-07-24 ENCOUNTER — HOSPITAL ENCOUNTER (EMERGENCY)
Age: 79
Discharge: ANOTHER HOSPITAL | End: 2024-07-25
Attending: EMERGENCY MEDICINE
Payer: MEDICARE

## 2024-07-24 ENCOUNTER — APPOINTMENT (OUTPATIENT)
Dept: GENERAL RADIOLOGY | Age: 79
End: 2024-07-24
Payer: MEDICARE
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
## 2024-07-24 DIAGNOSIS — S22.41XA CLOSED FRACTURE OF MULTIPLE RIBS OF RIGHT SIDE, INITIAL ENCOUNTER: Primary | ICD-10-CM

## 2024-07-24 LAB
ALBUMIN SERPL-MCNC: 2.6 G/DL (ref 3.4–5)
ALBUMIN/GLOB SERPL: 0.7 {RATIO} (ref 1.1–2.2)
ALP SERPL-CCNC: 162 U/L (ref 40–129)
ALT SERPL-CCNC: 111 U/L (ref 10–40)
ANION GAP SERPL CALCULATED.3IONS-SCNC: 10 MMOL/L (ref 3–16)
APTT BLD: 32.3 SEC (ref 22.1–36.4)
AST SERPL-CCNC: 158 U/L (ref 15–37)
BASOPHILS # BLD: 0.1 K/UL (ref 0–0.2)
BASOPHILS NFR BLD: 0.6 %
BILIRUB SERPL-MCNC: 0.3 MG/DL (ref 0–1)
BUN SERPL-MCNC: 14 MG/DL (ref 7–20)
CALCIUM SERPL-MCNC: 7.6 MG/DL (ref 8.3–10.6)
CHLORIDE SERPL-SCNC: 102 MMOL/L (ref 99–110)
CO2 SERPL-SCNC: 23 MMOL/L (ref 21–32)
CREAT SERPL-MCNC: <0.5 MG/DL (ref 0.8–1.3)
DEPRECATED RDW RBC AUTO: 14.9 % (ref 12.4–15.4)
EOSINOPHIL # BLD: 0.1 K/UL (ref 0–0.6)
EOSINOPHIL NFR BLD: 1.2 %
GFR SERPLBLD CREATININE-BSD FMLA CKD-EPI: >90 ML/MIN/{1.73_M2}
GLUCOSE SERPL-MCNC: 120 MG/DL (ref 70–99)
HCT VFR BLD AUTO: 37.7 % (ref 40.5–52.5)
HGB BLD-MCNC: 12.8 G/DL (ref 13.5–17.5)
INR PPP: 1.03 (ref 0.85–1.15)
LYMPHOCYTES # BLD: 1.4 K/UL (ref 1–5.1)
LYMPHOCYTES NFR BLD: 13.3 %
MCH RBC QN AUTO: 31 PG (ref 26–34)
MCHC RBC AUTO-ENTMCNC: 34 G/DL (ref 31–36)
MCV RBC AUTO: 91.2 FL (ref 80–100)
MONOCYTES # BLD: 1.2 K/UL (ref 0–1.3)
MONOCYTES NFR BLD: 10.9 %
NEUTROPHILS # BLD: 8 K/UL (ref 1.7–7.7)
NEUTROPHILS NFR BLD: 74 %
PLATELET # BLD AUTO: 425 K/UL (ref 135–450)
PMV BLD AUTO: 7.4 FL (ref 5–10.5)
POTASSIUM SERPL-SCNC: 5.1 MMOL/L (ref 3.5–5.1)
PROT SERPL-MCNC: 6.4 G/DL (ref 6.4–8.2)
PROTHROMBIN TIME: 13.7 SEC (ref 11.9–14.9)
RBC # BLD AUTO: 4.13 M/UL (ref 4.2–5.9)
REASON FOR REJECTION: NORMAL
REJECTED TEST: NORMAL
SODIUM SERPL-SCNC: 135 MMOL/L (ref 136–145)
TROPONIN, HIGH SENSITIVITY: 6 NG/L (ref 0–22)
TROPONIN, HIGH SENSITIVITY: 7 NG/L (ref 0–22)
WBC # BLD AUTO: 10.8 K/UL (ref 4–11)

## 2024-07-24 PROCEDURE — 85730 THROMBOPLASTIN TIME PARTIAL: CPT

## 2024-07-24 PROCEDURE — 72125 CT NECK SPINE W/O DYE: CPT

## 2024-07-24 PROCEDURE — 6370000000 HC RX 637 (ALT 250 FOR IP): Performed by: PHYSICIAN ASSISTANT

## 2024-07-24 PROCEDURE — 6360000004 HC RX CONTRAST MEDICATION: Performed by: PHYSICIAN ASSISTANT

## 2024-07-24 PROCEDURE — 80053 COMPREHEN METABOLIC PANEL: CPT

## 2024-07-24 PROCEDURE — 71045 X-RAY EXAM CHEST 1 VIEW: CPT

## 2024-07-24 PROCEDURE — 99285 EMERGENCY DEPT VISIT HI MDM: CPT

## 2024-07-24 PROCEDURE — 96374 THER/PROPH/DIAG INJ IV PUSH: CPT

## 2024-07-24 PROCEDURE — 96376 TX/PRO/DX INJ SAME DRUG ADON: CPT

## 2024-07-24 PROCEDURE — 93005 ELECTROCARDIOGRAM TRACING: CPT | Performed by: EMERGENCY MEDICINE

## 2024-07-24 PROCEDURE — 84484 ASSAY OF TROPONIN QUANT: CPT

## 2024-07-24 PROCEDURE — 96375 TX/PRO/DX INJ NEW DRUG ADDON: CPT

## 2024-07-24 PROCEDURE — 71260 CT THORAX DX C+: CPT

## 2024-07-24 PROCEDURE — 70450 CT HEAD/BRAIN W/O DYE: CPT

## 2024-07-24 PROCEDURE — 85025 COMPLETE CBC W/AUTO DIFF WBC: CPT

## 2024-07-24 PROCEDURE — 94640 AIRWAY INHALATION TREATMENT: CPT

## 2024-07-24 PROCEDURE — 85610 PROTHROMBIN TIME: CPT

## 2024-07-24 PROCEDURE — 6360000002 HC RX W HCPCS: Performed by: PHYSICIAN ASSISTANT

## 2024-07-24 PROCEDURE — 73060 X-RAY EXAM OF HUMERUS: CPT

## 2024-07-24 RX ORDER — LIDOCAINE 4 G/G
1 PATCH TOPICAL ONCE
Status: DISCONTINUED | OUTPATIENT
Start: 2024-07-24 | End: 2024-07-25 | Stop reason: HOSPADM

## 2024-07-24 RX ORDER — FENTANYL CITRATE 50 UG/ML
25 INJECTION, SOLUTION INTRAMUSCULAR; INTRAVENOUS ONCE
Status: COMPLETED | OUTPATIENT
Start: 2024-07-24 | End: 2024-07-24

## 2024-07-24 RX ORDER — IPRATROPIUM BROMIDE AND ALBUTEROL SULFATE 2.5; .5 MG/3ML; MG/3ML
1 SOLUTION RESPIRATORY (INHALATION) ONCE
Status: COMPLETED | OUTPATIENT
Start: 2024-07-24 | End: 2024-07-24

## 2024-07-24 RX ORDER — ONDANSETRON 2 MG/ML
4 INJECTION INTRAMUSCULAR; INTRAVENOUS EVERY 6 HOURS PRN
Status: DISCONTINUED | OUTPATIENT
Start: 2024-07-24 | End: 2024-07-25 | Stop reason: HOSPADM

## 2024-07-24 RX ADMIN — ONDANSETRON 4 MG: 2 INJECTION INTRAMUSCULAR; INTRAVENOUS at 22:03

## 2024-07-24 RX ADMIN — FENTANYL CITRATE 25 MCG: 50 INJECTION INTRAMUSCULAR; INTRAVENOUS at 23:51

## 2024-07-24 RX ADMIN — IPRATROPIUM BROMIDE AND ALBUTEROL SULFATE 1 DOSE: .5; 3 SOLUTION RESPIRATORY (INHALATION) at 23:53

## 2024-07-24 RX ADMIN — FENTANYL CITRATE 25 MCG: 50 INJECTION INTRAMUSCULAR; INTRAVENOUS at 22:02

## 2024-07-24 RX ADMIN — IOPAMIDOL 75 ML: 755 INJECTION, SOLUTION INTRAVENOUS at 23:18

## 2024-07-24 ASSESSMENT — ENCOUNTER SYMPTOMS
NAUSEA: 0
RHINORRHEA: 0
ABDOMINAL PAIN: 0
SHORTNESS OF BREATH: 0
DIARRHEA: 0
COUGH: 0
VOMITING: 0

## 2024-07-24 NOTE — PATIENT INSTRUCTIONS
[facility]  [address]   [place], [place] [address]  Telephone: [phone]     FAX [phone]    Discharge Instructions    Important reminders:    **If you have any signs and symptoms of illness (Cough, fever, congestion, nausea, vomiting, diarrhea, etc.) please call the wound care center prior to your appointment.    1. Increase Protein intake for optimal wound healing  2. No added salt to reduce any swelling  3. If diabetic, maintain good glucose control  4. If you smoke, smoking prohibits wound healing, we ask that you refrain from smoking.  5. When taking antibiotics take the entire prescription as ordered. Do not stop taking until medication is all gone unless otherwise instructed.   6. Exercise as tolerated.   7. Keep weight off wounds and reposition every 2 hours if applicable.  8. If wound(s) is on your lower extremity, elevate legs to the level of the heart or above for 30 minutes 4-5 times a day and/or when sitting. Avoid standing for long periods of time.   9. Do not get wounds wet in bath or shower unless otherwise instructed by your physician. If your wound is on your foot or leg, you may purchase a cast bag. Please ask at the pharmacy.      If Vascular testing is ordered, please call [phone] to schedule.    Vascular tests ordered by Wound Care Physicians may take up to 2 hours to complete. Please keep that in mind when scheduling.     If Vascular testing is scheduled, please bring supplies to replace your dressing after testing is done. The vascular department does not stock supplies.     Wound: Groin     With each dressing change, rinse wounds with 0.9% Saline. (May use wound wash or soft contact solution. Both can be purchased at a local drug store). If unable to obtain saline, may use a gentle soap and water.    Dressing care: Apply ointment, alginate, Hydrofera blue, collagen, ABD pad, dry dressing, Ace wrap change dressing daily/ every other day/ once a week and

## 2024-07-25 ENCOUNTER — HOSPITAL ENCOUNTER (OUTPATIENT)
Dept: WOUND CARE | Age: 79
Discharge: HOME | End: 2024-07-25
Attending: EMERGENCY MEDICINE

## 2024-07-25 VITALS
DIASTOLIC BLOOD PRESSURE: 66 MMHG | OXYGEN SATURATION: 97 % | RESPIRATION RATE: 18 BRPM | WEIGHT: 161 LBS | SYSTOLIC BLOOD PRESSURE: 121 MMHG | TEMPERATURE: 98.7 F | HEART RATE: 74 BPM | BODY MASS INDEX: 24.4 KG/M2 | HEIGHT: 68 IN

## 2024-07-25 PROCEDURE — 6370000000 HC RX 637 (ALT 250 FOR IP): Performed by: PHYSICIAN ASSISTANT

## 2024-07-25 RX ORDER — ACETAMINOPHEN 500 MG
1000 TABLET ORAL ONCE
Status: COMPLETED | OUTPATIENT
Start: 2024-07-25 | End: 2024-07-25

## 2024-07-25 RX ADMIN — ACETAMINOPHEN 1000 MG: 500 TABLET ORAL at 03:09

## 2024-07-25 NOTE — ED PROVIDER NOTES
[facility] EMERGENCY DEPARTMENT  EMERGENCY DEPARTMENT ENCOUNTER        Pt Name: Kar Flanagan  MRN: [id number]  Birthdate 1945  Date of evaluation: 7/24/2024  Provider: Kate Smith PA-C  PCP: Dmitriy Castillo MD  Note Started: 10:33 PM EDT 7/24/24       I have seen and evaluated this patient with my supervising physician Isis Mcgee MD.      CHIEF COMPLAINT       Chief Complaint   Patient presents with    Fall     Pt came in from home, pt reports they were trying to get out of car and fell, pt does not remember fall but reports left sided rib pain, pt reports hitting head and reports taking blood thinner.        HISTORY OF PRESENT ILLNESS: 1 or more Elements     History From: Patient  Limitations to history : None    Kar Flanagan is a 78 y.o. male who presents to the emergency department today for evaluation for concerns of a fall.  The patient reports that he was taking his cat to the bed, he states that he was walking, and he states that he \"just fell\".  The patient denies feeling dizzy, lightheaded, having chest pain or shortness of breath before his fall.  Patient is unsure exactly why he fell.  Patient reports that he did hit his head however there is no loss of consciousness or vomiting.  Patient remembers the fall in its entirety.  Patient states that he did hit his head, he states he also hit his left ribs, and does have a skin tear to his left arm.  Patient states that he is anticoagulated on Eliquis secondary to history of atrial fibrillation.  The patient's not had any recent illnesses including fever, cough, vomiting or diarrhea.  The patient otherwise has no other complaints or injuries    Nursing Notes were all reviewed and agreed with or any disagreements were addressed in the HPI.    REVIEW OF SYSTEMS :      Review of Systems   Constitutional:  Negative for activity change, appetite change, chills and fever.   HENT:  Negative for congestion and

## 2024-07-25 NOTE — ED NOTES
Pt transferred to [facility] emergency room by [facility] transport [facility]

## 2024-07-26 LAB
EKG ATRIAL RATE: 88 BPM
EKG DIAGNOSIS: NORMAL
EKG P AXIS: 18 DEGREES
EKG P-R INTERVAL: 158 MS
EKG Q-T INTERVAL: 408 MS
EKG QRS DURATION: 130 MS
EKG QTC CALCULATION (BAZETT): 493 MS
EKG R AXIS: -61 DEGREES
EKG T AXIS: 19 DEGREES
EKG VENTRICULAR RATE: 88 BPM

## 2024-07-26 PROCEDURE — 93010 ELECTROCARDIOGRAM REPORT: CPT | Performed by: INTERNAL MEDICINE

## 2024-10-24 NOTE — PATIENT INSTRUCTIONS
[facility]  [address]   [place], [place] [address]  Telephone: [phone]     FAX [phone]    Discharge Instructions    Important reminders:    **If you have any signs and symptoms of illness (Cough, fever, congestion, nausea, vomiting, diarrhea, etc.) please call the wound care center prior to your appointment.    1. Increase Protein intake for optimal wound healing  2. No added salt to reduce any swelling  3. If diabetic, maintain good glucose control  4. If you smoke, smoking prohibits wound healing, we ask that you refrain from smoking.  5. When taking antibiotics take the entire prescription as ordered. Do not stop taking until medication is all gone unless otherwise instructed.   6. Exercise as tolerated.   7. Keep weight off wounds and reposition every 2 hours if applicable.  8. If wound(s) is on your lower extremity, elevate legs to the level of the heart or above for 30 minutes 4-5 times a day and/or when sitting. Avoid standing for long periods of time.   9. Do not get wounds wet in bath or shower unless otherwise instructed by your physician. If your wound is on your foot or leg, you may purchase a cast bag. Please ask at the pharmacy.      If Vascular testing is ordered, please call [phone] to schedule.    Vascular tests ordered by Wound Care Physicians may take up to 2 hours to complete. Please keep that in mind when scheduling.     If Vascular testing is scheduled, please bring supplies to replace your dressing after testing is done. The vascular department does not stock supplies.     Wound: Right and Left  Groin    With each dressing change, rinse wounds with 0.9% Saline. (May use wound wash or soft contact solution. Both can be purchased at a local drug store). If unable to obtain saline, may use a gentle soap and water.    Dressing care: Apply Clindamycin cream to irritated areas, ABD pads.Completely pat dry before application. In clinic use anti

## 2024-10-27 RX ORDER — BETAMETHASONE DIPROPIONATE 0.5 MG/G
CREAM TOPICAL ONCE
OUTPATIENT
Start: 2024-10-28 | End: 2024-10-28

## 2024-10-27 RX ORDER — SILVER SULFADIAZINE 10 MG/G
CREAM TOPICAL ONCE
OUTPATIENT
Start: 2024-10-28 | End: 2024-10-28

## 2024-10-27 RX ORDER — SODIUM CHLOR/HYPOCHLOROUS ACID 0.033 %
SOLUTION, IRRIGATION IRRIGATION ONCE
OUTPATIENT
Start: 2024-10-28 | End: 2024-10-28

## 2024-10-27 RX ORDER — GENTAMICIN SULFATE 1 MG/G
OINTMENT TOPICAL ONCE
OUTPATIENT
Start: 2024-10-28 | End: 2024-10-28

## 2024-10-27 RX ORDER — LIDOCAINE HYDROCHLORIDE 20 MG/ML
JELLY TOPICAL ONCE
OUTPATIENT
Start: 2024-10-28 | End: 2024-10-28

## 2024-10-27 RX ORDER — LIDOCAINE 40 MG/G
CREAM TOPICAL ONCE
OUTPATIENT
Start: 2024-10-28 | End: 2024-10-28

## 2024-10-27 RX ORDER — GINSENG 100 MG
CAPSULE ORAL ONCE
OUTPATIENT
Start: 2024-10-28 | End: 2024-10-28

## 2024-10-27 RX ORDER — MUPIROCIN 20 MG/G
OINTMENT TOPICAL ONCE
OUTPATIENT
Start: 2024-10-28 | End: 2024-10-28

## 2024-10-27 RX ORDER — NEOMYCIN/BACITRACIN/POLYMYXINB 3.5-400-5K
OINTMENT (GRAM) TOPICAL ONCE
OUTPATIENT
Start: 2024-10-28 | End: 2024-10-28

## 2024-10-27 RX ORDER — LIDOCAINE 50 MG/G
OINTMENT TOPICAL ONCE
OUTPATIENT
Start: 2024-10-28 | End: 2024-10-28

## 2024-10-27 RX ORDER — LIDOCAINE HYDROCHLORIDE 40 MG/ML
SOLUTION TOPICAL ONCE
OUTPATIENT
Start: 2024-10-28 | End: 2024-10-28

## 2024-10-27 RX ORDER — BACITRACIN ZINC AND POLYMYXIN B SULFATE 500; 1000 [USP'U]/G; [USP'U]/G
OINTMENT TOPICAL ONCE
OUTPATIENT
Start: 2024-10-28 | End: 2024-10-28

## 2024-10-27 RX ORDER — CLOBETASOL PROPIONATE 0.5 MG/G
OINTMENT TOPICAL ONCE
OUTPATIENT
Start: 2024-10-28 | End: 2024-10-28

## 2024-10-27 RX ORDER — TRIAMCINOLONE ACETONIDE 1 MG/G
OINTMENT TOPICAL ONCE
OUTPATIENT
Start: 2024-10-28 | End: 2024-10-28

## 2024-10-28 ENCOUNTER — HOSPITAL ENCOUNTER (OUTPATIENT)
Dept: WOUND CARE | Age: 79
Discharge: HOME | End: 2024-10-28
Payer: MEDICARE

## 2024-10-28 VITALS — DIASTOLIC BLOOD PRESSURE: 66 MMHG | HEART RATE: 85 BPM | SYSTOLIC BLOOD PRESSURE: 116 MMHG | RESPIRATION RATE: 15 BRPM

## 2024-10-28 DIAGNOSIS — Z72.0 TOBACCO ABUSE: ICD-10-CM

## 2024-10-28 DIAGNOSIS — E66.811 OBESITY (BMI 30.0-34.9): ICD-10-CM

## 2024-10-28 DIAGNOSIS — L73.2 HIDRADENITIS SUPPURATIVA: Primary | ICD-10-CM

## 2024-10-28 PROCEDURE — 99203 OFFICE O/P NEW LOW 30 MIN: CPT

## 2024-10-28 PROCEDURE — 99213 OFFICE O/P EST LOW 20 MIN: CPT

## 2024-10-28 RX ORDER — GENTAMICIN SULFATE 1 MG/G
OINTMENT TOPICAL ONCE
OUTPATIENT
Start: 2024-10-28 | End: 2024-10-28

## 2024-10-28 RX ORDER — GINSENG 100 MG
CAPSULE ORAL ONCE
OUTPATIENT
Start: 2024-10-28 | End: 2024-10-28

## 2024-10-28 RX ORDER — CLINDAMYCIN PHOSPHATE 10 UG/ML
LOTION TOPICAL
Qty: 60 G | Refills: 3 | Status: SHIPPED | OUTPATIENT
Start: 2024-10-28 | End: 2024-11-27

## 2024-10-28 RX ORDER — LIDOCAINE HYDROCHLORIDE 20 MG/ML
JELLY TOPICAL ONCE
OUTPATIENT
Start: 2024-10-28 | End: 2024-10-28

## 2024-10-28 RX ORDER — LIDOCAINE 50 MG/G
OINTMENT TOPICAL ONCE
OUTPATIENT
Start: 2024-10-28 | End: 2024-10-28

## 2024-10-28 RX ORDER — BETAMETHASONE DIPROPIONATE 0.5 MG/G
CREAM TOPICAL ONCE
OUTPATIENT
Start: 2024-10-28 | End: 2024-10-28

## 2024-10-28 RX ORDER — CLOBETASOL PROPIONATE 0.5 MG/G
OINTMENT TOPICAL ONCE
OUTPATIENT
Start: 2024-10-28 | End: 2024-10-28

## 2024-10-28 RX ORDER — SODIUM CHLOR/HYPOCHLOROUS ACID 0.033 %
SOLUTION, IRRIGATION IRRIGATION ONCE
OUTPATIENT
Start: 2024-10-28 | End: 2024-10-28

## 2024-10-28 RX ORDER — BACITRACIN ZINC AND POLYMYXIN B SULFATE 500; 1000 [USP'U]/G; [USP'U]/G
OINTMENT TOPICAL ONCE
OUTPATIENT
Start: 2024-10-28 | End: 2024-10-28

## 2024-10-28 RX ORDER — LIDOCAINE HYDROCHLORIDE 40 MG/ML
SOLUTION TOPICAL ONCE
OUTPATIENT
Start: 2024-10-28 | End: 2024-10-28

## 2024-10-28 RX ORDER — NEOMYCIN/BACITRACIN/POLYMYXINB 3.5-400-5K
OINTMENT (GRAM) TOPICAL ONCE
OUTPATIENT
Start: 2024-10-28 | End: 2024-10-28

## 2024-10-28 RX ORDER — SILVER SULFADIAZINE 10 MG/G
CREAM TOPICAL ONCE
OUTPATIENT
Start: 2024-10-28 | End: 2024-10-28

## 2024-10-28 RX ORDER — TRIAMCINOLONE ACETONIDE 1 MG/G
OINTMENT TOPICAL ONCE
OUTPATIENT
Start: 2024-10-28 | End: 2024-10-28

## 2024-10-28 RX ORDER — LIDOCAINE 40 MG/G
CREAM TOPICAL ONCE
OUTPATIENT
Start: 2024-10-28 | End: 2024-10-28

## 2024-10-28 RX ORDER — MUPIROCIN 20 MG/G
OINTMENT TOPICAL ONCE
OUTPATIENT
Start: 2024-10-28 | End: 2024-10-28

## 2024-10-28 NOTE — PLAN OF CARE
Discharge instructions given.  Patient verbalized understanding.  Return to [facility] in 1 week(s).  Called/faxed orders to  [facility]

## 2024-10-28 NOTE — PROGRESS NOTES
Wound Care Supplies      Supply Company:     [employer] [address] [place] [address] f: [phone] f: [phone] p: [phone] [email or web address]                                                                                                                                                                                                                                                                                                                                                                                                 Ordering Center:    [facility]  [address]  [facility] [address]  [phone]  Dept: [phone]   Fax# [phone]    Patient Demographics:     Kar Russell  [address]  [facility] [address]   [phone]   : 1945  AGE: 79 y.o.     GENDER: male   PATIENT EMAIL: [email or web address]  TODAYS DATE:  10/28/2024      Insurance Information:     PRIMARY INSURANCE:  Plan: [employer] MEDICARE ADVANTAGE HMO  Coverage: AETNA MEDICARE  Effective Date: 2022  Group Number: [unfilled]  Subscriber Number: [id number] - (Medicare Managed)    Payer/Plan Subscr  Sex Relation Sub. Ins. ID Effective Group Num   1. [employer] MEDICAR* TAMMY RUSSELL* 1945 Male Self [id number]                                   [address]         Wound Information:    Additional ICD-10 Codes:     Patient Active Problem List   Diagnosis Code    COPD (chronic obstructive pulmonary disease) ([facility]) J44.9    Major depressive disorder ([facility]) F32.9    Hypertension I10    CHF (congestive heart failure) ([facility]) I50.9    Asthma J45.909    Chronic insomnia F51.04    RBBB (right bundle branch block) I45.10    S/P gastric bypass Z98.84    Hidradenitis suppurativa L73.2    Obstructive sleep apnea G47.33    Seborrheic dermatitis L21.9    Restless leg syndrome G25.81    Fourth degree hemorrhoids K64.3    Symptomatic cholelithiasis K80.20    H/O

## 2024-10-29 NOTE — PROGRESS NOTES
[facility]     Note Type: Medical Staff Progress Note    Referring Provider: Albino Simmons RN, NP  Reason for Referral: bilateral groin wounds     Kar Flanagan  MEDICAL RECORD NUMBER:  [id number]  AGE: 79 y.o.   GENDER: male  : 1945  EPISODE DATE:  10/28/2024    Chief complaint and reason for visit:     Chief Complaint   Patient presents with    Wound Check     Initial visit - Perineal         HPI/Wound Narrative:      Kar Flanagan is a 79 y.o. male who presents today for an evaluation of a wound/ulcer. Wound duration:  Recurrent issue over the years .  Patient presents today for initial wound care visit for bilateral groin skin irritation referred by PCP.  Patient endorses a history of hydradenitis suppurativa with recurrent flares cared for by Dr. Hardy with [facility] seen outpatient every 6 months.  History of atrial fibrillation, COPD, hypertension, and tobacco use.  Patient denies pain, fever, chills, and/or nausea/vomiting.    Wound/Ulcer Pain Timing/Severity: none  Quality of pain: N/A  Severity:  0 / 10   Modifying Factors: None  Associated Signs/Symptoms: none    Medical Decision Making:     Historian(s): patient .     Ulcer Identification:  Ulcer Type:  Hidradenitis Suppurativa     Contributing Factors: poor hygiene    Acute Wound: N/A not an acute wound    Comorbid conditions affecting wound healing: As noted in PMH and PSH which was reviewed.  Pertinent labs reviewed.   Review of medical records and external note (s) from other providers was done for this visit.    Problem List Items Addressed This Visit          Other    Hidradenitis suppurativa - Primary    Obesity (BMI 30.0-34.9)    Tobacco abuse       Wounds/Problems Addressed and Treatment Plan:  #1 right groin-hidradenitis flare with rupture boils and pustules with elevation.  Drainage scant with some purulence noted.  Periwound with erythema and boils underneath skin.

## 2024-11-12 NOTE — PATIENT INSTRUCTIONS
both sides. Give patient the number to order from.  See your Dermatologist.  MARCIA. has hydradenitis clinic for future problems    Home Care Agency/Facility:     Your wound-care supplies will be provided by  Please note, depending on your insurance coverage, you may have out-of-pocket expenses for these supplies. Someone from the company should call you to confirm your order and discuss those potential costs before they ship your products -- please anticipate that call. If your out-of-pocket cost could be substantial, Many companies have financial hardship programs for patients who qualify, so please ask about that if you might need a hand. If you have any questions about your supplies or your potential out-of-pocket costs, or if you need to place an order for a refill of supplies (typically monthly), please call the company directly.     Your  is Maryjo Ray Follow up  in the wound care center.       Wound Care Center Information: Should you experience any significant changes in your wound(s) or have questions about your wound care, please contact the [facility] at [phone] Monday  - Thursday 8:00 am - 4:00 pm and Friday 8:00 am - 1:00pm. If you need help with your wound outside these hours and cannot wait until we are again available, contact your PCP or go to the hospital emergency room.     PLEASE NOTE: IF YOU ARE UNABLE TO OBTAIN WOUND SUPPLIES, CONTINUE TO USE THE SUPPLIES YOU HAVE AVAILABLE UNTIL YOU ARE ABLE TO REACH US. IT IS MOST IMPORTANT TO KEEP THE WOUND COVERED AT ALL TIMES.    Patient Experience    Thank you for trusting us with your care.  You may receive a survey from a company called [employer] asking for your feedback.  We would appreciate it if you took a few minutes to share your experience.  Your input is very valuable to us.

## 2024-11-14 ENCOUNTER — HOSPITAL ENCOUNTER (OUTPATIENT)
Dept: WOUND CARE | Age: 79
Discharge: HOME | End: 2024-11-14
Payer: MEDICARE

## 2024-11-14 VITALS
TEMPERATURE: 96.5 F | RESPIRATION RATE: 17 BRPM | HEART RATE: 74 BPM | DIASTOLIC BLOOD PRESSURE: 68 MMHG | SYSTOLIC BLOOD PRESSURE: 124 MMHG

## 2024-11-14 DIAGNOSIS — Z72.0 TOBACCO ABUSE: ICD-10-CM

## 2024-11-14 DIAGNOSIS — L73.2 HIDRADENITIS SUPPURATIVA: Primary | ICD-10-CM

## 2024-11-14 DIAGNOSIS — E66.811 OBESITY (BMI 30.0-34.9): ICD-10-CM

## 2024-11-14 PROCEDURE — 99213 OFFICE O/P EST LOW 20 MIN: CPT

## 2024-11-14 PROCEDURE — 99212 OFFICE O/P EST SF 10 MIN: CPT

## 2024-11-14 RX ORDER — MUPIROCIN 20 MG/G
OINTMENT TOPICAL ONCE
Status: CANCELLED | OUTPATIENT
Start: 2024-11-14 | End: 2024-11-14

## 2024-11-14 RX ORDER — TRIAMCINOLONE ACETONIDE 1 MG/G
OINTMENT TOPICAL ONCE
Status: CANCELLED | OUTPATIENT
Start: 2024-11-14 | End: 2024-11-14

## 2024-11-14 RX ORDER — LIDOCAINE 50 MG/G
OINTMENT TOPICAL ONCE
Status: CANCELLED | OUTPATIENT
Start: 2024-11-14 | End: 2024-11-14

## 2024-11-14 RX ORDER — LIDOCAINE HYDROCHLORIDE 20 MG/ML
JELLY TOPICAL ONCE
Status: CANCELLED | OUTPATIENT
Start: 2024-11-14 | End: 2024-11-14

## 2024-11-14 RX ORDER — GINSENG 100 MG
CAPSULE ORAL ONCE
Status: CANCELLED | OUTPATIENT
Start: 2024-11-14 | End: 2024-11-14

## 2024-11-14 RX ORDER — SILVER SULFADIAZINE 10 MG/G
CREAM TOPICAL ONCE
Status: CANCELLED | OUTPATIENT
Start: 2024-11-14 | End: 2024-11-14

## 2024-11-14 RX ORDER — CLOBETASOL PROPIONATE 0.5 MG/G
OINTMENT TOPICAL ONCE
Status: CANCELLED | OUTPATIENT
Start: 2024-11-14 | End: 2024-11-14

## 2024-11-14 RX ORDER — BETAMETHASONE DIPROPIONATE 0.5 MG/G
CREAM TOPICAL ONCE
Status: CANCELLED | OUTPATIENT
Start: 2024-11-14 | End: 2024-11-14

## 2024-11-14 RX ORDER — LIDOCAINE HYDROCHLORIDE 40 MG/ML
SOLUTION TOPICAL ONCE
Status: CANCELLED | OUTPATIENT
Start: 2024-11-14 | End: 2024-11-14

## 2024-11-14 RX ORDER — NEOMYCIN/BACITRACIN/POLYMYXINB 3.5-400-5K
OINTMENT (GRAM) TOPICAL ONCE
Status: CANCELLED | OUTPATIENT
Start: 2024-11-14 | End: 2024-11-14

## 2024-11-14 RX ORDER — LIDOCAINE 40 MG/G
CREAM TOPICAL ONCE
Status: DISCONTINUED | OUTPATIENT
Start: 2024-11-14 | End: 2024-11-15 | Stop reason: HOSPADM

## 2024-11-14 RX ORDER — GENTAMICIN SULFATE 1 MG/G
OINTMENT TOPICAL ONCE
Status: CANCELLED | OUTPATIENT
Start: 2024-11-14 | End: 2024-11-14

## 2024-11-14 RX ORDER — BACITRACIN ZINC AND POLYMYXIN B SULFATE 500; 1000 [USP'U]/G; [USP'U]/G
OINTMENT TOPICAL ONCE
Status: CANCELLED | OUTPATIENT
Start: 2024-11-14 | End: 2024-11-14

## 2024-11-14 RX ORDER — SODIUM CHLOR/HYPOCHLOROUS ACID 0.033 %
SOLUTION, IRRIGATION IRRIGATION ONCE
Status: CANCELLED | OUTPATIENT
Start: 2024-11-14 | End: 2024-11-14

## 2024-11-14 RX ORDER — LIDOCAINE 40 MG/G
CREAM TOPICAL ONCE
Status: CANCELLED | OUTPATIENT
Start: 2024-11-14 | End: 2024-11-14

## 2024-11-14 NOTE — PROGRESS NOTES
until we are again available, contact your PCP or go to the hospital emergency room.     PLEASE NOTE: IF YOU ARE UNABLE TO OBTAIN WOUND SUPPLIES, CONTINUE TO USE THE SUPPLIES YOU HAVE AVAILABLE UNTIL YOU ARE ABLE TO REACH US. IT IS MOST IMPORTANT TO KEEP THE WOUND COVERED AT ALL TIMES.    Patient Experience    Thank you for trusting us with your care.  You may receive a survey from a company called [employer] asking for your feedback.  We would appreciate it if you took a few minutes to share your experience.  Your input is very valuable to us.          Electronically signed by CHRISTOPHER Ferreira CNP on 11/14/2024 at 2:14 PM

## 2024-11-14 NOTE — PLAN OF CARE
Discharge instructions given.  Patient verbalized understanding.  No Return to [facility]   Healed

## (undated) DEVICE — GOWN AURORA NONREINF LG: Brand: MEDLINE INDUSTRIES, INC.

## (undated) DEVICE — BW-412T DISP COMBO CLEANING BRUSH: Brand: SINGLE USE COMBINATION CLEANING BRUSH

## (undated) DEVICE — Device: Brand: DISPOSABLE ELECTROSURGICAL SNARE

## (undated) DEVICE — TRAP SPEC RETRV CLR PLAS POLYP IN LN SUCT QUIK CTCH

## (undated) DEVICE — SET VLV 3 PC AWS DISPOSABLE GRDIAN SCOPEVALET

## (undated) DEVICE — PROCEDURE KIT ENDOSCP CUST

## (undated) DEVICE — SOLUTION IV IRRIG WATER 500ML POUR BRL ST 2F7113

## (undated) DEVICE — 60 ML SYRINGE,REGULAR TIP: Brand: MONOJECT

## (undated) DEVICE — FORCEPS BX L240CM WRK CHN 2.8MM STD CAP W/ NDL MIC MESH

## (undated) DEVICE — CLIP INT L235CM WRK CHN DIA2.8MM OPN 11MM BRAID CATH ROT BX/10

## (undated) DEVICE — MOUTHPIECE ENDOSCP L CTRL OPN AND SIDE PORTS DISP

## (undated) DEVICE — SYRINGE MED 50ML LUERLOCK TIP

## (undated) DEVICE — ELECTRODE PT RET AD L9FT HI MOIST COND ADH HYDRGEL CORDED